# Patient Record
Sex: FEMALE | Race: WHITE | NOT HISPANIC OR LATINO | Employment: FULL TIME | URBAN - METROPOLITAN AREA
[De-identification: names, ages, dates, MRNs, and addresses within clinical notes are randomized per-mention and may not be internally consistent; named-entity substitution may affect disease eponyms.]

---

## 2017-03-02 RX ORDER — LEVOTHYROXINE SODIUM 88 UG/1
TABLET ORAL
Qty: 30 TAB | Refills: 0 | Status: SHIPPED | OUTPATIENT
Start: 2017-03-02 | End: 2017-04-04 | Stop reason: SDUPTHER

## 2017-03-03 DIAGNOSIS — E89.0 HYPOTHYROIDISM, POSTSURGICAL: ICD-10-CM

## 2017-03-21 ENCOUNTER — HOSPITAL ENCOUNTER (OUTPATIENT)
Dept: LAB | Facility: MEDICAL CENTER | Age: 37
End: 2017-03-21
Attending: INTERNAL MEDICINE
Payer: COMMERCIAL

## 2017-03-21 DIAGNOSIS — E89.0 HYPOTHYROIDISM, POSTSURGICAL: ICD-10-CM

## 2017-03-21 LAB
T4 FREE SERPL-MCNC: 0.9 NG/DL (ref 0.53–1.43)
TSH SERPL DL<=0.005 MIU/L-ACNC: 1.84 UIU/ML (ref 0.3–3.7)

## 2017-03-21 PROCEDURE — 84439 ASSAY OF FREE THYROXINE: CPT

## 2017-03-21 PROCEDURE — 84443 ASSAY THYROID STIM HORMONE: CPT

## 2017-03-21 PROCEDURE — 36415 COLL VENOUS BLD VENIPUNCTURE: CPT

## 2017-03-27 ENCOUNTER — TELEPHONE (OUTPATIENT)
Dept: ENDOCRINOLOGY | Facility: MEDICAL CENTER | Age: 37
End: 2017-03-27

## 2017-03-27 NOTE — TELEPHONE ENCOUNTER
Called Pt and notified    The thyroid levels look good. Is there a new PCP who will be managing the thyroid prescription ?    Pt don't have PCP, he still looking for one.    Thank you  Dian

## 2017-04-05 RX ORDER — LEVOTHYROXINE SODIUM 88 UG/1
TABLET ORAL
Qty: 30 TAB | Refills: 0 | Status: SHIPPED | OUTPATIENT
Start: 2017-04-05 | End: 2017-05-08 | Stop reason: SDUPTHER

## 2017-05-08 RX ORDER — LEVOTHYROXINE SODIUM 88 UG/1
88 TABLET ORAL
Qty: 60 TAB | Refills: 0 | Status: SHIPPED | OUTPATIENT
Start: 2017-05-08 | End: 2017-07-10 | Stop reason: SDUPTHER

## 2017-07-10 RX ORDER — LEVOTHYROXINE SODIUM 88 UG/1
88 TABLET ORAL
Qty: 60 TAB | Refills: 0 | Status: SHIPPED | OUTPATIENT
Start: 2017-07-10 | End: 2017-12-01

## 2017-07-10 NOTE — TELEPHONE ENCOUNTER
Pt was told she needed to be seen before getting a refill. She made appt for mid July, can we give her a Rx to hold her over until then? Thank you

## 2017-07-19 ENCOUNTER — HOSPITAL ENCOUNTER (OUTPATIENT)
Dept: LAB | Facility: MEDICAL CENTER | Age: 37
End: 2017-07-19
Attending: OBSTETRICS & GYNECOLOGY
Payer: COMMERCIAL

## 2017-07-19 LAB
DHEA-S SERPL-MCNC: 252.3 UG/DL (ref 60.9–337)
ESTRADIOL SERPL-MCNC: 37 PG/ML
FSH SERPL-ACNC: 9 MIU/ML
LH SERPL-ACNC: 7 IU/L

## 2017-07-19 PROCEDURE — 36415 COLL VENOUS BLD VENIPUNCTURE: CPT

## 2017-07-19 PROCEDURE — 84270 ASSAY OF SEX HORMONE GLOBUL: CPT

## 2017-07-19 PROCEDURE — 82627 DEHYDROEPIANDROSTERONE: CPT

## 2017-07-19 PROCEDURE — 82670 ASSAY OF TOTAL ESTRADIOL: CPT

## 2017-07-19 PROCEDURE — 84403 ASSAY OF TOTAL TESTOSTERONE: CPT

## 2017-07-19 PROCEDURE — 83002 ASSAY OF GONADOTROPIN (LH): CPT

## 2017-07-19 PROCEDURE — 83001 ASSAY OF GONADOTROPIN (FSH): CPT

## 2017-07-22 LAB
SHBG SERPL-SCNC: 55 NMOL/L (ref 30–135)
TESTOST FREE SERPL-MCNC: 1.9 PG/ML (ref 1.3–9.2)
TESTOST SERPL-MCNC: 16 NG/DL (ref 9–55)

## 2017-07-25 ENCOUNTER — OFFICE VISIT (OUTPATIENT)
Dept: MEDICAL GROUP | Facility: MEDICAL CENTER | Age: 37
End: 2017-07-25
Payer: COMMERCIAL

## 2017-07-25 VITALS
SYSTOLIC BLOOD PRESSURE: 104 MMHG | HEIGHT: 68 IN | BODY MASS INDEX: 20.31 KG/M2 | OXYGEN SATURATION: 99 % | WEIGHT: 134 LBS | DIASTOLIC BLOOD PRESSURE: 76 MMHG | TEMPERATURE: 98.6 F | HEART RATE: 70 BPM

## 2017-07-25 DIAGNOSIS — E89.0 HYPOTHYROIDISM, POSTSURGICAL: ICD-10-CM

## 2017-07-25 DIAGNOSIS — M54.41 ACUTE BILATERAL LOW BACK PAIN WITH RIGHT-SIDED SCIATICA: ICD-10-CM

## 2017-07-25 DIAGNOSIS — E53.8 B12 DEFICIENCY: ICD-10-CM

## 2017-07-25 DIAGNOSIS — N30.10 INTERSTITIAL CYSTITIS: ICD-10-CM

## 2017-07-25 DIAGNOSIS — E56.9 VITAMIN DEFICIENCY: ICD-10-CM

## 2017-07-25 DIAGNOSIS — Z31.9 PATIENT DESIRES PREGNANCY: ICD-10-CM

## 2017-07-25 PROBLEM — M54.40 ACUTE BILATERAL LOW BACK PAIN WITH SCIATICA: Status: ACTIVE | Noted: 2017-07-25

## 2017-07-25 PROCEDURE — 99203 OFFICE O/P NEW LOW 30 MIN: CPT | Performed by: NURSE PRACTITIONER

## 2017-07-25 ASSESSMENT — PATIENT HEALTH QUESTIONNAIRE - PHQ9: CLINICAL INTERPRETATION OF PHQ2 SCORE: 0

## 2017-07-25 NOTE — ASSESSMENT & PLAN NOTE
Low back strain during pilates class about 1 month ago  Now with persistent Bilateral low back with radiation down the posterior right leg, worse when sitting for long periods of time. She's had to modify her exercise routine Using advil on occ with some benefit  No lower extremity numbness, tingling, weakness, no saddle anesthesia

## 2017-07-25 NOTE — ASSESSMENT & PLAN NOTE
Working on achieving pregnancy  Having irregular menses last few months  Recent hormones normal  Has had difficulty with prenatal flaring interstitial cystitis, so now on a few separate vitamins including folic acid

## 2017-07-25 NOTE — PROGRESS NOTES
Chief Complaint   Patient presents with   • Establish Care   • Back Pain     x4 wks      Lisa Arevalo is a 36 y.o. female here to establish care, she had previously seen Liliana CANTU. She works for the [place] [employer], is  and lives in [place]. We discussed:  Hypothyroidism, postsurgical  2010  Hyperthyroid  Now managed with levoxyll 88 mcg  Energy and weight stable  Patient desires pregnancy  Working on achieving pregnancy  Having irregular menses last few months  Recent hormones normal  Has had difficulty with prenatal flaring interstitial cystitis, so now on a few separate vitamins including folic acid  Interstitial Cystitis  Previously seen by urology  Uses uribel on occ  B12 deficiency  On supplementation  Vegetarian diet  No numbness, tingling  Acute bilateral low back pain with sciatica  Low back strain during pilates class about 1 month ago  Bilateral low back with radiation down the right leg  Using advil on occ with some benefit  Vitamin d deficiency  On vitamin D supplementation, last value 41    Current medicines (including changes today)  Current Outpatient Prescriptions   Medication Sig Dispense Refill   • Meth-Hyo-M Bl-Na Phos-Ph Sal (URIBEL PO) Take  by mouth.     • LEVOXYL 88 MCG Tab Take 1 Tab by mouth Every morning on an empty stomach. 60 Tab 0   • clindamycin (CLEOCIN) 1 % Solution Apply  to affected area(s) as needed.     • tretinoin (RETIN-A) 0.1 % cream Apply  to affected area(s) every evening.       No current facility-administered medications for this visit.     She  has a past medical history of Migraine; Interstitial cystitis; Hyperthyroidism (2010); Arrhythmia; Heart burn; Cold; Indigestion; Renal disorder; Hypothyroidism, postsurgical (2010); and Fibromyalgia.  She  has past surgical history that includes mastoidectomy; myringotomy; tonsillectomy and adenoidectomy (1985); other orthopedic surgery; other; and thyroidectomy total (9/29/2010).  Social History  "  Substance Use Topics   • Smoking status: Never Smoker    • Smokeless tobacco: Never Used   • Alcohol Use: Yes      Comment: rare     Social History     Social History Narrative     Family History   Problem Relation Age of Onset   • Hypertension Father    • Hypertension Brother    • Alcohol/Drug Brother    • Cancer Maternal Grandmother      breast, colon     Family Status   Relation Status Death Age   • Father Alive    • Brother     • Mother Alive          ROS  Problems listed discussed above, all other systems reviewed and negative     Objective:     Blood pressure 104/76, pulse 70, temperature 37 °C (98.6 °F), height 1.727 m (5' 8\"), weight 60.782 kg (134 lb), SpO2 99 %. Body mass index is 20.38 kg/(m^2).  Physical Exam:  General: Alert, oriented in no acute distress.  Eye contact is good, speech is normal, affect calm  HEENT:  perrl, Oral mucosa pink moist, no lesions. Nares patent. TMs gray with good landmarks bilaterally. No cervical or supraclavicular lymphadenopathy  Lungs: clear to auscultation bilaterally, good aeration, normal effort. No wheeze/ rhonchi/ rales.  CV: regular rate and rhythm, S1, S2. No murmur, no edema. Pedal pulses 2 + bilaterally  Abdomen: soft, nontender, BS x4  Ext: color normal, vascularity normal, temperature normal. No rash or lesions.  MS: no point tenderness over spine, no obvious deformity. Negative straight leg raise bilaterally. Normal gait, normal flexion at the waist, Strength is 5/5 in bilateral LE   Assessment and Plan:   The following treatment plan was discussed   1. Hypothyroidism, postsurgical  Clinically euthyroid, recheck labs  COMP METABOLIC PANEL    TSH    FREE THYROXINE   2. Patient desires pregnancy  Working on conceiving, followed by ob/gyn   3. Interstitial cystitis  occ using uribel with good results   4. B12 deficiency  Has been stable on supplementation  CBC WITH DIFFERENTIAL    VIT B12,  FOLIC ACID   5. Acute bilateral low back pain with right-sided " sciatica  Conservative treatment reviewed, printed exercises given. May continue with advil as needed, ice/ heat application. If not improving in 1-2 weeks we'll plan for referral to physical therapy and possibly imaging, she will let me know    6. Vitamin deficiency   on supplementation  VITAMIN D,25 HYDROXY       Educated in proper administration of medication(s) ordered today including safety, possible SE, risks, benefits, rationale and alternatives to therapy.     Followup: pending labs             Please note that this dictation was created using voice recognition software. I have worked with consultants from the vendor as well as technical experts from [employer] to optimize the interface. I have made every reasonable attempt to correct obvious errors, but I expect that there are errors of grammar and possibly content that I did not discover before finalizing the note.

## 2017-07-25 NOTE — MR AVS SNAPSHOT
"        Lisa Arevalo   2017 2:40 PM   Office Visit   MRN: [id number]    Department:  [facility]   Dept Phone:  [phone]    Description:  Female : 1980   Provider:  MASOOD Moody           Reason for Visit     Establish Care     Back Pain x4 wks       Allergies as of 2017     Allergen Noted Reactions    Macrobid [Nitrofurantoin Monohydrate Macrocrystals] 2009       Chest problems    Minocycline 2009   Hives    Neurontin [Gabapentin] 2009       Slurry speechj    Rocephin [Ceftriaxone Sodium] 2009   Hives    Tapazole [Thiamazole] 2009   Hives    Propylthiouracil 2010         You were diagnosed with     Hypothyroidism, postsurgical   [544110]       Patient desires pregnancy   [832815]       Interstitial cystitis   [839891]       B12 deficiency   [966173]       Acute bilateral low back pain with right-sided sciatica   [0607181]       Vitamin deficiency   [531383]         Vital Signs     Blood Pressure Pulse Temperature Height Weight Body Mass Index    104/76 mmHg 70 37 °C (98.6 °F) 1.727 m (5' 8\") 60.782 kg (134 lb) 20.38 kg/m2    Oxygen Saturation Smoking Status                99% Never Smoker           Basic Information     Date Of Birth Sex Race Ethnicity Preferred Language    1980 Female White Non- English      Problem List              ICD-10-CM Priority Class Noted - Resolved    Migraine    Unknown - Present    Interstitial cystitis N30.10   Unknown - Present    B12 deficiency E53.8   2011 - Present    S/P total thyroidectomy for Graves Disease,  E89.0   2012 - Present    Vitamin d deficiency    2013 - Present    Fibromyalgia M79.7   Unknown - Present    Hypothyroidism, postsurgical E89.0   2016 - Present    Patient desires pregnancy Z31.9   2017 - Present    Acute bilateral low back pain with sciatica M54.40   2017 - Present      Health Maintenance        Date Due Completion Dates   " IMM DTaP/Tdap/Td Vaccine (1 - Tdap) 9/20/2001 ---    PAP SMEAR 9/20/2003 ---    IMM INFLUENZA (1) 9/1/2017 ---            Current Immunizations     HPV Quadrivalent Vaccine (GARDASIL) 1/1/2008, 1/1/2008, 1/1/2008    Hepatitis B Vaccine Non-Recombivax (Ped/Adol) 1/1/1997    Tetanus Vaccine 1/1/2005      Below and/or attached are the medications your provider expects you to take. Review all of your home medications and newly ordered medications with your provider and/or pharmacist. Follow medication instructions as directed by your provider and/or pharmacist. Please keep your medication list with you and share with your provider. Update the information when medications are discontinued, doses are changed, or new medications (including over-the-counter products) are added; and carry medication information at all times in the event of emergency situations     Allergies:  MACROBID - (reactions not documented)     MINOCYCLINE - Hives     NEURONTIN - (reactions not documented)     ROCEPHIN - Hives     TAPAZOLE - Hives     PROPYLTHIOURACIL - (reactions not documented)               Medications  Valid as of: July 25, 2017 -  3:31 PM    Generic Name Brand Name Tablet Size Instructions for use    Clindamycin Phosphate (Solution) CLEOCIN 1 % Apply  to affected area(s) as needed.        Levothyroxine Sodium (Tab) LEVOXYL 88 MCG Take 1 Tab by mouth Every morning on an empty stomach.        Meth-Hyo-M Bl-Na Phos-Ph Sal   Take  by mouth.        Tretinoin (Cream) RETIN-A 0.1 % Apply  to affected area(s) every evening.        .                 Medicines prescribed today were sent to:     [id number] - [place] - [address]    [email or web address] [place] [place] [address]    Phone: [phone] Fax: [phone]    Open 24 Hours?: No      Medication refill instructions:       If your prescription bottle indicates you have medication refills left, it is not necessary to call your provider’s office. Please contact your pharmacy and they  will refill your medication.    If your prescription bottle indicates you do not have any refills left, you may request refills at any time through one of the following ways: The online [employer] system (except Urgent Care), by calling your provider’s office, or by asking your pharmacy to contact your provider’s office with a refill request. Medication refills are processed only during regular business hours and may not be available until the next business day. Your provider may request additional information or to have a follow-up visit with you prior to refilling your medication.   *Please Note: Medication refills are assigned a new Rx number when refilled electronically. Your pharmacy may indicate that no refills were authorized even though a new prescription for the same medication is available at the pharmacy. Please request the medicine by name with the pharmacy before contacting your provider for a refill.        Your To Do List     Future Labs/Procedures Complete By Expires    CBC WITH DIFFERENTIAL  As directed 7/26/2018    COMP METABOLIC PANEL  As directed 7/26/2018    FREE THYROXINE  As directed 7/26/2018    TSH  As directed 7/26/2018    VITAMIN D,25 HYDROXY  As directed 7/26/2018         [employer] Access Code: Activation code not generated  Current [employer] Status: Active

## 2017-08-19 ENCOUNTER — HOSPITAL ENCOUNTER (OUTPATIENT)
Dept: LAB | Facility: MEDICAL CENTER | Age: 37
End: 2017-08-19
Attending: NURSE PRACTITIONER
Payer: COMMERCIAL

## 2017-08-19 DIAGNOSIS — E56.9 VITAMIN DEFICIENCY: ICD-10-CM

## 2017-08-19 DIAGNOSIS — E89.0 HYPOTHYROIDISM, POSTSURGICAL: ICD-10-CM

## 2017-08-19 DIAGNOSIS — E53.8 B12 DEFICIENCY: ICD-10-CM

## 2017-08-19 LAB
25(OH)D3 SERPL-MCNC: 28 NG/ML (ref 30–100)
ALBUMIN SERPL BCP-MCNC: 4.3 G/DL (ref 3.2–4.9)
ALBUMIN/GLOB SERPL: 1.7 G/DL
ALP SERPL-CCNC: 42 U/L (ref 30–99)
ALT SERPL-CCNC: 12 U/L (ref 2–50)
ANION GAP SERPL CALC-SCNC: 4 MMOL/L (ref 0–11.9)
AST SERPL-CCNC: 15 U/L (ref 12–45)
BASOPHILS # BLD AUTO: 1.2 % (ref 0–1.8)
BASOPHILS # BLD: 0.05 K/UL (ref 0–0.12)
BILIRUB SERPL-MCNC: 3 MG/DL (ref 0.1–1.5)
BUN SERPL-MCNC: 11 MG/DL (ref 8–22)
CALCIUM SERPL-MCNC: 9.6 MG/DL (ref 8.5–10.5)
CHLORIDE SERPL-SCNC: 109 MMOL/L (ref 96–112)
CO2 SERPL-SCNC: 26 MMOL/L (ref 20–33)
CREAT SERPL-MCNC: 0.83 MG/DL (ref 0.5–1.4)
EOSINOPHIL # BLD AUTO: 0.13 K/UL (ref 0–0.51)
EOSINOPHIL NFR BLD: 3.1 % (ref 0–6.9)
ERYTHROCYTE [DISTWIDTH] IN BLOOD BY AUTOMATED COUNT: 40.5 FL (ref 35.9–50)
FOLATE SERPL-MCNC: >23.7 NG/ML
GFR SERPL CREATININE-BSD FRML MDRD: >60 ML/MIN/1.73 M 2
GLOBULIN SER CALC-MCNC: 2.5 G/DL (ref 1.9–3.5)
GLUCOSE SERPL-MCNC: 81 MG/DL (ref 65–99)
HCT VFR BLD AUTO: 47.7 % (ref 37–47)
HGB BLD-MCNC: 16.2 G/DL (ref 12–16)
IMM GRANULOCYTES # BLD AUTO: 0.01 K/UL (ref 0–0.11)
IMM GRANULOCYTES NFR BLD AUTO: 0.2 % (ref 0–0.9)
LYMPHOCYTES # BLD AUTO: 1.87 K/UL (ref 1–4.8)
LYMPHOCYTES NFR BLD: 44.7 % (ref 22–41)
MCH RBC QN AUTO: 32.5 PG (ref 27–33)
MCHC RBC AUTO-ENTMCNC: 34 G/DL (ref 33.6–35)
MCV RBC AUTO: 95.8 FL (ref 81.4–97.8)
MONOCYTES # BLD AUTO: 0.26 K/UL (ref 0–0.85)
MONOCYTES NFR BLD AUTO: 6.2 % (ref 0–13.4)
NEUTROPHILS # BLD AUTO: 1.86 K/UL (ref 2–7.15)
NEUTROPHILS NFR BLD: 44.6 % (ref 44–72)
NRBC # BLD AUTO: 0 K/UL
NRBC BLD AUTO-RTO: 0 /100 WBC
PLATELET # BLD AUTO: 187 K/UL (ref 164–446)
PMV BLD AUTO: 11.2 FL (ref 9–12.9)
POTASSIUM SERPL-SCNC: 4.3 MMOL/L (ref 3.6–5.5)
PROT SERPL-MCNC: 6.8 G/DL (ref 6–8.2)
RBC # BLD AUTO: 4.98 M/UL (ref 4.2–5.4)
SODIUM SERPL-SCNC: 139 MMOL/L (ref 135–145)
T4 FREE SERPL-MCNC: 1.05 NG/DL (ref 0.53–1.43)
TSH SERPL DL<=0.005 MIU/L-ACNC: 6.01 UIU/ML (ref 0.3–3.7)
VIT B12 SERPL-MCNC: 631 PG/ML (ref 211–911)
WBC # BLD AUTO: 4.2 K/UL (ref 4.8–10.8)

## 2017-08-19 PROCEDURE — 82607 VITAMIN B-12: CPT

## 2017-08-19 PROCEDURE — 84443 ASSAY THYROID STIM HORMONE: CPT

## 2017-08-19 PROCEDURE — 82306 VITAMIN D 25 HYDROXY: CPT

## 2017-08-19 PROCEDURE — 85025 COMPLETE CBC W/AUTO DIFF WBC: CPT

## 2017-08-19 PROCEDURE — 80053 COMPREHEN METABOLIC PANEL: CPT

## 2017-08-19 PROCEDURE — 36415 COLL VENOUS BLD VENIPUNCTURE: CPT

## 2017-08-19 PROCEDURE — 82746 ASSAY OF FOLIC ACID SERUM: CPT

## 2017-08-19 PROCEDURE — 84439 ASSAY OF FREE THYROXINE: CPT

## 2017-08-21 DIAGNOSIS — E89.0 HYPOTHYROIDISM, POSTSURGICAL: ICD-10-CM

## 2017-08-22 ENCOUNTER — TELEPHONE (OUTPATIENT)
Dept: MEDICAL GROUP | Facility: MEDICAL CENTER | Age: 37
End: 2017-08-22

## 2017-08-22 RX ORDER — LEVOTHYROXINE SODIUM 100 UG/1
100 TABLET ORAL
Qty: 30 TAB | Refills: 3 | Status: SHIPPED | OUTPATIENT
Start: 2017-08-22 | End: 2017-12-26 | Stop reason: SDUPTHER

## 2017-08-22 NOTE — TELEPHONE ENCOUNTER
1. Caller Name: Pt                      Call Back Number: [phone]    2. Message: Pt called stating she is having symptoms of fatigue. She thinks this is due to the thyroid being off and would like to adjust dosage now. Please advise.     3. Patient approves office to leave a detailed voicemail/MyChart message: yes

## 2017-09-25 ENCOUNTER — TELEPHONE (OUTPATIENT)
Dept: MEDICAL GROUP | Facility: MEDICAL CENTER | Age: 37
End: 2017-09-25

## 2017-09-25 DIAGNOSIS — G89.29 CHRONIC LOW BACK PAIN, UNSPECIFIED BACK PAIN LATERALITY, WITH SCIATICA PRESENCE UNSPECIFIED: ICD-10-CM

## 2017-09-25 DIAGNOSIS — M54.5 CHRONIC LOW BACK PAIN, UNSPECIFIED BACK PAIN LATERALITY, WITH SCIATICA PRESENCE UNSPECIFIED: ICD-10-CM

## 2017-09-25 NOTE — TELEPHONE ENCOUNTER
1. Caller Name: Lisa Arevalo                        Call Back Number: [phone] (home)       2. Message: patient called would like to know if a referral to phyisical therapy can be done. She had asked sabrina to place referral however I dont see referral placed for patient.    3. Patient approves office to leave a detailed voicemail/MyChart message: yes

## 2017-10-03 ENCOUNTER — HOSPITAL ENCOUNTER (OUTPATIENT)
Dept: PHYSICAL THERAPY | Facility: REHABILITATION | Age: 37
End: 2017-10-03
Attending: FAMILY MEDICINE
Payer: COMMERCIAL

## 2017-10-03 PROCEDURE — 97012 MECHANICAL TRACTION THERAPY: CPT

## 2017-10-03 PROCEDURE — 97162 PT EVAL MOD COMPLEX 30 MIN: CPT

## 2017-10-05 ENCOUNTER — HOSPITAL ENCOUNTER (OUTPATIENT)
Dept: PHYSICAL THERAPY | Facility: REHABILITATION | Age: 37
End: 2017-10-05
Attending: FAMILY MEDICINE
Payer: COMMERCIAL

## 2017-10-05 PROCEDURE — 97110 THERAPEUTIC EXERCISES: CPT

## 2017-10-05 PROCEDURE — 97012 MECHANICAL TRACTION THERAPY: CPT

## 2017-10-05 PROCEDURE — 97140 MANUAL THERAPY 1/> REGIONS: CPT

## 2017-10-10 ENCOUNTER — APPOINTMENT (OUTPATIENT)
Dept: OTHER | Facility: IMAGING CENTER | Age: 37
End: 2017-10-10

## 2017-10-10 ENCOUNTER — APPOINTMENT (OUTPATIENT)
Dept: PHYSICAL THERAPY | Facility: REHABILITATION | Age: 37
End: 2017-10-10
Attending: FAMILY MEDICINE
Payer: COMMERCIAL

## 2017-10-10 PROCEDURE — 97530 THERAPEUTIC ACTIVITIES: CPT | Performed by: FAMILY MEDICINE

## 2017-10-12 ENCOUNTER — PHYSICAL THERAPY (OUTPATIENT)
Dept: PHYSICAL THERAPY | Facility: REHABILITATION | Age: 37
End: 2017-10-12
Attending: FAMILY MEDICINE
Payer: COMMERCIAL

## 2017-10-12 DIAGNOSIS — M54.50 BILATERAL LOW BACK PAIN WITHOUT SCIATICA, UNSPECIFIED CHRONICITY: ICD-10-CM

## 2017-10-12 PROCEDURE — 97014 ELECTRIC STIMULATION THERAPY: CPT

## 2017-10-12 PROCEDURE — 97140 MANUAL THERAPY 1/> REGIONS: CPT

## 2017-10-12 PROCEDURE — 97110 THERAPEUTIC EXERCISES: CPT

## 2017-10-12 ASSESSMENT — ENCOUNTER SYMPTOMS
EXACERBATED BY: BENDING
EXACERBATED BY: PROLONGED SITTING
EXACERBATED BY: HOUSEWORK
ALLEVIATING FACTORS: WALKING

## 2017-10-12 NOTE — OP THERAPY DAILY TREATMENT
Outpatient Physical Therapy  DAILY TREATMENT     [facility] Outpatient Physical Therapy [place] [address], [address]  [place] [address]    Date: 10/12/2017    Patient: Lisa Arevalo  YOB: 1980  MRN: [id number]     Time Calculation  Start time: 0930  Stop time: 1015 Time Calculation (min): 45 minutes     Chief Complaint: Back Injury and Back Problem    Visit #: 1    Subjective:   History of Present Illness:     Mechanism of injury:  Patient re-injured lumbar spine leaning over to pick grapes in garden this weekend  Pain:     Relieving factors:  Walking    Aggravating factors:  Prolonged sitting, housework and bending      Objective      Therapeutic Exercises:     1. Repeated extension prone over pillow , 4 x 10, painfree rom only    2. Quadraped rocking with neutral spine, 1 x 15    3. Superman on ball , 3 x 1 minute    4. Ball wall squats, 1 x 15, painfree only    Treatments:    1. Manual therapy, 10 min, lumbar CPA L4-S1, overpressure with active repeated extension    2. Electrical stimulation (interferential current), 15 minutes, IFC with MHP Lumbosacral multifidi/PS, prone in extension      Assessment, Response and Plan:   Impairments: difficulty performing job    Assessment details:  Pain with bending, lumbar spine flexion    Plan:   Therapy options:  Physical therapy treatment to continue  Frequency:  1x week  Plan details:  Extension biased Lumbar/abdominal stabilization, repeated extension

## 2017-10-13 ENCOUNTER — TELEPHONE (OUTPATIENT)
Dept: MEDICAL GROUP | Facility: MEDICAL CENTER | Age: 37
End: 2017-10-13

## 2017-10-13 NOTE — TELEPHONE ENCOUNTER
1. Caller Name: Lisa Arevalo                                         Call Back Number: [phone]      Patient approves a detailed voicemail message: yes    Pt called and reports she has been doing PT which was helping. However Pt had an apt yesterday and is now experiencing pins and needles sensations down her back and legs as well as the feeling of having an ice pack on her back without actually having one on her. Pt is very concerned and would like to know if she should have a MRI to check on things or what you suggest her to do. Pt did call and leave a message with PT as well.

## 2017-10-13 NOTE — TELEPHONE ENCOUNTER
Please inform patient:  Red flags that would require ER visit and MRI include weakness in lower extremities, numbness in the genitals, loss of bowel or bladder function  She can take Advil, this may spontaneously resolve. If it becomes persistent we will arrange for outpatient MRI. Urgent care or ER advised for any further concerns

## 2017-10-16 ENCOUNTER — TELEPHONE (OUTPATIENT)
Dept: PHYSICAL THERAPY | Facility: REHABILITATION | Age: 37
End: 2017-10-16

## 2017-10-16 NOTE — TELEPHONE ENCOUNTER
Returned phone call to patient regarding flare up in lumbar symptoms.  Symptoms appear to be peripheralizing down right LE.  Patient instructed to perform extension exercises and use moist heat/ cold pack to decrease symptoms.

## 2017-10-18 ENCOUNTER — TELEPHONE (OUTPATIENT)
Dept: MEDICAL GROUP | Facility: MEDICAL CENTER | Age: 37
End: 2017-10-18

## 2017-10-18 NOTE — TELEPHONE ENCOUNTER
1. Caller Name: Pt                      Call Back Number: [phone]    2. Message: Pt called to report that she is still getting tingles down her back down to her legs. She is starting to get pain on her left lower back and left leg. She also has had the flu since Sunday and a fever. She has a sharp stabbing pain on the left side of her head.     3. Patient approves office to leave a detailed voicemail/MyChart message: yes

## 2017-10-20 ENCOUNTER — PHYSICAL THERAPY (OUTPATIENT)
Dept: PHYSICAL THERAPY | Facility: REHABILITATION | Age: 37
End: 2017-10-20
Attending: FAMILY MEDICINE
Payer: COMMERCIAL

## 2017-10-20 ENCOUNTER — OFFICE VISIT (OUTPATIENT)
Dept: MEDICAL GROUP | Facility: MEDICAL CENTER | Age: 37
End: 2017-10-20
Payer: COMMERCIAL

## 2017-10-20 VITALS
SYSTOLIC BLOOD PRESSURE: 104 MMHG | HEIGHT: 68 IN | TEMPERATURE: 98.9 F | BODY MASS INDEX: 20.16 KG/M2 | OXYGEN SATURATION: 98 % | HEART RATE: 77 BPM | WEIGHT: 133 LBS | DIASTOLIC BLOOD PRESSURE: 60 MMHG

## 2017-10-20 DIAGNOSIS — M54.41 ACUTE BILATERAL LOW BACK PAIN WITH RIGHT-SIDED SCIATICA: ICD-10-CM

## 2017-10-20 DIAGNOSIS — M54.16 LUMBAR BACK PAIN WITH RADICULOPATHY AFFECTING LEFT LOWER EXTREMITY: ICD-10-CM

## 2017-10-20 DIAGNOSIS — M54.42 ACUTE BILATERAL LOW BACK PAIN WITH BILATERAL SCIATICA: ICD-10-CM

## 2017-10-20 DIAGNOSIS — M54.41 ACUTE BILATERAL LOW BACK PAIN WITH BILATERAL SCIATICA: ICD-10-CM

## 2017-10-20 PROCEDURE — 97014 ELECTRIC STIMULATION THERAPY: CPT

## 2017-10-20 PROCEDURE — 97110 THERAPEUTIC EXERCISES: CPT

## 2017-10-20 PROCEDURE — 99214 OFFICE O/P EST MOD 30 MIN: CPT | Performed by: NURSE PRACTITIONER

## 2017-10-20 PROCEDURE — 97140 MANUAL THERAPY 1/> REGIONS: CPT

## 2017-10-20 PROCEDURE — 97530 THERAPEUTIC ACTIVITIES: CPT

## 2017-10-20 RX ORDER — METHYLPREDNISOLONE 4 MG/1
TABLET ORAL
Qty: 21 TAB | Refills: 0 | Status: SHIPPED | OUTPATIENT
Start: 2017-10-20 | End: 2017-12-01

## 2017-10-20 NOTE — PROGRESS NOTES
"Subjective:     Chief Complaint   Patient presents with   • Follow-Up     Back issues and tingling sensation      Lisa Arevalo is a 37 y.o. female here today to follow up on:    Acute bilateral low back pain with sciatica  Ongoing difficulty with low back pain since July of this year, now worsening  She does not recall any particular injury but felt that she had strained initially and Pilates  She recently went to physical therapy and her pain got significantly worse. Now a 6-7 out of 10 with radiation down the left leg. She has persistent numbness and tingling across the low back and down the leg as well. She is having difficulty bending over, the discomfort is waking her up during the night. She's been taking 400 mg of ibuprofen with no improvement  No change in bowel or bladder function, no saddle anesthesia       Current medicines (including changes today)  Current Outpatient Prescriptions   Medication Sig Dispense Refill   • MethylPREDNISolone (MEDROL DOSEPAK) 4 MG Tablet Therapy Pack As directed on the packaging label. 21 Tab 0   • LEVOXYL 100 MCG Tab Take 1 Tab by mouth Every morning on an empty stomach. 30 Tab 3   • Meth-Hyo-M Bl-Na Phos-Ph Sal (URIBEL PO) Take  by mouth.     • LEVOXYL 88 MCG Tab Take 1 Tab by mouth Every morning on an empty stomach. 60 Tab 0   • clindamycin (CLEOCIN) 1 % Solution Apply  to affected area(s) as needed.     • tretinoin (RETIN-A) 0.1 % cream Apply  to affected area(s) every evening.       No current facility-administered medications for this visit.      She  has a past medical history of Arrhythmia; Cold; Fibromyalgia; Heart burn; Hyperthyroidism (2010); Hypothyroidism, postsurgical (2010); Indigestion; Interstitial cystitis; Migraine; and Renal disorder.    ROS included above     Objective:     Blood pressure 104/60, pulse 77, temperature 37.2 °C (98.9 °F), height 1.727 m (5' 8\"), weight 60.3 kg (133 lb), SpO2 98 %. Body mass index is 20.22 kg/m².     Physical " Exam:  General: Alert, oriented in no acute distress.  Eye contact is good, speech is normal, affect calm  Lungs: clear to auscultation bilaterally, normal effort, no wheeze/ rhonchi/ rales.  CV: regular rate and rhythm, S1, S2, no murmur  MS: Mildly tender over the lumbar spine without obvious abnormality, difficulty bending at the waist. Negative straight leg raise bilaterally. DTRs are 2+ in bilateral lower extremities. Antalgic gait  Ext: no edema, color normal, vascularity normal, temperature normal    Assessment and Plan:   The following treatment plan was discussed   1. Lumbar back pain with radiculopathy affecting left lower extremity  Persistent numbness and tingling across the low back and in the left leg. We'll start her on short course of oral corticosteroid, imaging as listed below. Advised to take methylprednisolone with food, do not combine with ibuprofen or other NSAIDs. She's taken this in the past and tolerated it without difficulty. Red flags reviewed including loss of bladder or bowel control, saddle anesthesia, weakness in lower extremities.   MR-LUMBAR SPINE-W/O    MethylPREDNISolone (MEDROL DOSEPAK) 4 MG Tablet Therapy Pack            Followup: One week         Please note that this dictation was created using voice recognition software. I have worked with consultants from the vendor as well as technical experts from [facility] [employer] to optimize the interface. I have made every reasonable attempt to correct obvious errors, but I expect that there are errors of grammar and possibly content that I did not discover before finalizing the note.

## 2017-10-20 NOTE — OP THERAPY DAILY TREATMENT
Outpatient Physical Therapy  DAILY TREATMENT     [facility] Outpatient Physical Therapy [place]  [address], [address]  [place] [address]    Date: 10/20/2017    Patient: Lisa Arevalo  YOB: 1980  MRN: [id number]     Time Calculation  Start time: 0902  Stop time: 0958 Time Calculation (min): 56 minutes     Chief Complaint: Back Pain (with L radicular sxs)    Visit #: 2    Subjective; Patient reports after last treatment increased with cold sensation and L tingling into L leg. Pain 7/10 this am, started feeling better, but shadi driving to appt in the car sxs increased.     Objective      Treatments:    1. Therex, Patient in supine with knee bent and ball on wall, knee curls x 15 and marching x 15 , sxs into L foot with marching , Prone with one pillow improved sxs, decreased tingling     2. Therex, 1/2 foam roller pec stretch and alternating UE 5 minutes,  10 minutes    3. Manual therapy, Thoracic PA grade III/IV tenderness T10-T12 with lateral/rotational component , Muscle energy for ASIS symmetry , 15 minutes    4. Functional training, Discussed standing posture with decreasing bilateral knee hyperextension, squatting in wide stance and office posture set up in chair  , 14 minutes    5. Electrical stimulation (interferential current), Lumbar Spine with moist heat pack in prone 15 minutes      Assessment, Response and Plan:   Assessment details:  Patient tolerated tx well, with sxs decreasing post therapy session. Patient continue to demonstrate poor postural mechanics with decreased trunk stabilization and hypomobile T-spine. Extension exercises were eased up this session to pain free range and see If change with additional symptoms. Patient will continue to benefit from skilled PT to decrease sxs and initiate exercise again.

## 2017-10-20 NOTE — ASSESSMENT & PLAN NOTE
Ongoing difficulty with low back pain since July of this year, now worsening  She does not recall any particular injury but felt that she had strained initially and Pilates  She recently went to physical therapy and her pain got significantly worse. Now a 6-7 out of 10 with radiation down the left leg. She has persistent numbness and tingling across the low back and down the leg as well. She is having difficulty bending over, the discomfort is waking her up during the night. She's been taking 400 mg of ibuprofen with no improvement  No change in bowel or bladder function, no saddle anesthesia

## 2017-10-21 ENCOUNTER — HOSPITAL ENCOUNTER (OUTPATIENT)
Dept: RADIOLOGY | Facility: MEDICAL CENTER | Age: 37
End: 2017-10-21
Attending: NURSE PRACTITIONER
Payer: COMMERCIAL

## 2017-10-21 DIAGNOSIS — M54.16 LUMBAR BACK PAIN WITH RADICULOPATHY AFFECTING LEFT LOWER EXTREMITY: ICD-10-CM

## 2017-10-21 PROCEDURE — 72148 MRI LUMBAR SPINE W/O DYE: CPT

## 2017-10-23 ENCOUNTER — TELEPHONE (OUTPATIENT)
Dept: MEDICAL GROUP | Facility: MEDICAL CENTER | Age: 37
End: 2017-10-23

## 2017-10-23 ENCOUNTER — PHYSICAL THERAPY (OUTPATIENT)
Dept: PHYSICAL THERAPY | Facility: REHABILITATION | Age: 37
End: 2017-10-23
Attending: FAMILY MEDICINE
Payer: COMMERCIAL

## 2017-10-23 DIAGNOSIS — D18.09 HEMANGIOMA OF SPINE: ICD-10-CM

## 2017-10-23 DIAGNOSIS — M54.40 ACUTE BILATERAL LOW BACK PAIN WITH SCIATICA, SCIATICA LATERALITY UNSPECIFIED: ICD-10-CM

## 2017-10-23 PROCEDURE — 97110 THERAPEUTIC EXERCISES: CPT

## 2017-10-23 PROCEDURE — 97140 MANUAL THERAPY 1/> REGIONS: CPT

## 2017-10-23 ASSESSMENT — ENCOUNTER SYMPTOMS
QUALITY: NUMBNESS
PAIN TIMING: INTERMITTENT
PAIN SCALE: 5
ALLEVIATING FACTORS: ACTIVITY MODIFICATION
ALLEVIATING FACTORS: LYING DOWN
PAIN SCALE AT HIGHEST: 8
EXACERBATED BY: KNEELING
EXACERBATED BY: PROLONGED SITTING
QUALITY: NEEDLE-LIKE
PAIN TIMING: EVERY DAY
EXACERBATED BY: BENDING
PAIN SCALE AT LOWEST: 0

## 2017-10-23 NOTE — OP THERAPY DAILY TREATMENT
Outpatient Physical Therapy  DAILY TREATMENT     [facility] Outpatient Physical Therapy [place] [id number], [address]  [place] [address]    Date: 10/23/2017    Patient: Lisa Arevalo  YOB: 1980  MRN: [id number]     Time Calculation  Start time: 1100  Stop time: 1135 Time Calculation (min): 35 minutes     Chief Complaint: Spinal Injury    Visit #: Visit count could not be calculated. Make sure you are using a visit which is associated with an episode.    Subjective:   History of Present Illness:     Mechanism of injury:  Increased bilateral LE paraesthesia including intermittent saddle anesthesia and paresthesia since last visit.  MRI shows left sacral hemangioma and minimal disc bulge L4/5.     Pain:     Current pain ratin    At best pain ratin    At worst pain ratin    Location:  Bilateral N/T lumbar to lateral hip down to bottoms of feet, sacrl    Quality:  Numbness and needle-like    Pain timing:  Every day and intermittent    Relieving factors:  Lying down and activity modification    Aggravating factors:  Bending, kneeling and prolonged sitting    Pain Comments::  Symptoms increase with hot/cold over sacrum which causes numbness from sacrum to BLE and plantar surface of feet.  Increased symptoms  pressure with palpation over sacral base .        Objective      Therapeutic Exercises:     1. Superman on ball , 3 x 30 sec    2. Ta stabilization with cuff biofeedback  , BKFO, ball curls, marching 1 x 30 each    3. Wall ball squats, 1 x 30    4. Bird dog , 1 x 15 each direction    5. Ther ex : 20 min    Treatments:    1. Manual therapy, 10 min, gentle GR 2-3 PA lumbar , sacral rocking and sacral float, flexion rotation mob  left      Assessment, Response and Plan:   Impairments: activity intolerance and pain with function    Assessment details:  Increased symptom irritability and new symptoms of intermittent saddle anesthesia are concerning.  Symptoms appear to be non mechanical and  possibly from recently dx sacral hemangioma  Recommend referral to spine specialist.    Plan:   Therapy options:  Physical therapy treatment to continue  Plan details:  Next follow-up in 2 weeks.  Re assess need to continue PT after patient sees spine specialist.

## 2017-10-23 NOTE — TELEPHONE ENCOUNTER
1. Caller Name: Pt                      Call Back Number: [phone] (home)       2. Message: Pt called requesting results of MRI.     3. Patient approves office to leave a detailed voicemail/MyChart message: N\A

## 2017-11-09 ENCOUNTER — APPOINTMENT (OUTPATIENT)
Dept: PHYSICAL THERAPY | Facility: REHABILITATION | Age: 37
End: 2017-11-09
Attending: FAMILY MEDICINE
Payer: COMMERCIAL

## 2017-11-13 ENCOUNTER — HOSPITAL ENCOUNTER (OUTPATIENT)
Dept: RADIOLOGY | Facility: MEDICAL CENTER | Age: 37
End: 2017-11-13
Attending: NURSE PRACTITIONER
Payer: COMMERCIAL

## 2017-11-13 DIAGNOSIS — M54.16 LUMBAR RADICULOPATHY: ICD-10-CM

## 2017-11-13 PROCEDURE — 72197 MRI PELVIS W/O & W/DYE: CPT

## 2017-11-13 PROCEDURE — 700117 HCHG RX CONTRAST REV CODE 255: Performed by: NURSE PRACTITIONER

## 2017-11-13 PROCEDURE — A9579 GAD-BASE MR CONTRAST NOS,1ML: HCPCS | Performed by: NURSE PRACTITIONER

## 2017-11-13 PROCEDURE — 72158 MRI LUMBAR SPINE W/O & W/DYE: CPT

## 2017-11-13 RX ADMIN — GADODIAMIDE 13 ML: 287 INJECTION INTRAVENOUS at 09:21

## 2017-11-21 ENCOUNTER — HOSPITAL ENCOUNTER (OUTPATIENT)
Dept: RADIOLOGY | Facility: MEDICAL CENTER | Age: 37
End: 2017-11-21
Attending: NURSE PRACTITIONER
Payer: COMMERCIAL

## 2017-11-21 DIAGNOSIS — R29.2 ABNORMAL REFLEX: ICD-10-CM

## 2017-11-21 PROCEDURE — 72146 MRI CHEST SPINE W/O DYE: CPT

## 2017-11-21 PROCEDURE — 72141 MRI NECK SPINE W/O DYE: CPT

## 2017-12-01 ENCOUNTER — OFFICE VISIT (OUTPATIENT)
Dept: NEUROLOGY | Facility: MEDICAL CENTER | Age: 37
End: 2017-12-01
Payer: COMMERCIAL

## 2017-12-01 VITALS
TEMPERATURE: 98.1 F | WEIGHT: 133.5 LBS | OXYGEN SATURATION: 99 % | RESPIRATION RATE: 16 BRPM | DIASTOLIC BLOOD PRESSURE: 60 MMHG | HEART RATE: 67 BPM | HEIGHT: 68 IN | SYSTOLIC BLOOD PRESSURE: 102 MMHG | BODY MASS INDEX: 20.23 KG/M2

## 2017-12-01 DIAGNOSIS — R20.2 PARESTHESIAS: Primary | ICD-10-CM

## 2017-12-01 PROCEDURE — 99244 OFF/OP CNSLTJ NEW/EST MOD 40: CPT | Performed by: PSYCHIATRY & NEUROLOGY

## 2017-12-01 ASSESSMENT — ENCOUNTER SYMPTOMS
MEMORY LOSS: 0
TINGLING: 1
DEPRESSION: 0
MYALGIAS: 1
SENSORY CHANGE: 1
NECK PAIN: 0
TREMORS: 0
HEADACHES: 0
BACK PAIN: 1
CONSTIPATION: 0
FOCAL WEAKNESS: 1
FALLS: 0

## 2017-12-01 NOTE — PROGRESS NOTES
Subjective:      Lisa Arevalo is a 37 y.o. female who presents for consultation from the office of Dr. Conklin, with a history of persistent and progressive bilateral lower extremity painful dysesthesias and paresthesias, more recently now involving the upper extremities.     HPI    Ms. Arevalo is a pleasant 37-year-old right-handed  female whose symptoms started in July of this year after working out intensely. She developed low back pain across the lumbar region which would radiate into the buttocks only. Activity seemed to make things worse, there was no weakness or distal radiation of symptoms. She took some ibuprofen which did seem to help the symptoms, things did not really seem to progress far.    As part of her treatment, she was undergoing physical therapy and in mid October with an aggressive session, there was sudden nerve pain that again began in the lumbar spine which then radiated further into the buttock and posteriorly down the back of both legs. Over time distal radiation spread even into the feet. The symptoms became constant, exacerbated with movements, at other times randomly. She now finds that sitting for long periods, even bending over can elicit these symptoms. There is no change with Valsalva, bowel and bladder functions have been stable. She describes burning dysesthesias and paresthesias when the symptoms are present, more recently she has begun to notice the symptoms spreading both perianal and kayleigh-genitally. She denies squeezing sensations around the lower abdominal cavity. As the day wears on all of this continues to get worse consistently. Her quality of life and activity levels have needless to say gone through the floor.    Over the last couple of weeks she is also begun to notice similar symptoms into both upper extremities starting in the neck. Again there is this hypersensitivity and burning sensations in a very nonspecific pattern bilaterally. She is also begun to  notice persistent skin discomfort even to nonpainful stimulus throughout.    She initially saw Dr. Conklin when the symptoms had been lumbar only, imaging of the lumbar spine was unremarkable except for minimal degenerative changes, and thus she was imaged from the neck down, again everything unremarkable. She was given a Medrol Dosepak which provided only limited benefit. Ibuprofen again has been inconsistent with benefit. She has not been on any other treatments. She has stopped her physical therapy for concerns that he will only activate the nerve pains that she is having. She now presents.    Her medical history is remarkable for migraine as a teenager, fibromyalgia also was diagnosed in the past though the symptoms resolved, hyperthyroidism as a child, status post thyroidectomy in 2010. She is also being evaluated for possible celiac disease. There is no diagnosis established for seizures, CVA, MS, other autoimmune disease, diabetes, hypertension, CAD, liver or kidney disease, blood dyscrasia, psychiatric disease, asthma, IBD, neurodegenerative disease or PVD. There is no surgical history of note other than her thyroidectomy. No one in the family that she is aware of has ever suffer from migraine or similar symptoms, her brother passed away years ago. Her father evidently has a pituitary adenoma. She has no children. 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 She is on Levoxyl.    Review of Systems   Constitutional: Positive for malaise/fatigue.   Gastrointestinal: Negative for constipation.   Genitourinary: Negative for frequency.   Musculoskeletal: Positive for back pain and myalgias. Negative for falls, joint pain and neck pain.   Neurological: Positive for tingling, sensory change and focal weakness. Negative for tremors and headaches.   Psychiatric/Behavioral: Negative for depression and memory loss.        Objective:     /60   Pulse 67   Temp  "36.7 °C (98.1 °F)   Resp 16   Ht 1.727 m (5' 8\")   Wt 60.6 kg (133 lb 8 oz)   SpO2 99%   BMI 20.30 kg/m²      Physical Exam    She appears in no acute distress. Her vital signs are stable. There is no malar rash, jaw or temporal tenderness, jaw claudication, or allodynia. The neck is supple, there is no spasm, spinous processes are nontender, range of motion is full. Carotid pulses are present without asymmetry. Cardiac evaluation is unremarkable. Trigger points are not identified throughout. There is no significant muscle tenderness to palpation, there is no evidence of diffuse joint swelling or tenderness. There is no rash.    Cognition is intact, there is no focal cognitive or language deficit. PERRLA/EOMI, visual fields are full, funduscopic exam is benign, facial movements remain symmetric, there is no sensory loss across the midline to temperature, light touch or pinprick, the tongue and uvular midline, jaw jerk is absent, shoulder shrug and head rotation are intact. Tone is normal throughout, there is no tremor, asterixis, or drift. Strength is intact with good effort bilaterally, reflexes are present at all points without asymmetries, none dropped, both toes are downgoing. She walks with normal station, heel, toe, and tandem walking are intact. Repetitive movements with the hands, fingers and feet are also intact and symmetric. There is no appendicular dystaxia. Sensory exam is intact to vibration, temperature, pinprick and JPS. Romberg is absent.     Assessment/Plan:     1. Paresthesias  Most structural pathologies have been ruled out, but she does suffer from a history of thyroiditis, I wonder if in fact we are dealing with an autoimmune Hashimoto's disease. She is also being worked up for celiac disease, and thus we might be dealing with a more diffuse autoimmune process. She has a history of migraine headache, review of records indicates she has a long list of sensitivity to medications, so I wonder " if in fact we are dealing also with a primary pain syndrome such as fibromyalgia. Obviously the latter diagnosis can be made only if everything else has been ruled out.    We do know is that this is not MS, any type of acute or chronic demyelinating polyneuropathy, but a primary axonal sensory neuropathy still needs to be checked. Autoimmune diseases are known to cause this type of peripheral process. Neurophysiologic studies are indicated to help classify the process if it can be detected, and antibody titers to rule out autoimmune thyroid disease. Depending on these results, CSF studies might also be necessary.    The nature of all of the above was reviewed in full. We also talked about fibromyalgia, and even as a primary pain condition, it can explain all of the symptoms that she is now dealing with. We talked about symptomatic relief, but for now she would like to hold until a clearer picture as to cause is found. I will follow-up with her after EMG/NCV studies are done.    - ANTITHYROGLOBULIN AB; Future  - THYROID PEROXIDASE  (TPO) AB; Future  - THYROID ANTIBODIES  - REFERRAL TO NEURODIAGNOSTICS (EEG,EP,EMG/NCS/DBS) Modality Requested: EMG/NCS-Comment Extremities  - TSH+FREE T4    Time: Evaluation of 60 minutes for exam, review, discussion, and education  Discussion: As mentioned in the assessment, over 50% of the time spent face-to-face counseling and correlating care. She cannot really communicate

## 2017-12-05 ENCOUNTER — HOSPITAL ENCOUNTER (OUTPATIENT)
Dept: LAB | Facility: MEDICAL CENTER | Age: 37
End: 2017-12-05
Attending: PSYCHIATRY & NEUROLOGY
Payer: COMMERCIAL

## 2017-12-05 DIAGNOSIS — R20.2 PARESTHESIAS: ICD-10-CM

## 2017-12-05 LAB
CRP SERPL HS-MCNC: <0.2 MG/L (ref 0–7.5)
ERYTHROCYTE [SEDIMENTATION RATE] IN BLOOD BY WESTERGREN METHOD: 3 MM/HOUR (ref 0–20)
T4 FREE SERPL-MCNC: 1.09 NG/DL (ref 0.53–1.43)
THYROPEROXIDASE AB SERPL-ACNC: 0.8 IU/ML (ref 0–9)
TSH SERPL DL<=0.005 MIU/L-ACNC: 1.25 UIU/ML (ref 0.3–3.7)

## 2017-12-05 PROCEDURE — 86376 MICROSOMAL ANTIBODY EACH: CPT

## 2017-12-05 PROCEDURE — 86800 THYROGLOBULIN ANTIBODY: CPT

## 2017-12-05 PROCEDURE — 84443 ASSAY THYROID STIM HORMONE: CPT

## 2017-12-05 PROCEDURE — 86038 ANTINUCLEAR ANTIBODIES: CPT

## 2017-12-05 PROCEDURE — 85652 RBC SED RATE AUTOMATED: CPT

## 2017-12-05 PROCEDURE — 36415 COLL VENOUS BLD VENIPUNCTURE: CPT

## 2017-12-05 PROCEDURE — 84439 ASSAY OF FREE THYROXINE: CPT

## 2017-12-05 PROCEDURE — 86141 C-REACTIVE PROTEIN HS: CPT

## 2017-12-07 LAB
NUCLEAR IGG SER QL IA: NORMAL
THYROGLOB AB SERPL-ACNC: <0.9 IU/ML (ref 0–4)

## 2017-12-11 ENCOUNTER — TELEPHONE (OUTPATIENT)
Dept: NEUROLOGY | Facility: MEDICAL CENTER | Age: 37
End: 2017-12-11

## 2017-12-11 NOTE — TELEPHONE ENCOUNTER
----- Message from Anuja Knight, Med Ass't sent at 12/8/2017  3:24 PM PST -----  Regarding: results  Patient would like the results of her lab tests.    Thank you,  Anuja

## 2017-12-12 NOTE — TELEPHONE ENCOUNTER
Called and advised the patient that Dr. Howe was happy with all the results of her blood work everything is within normal limits

## 2017-12-13 ENCOUNTER — TELEPHONE (OUTPATIENT)
Dept: NEUROLOGY | Facility: MEDICAL CENTER | Age: 37
End: 2017-12-13

## 2017-12-13 NOTE — TELEPHONE ENCOUNTER
Patient called for Dr. Howe and is wondering if he can order blood work pertaining to Celiac disease? She states she has not had gluten in her diet for the past 5 weeks and says she has heard it can cause neuropathy which is one of the reasons she sees Dr. Howe. Please advise.

## 2017-12-14 ENCOUNTER — TELEPHONE (OUTPATIENT)
Dept: PHYSICAL THERAPY | Facility: REHABILITATION | Age: 37
End: 2017-12-14

## 2017-12-14 NOTE — OP THERAPY DISCHARGE SUMMARY
Outpatient Physical Therapy  DISCHARGE SUMMARY NOTE      [facility] [address]  [address]  [place] [address]  Phone:  [phone]  Fax:  [phone]    Date of Visit: 12/14/2017    Patient: Lisa Arevalo  YOB: 1980  MRN: [id number]     Referring Provider: Dylon Stevenson MD   Referring Diagnosis Lumbar radiculopathy     Physical Therapy Occurrence Codes    Date of onset of impairment:  7/3/17   Date physical therapy care plan established or reviewed:  10/3/17   Date physical therapy treatment started:  10/3/17          Functional Limitation G-Codes and Severity Modifiers     Goal:     Discharge:         Your patient is being discharged from Physical Therapy with the following comments:   · Patient symptoms progressing and worse with bilateral LE paresthesia and dysesthesia  · Physical therapy and activity increases symptoms    Comments:      Limitations Remaining:Please see progress notes      Recommendations:D/C   Refer back to MD for additional evaluation/assessment/treatment    Jo Ann Braxton PT, MSPT    Date: 12/14/2017

## 2017-12-14 NOTE — TELEPHONE ENCOUNTER
Tell the patient I don't treat celiac disease, this is something she needs to speak about with her gastroenterologist or her primary care physician.   done

## 2017-12-15 ENCOUNTER — OFFICE VISIT (OUTPATIENT)
Dept: MEDICAL GROUP | Facility: MEDICAL CENTER | Age: 37
End: 2017-12-15
Payer: COMMERCIAL

## 2017-12-15 ENCOUNTER — HOSPITAL ENCOUNTER (OUTPATIENT)
Dept: LAB | Facility: MEDICAL CENTER | Age: 37
End: 2017-12-15
Attending: NURSE PRACTITIONER
Payer: COMMERCIAL

## 2017-12-15 VITALS
HEART RATE: 68 BPM | HEIGHT: 68 IN | SYSTOLIC BLOOD PRESSURE: 108 MMHG | WEIGHT: 132.4 LBS | DIASTOLIC BLOOD PRESSURE: 60 MMHG | OXYGEN SATURATION: 99 % | BODY MASS INDEX: 20.07 KG/M2 | TEMPERATURE: 98.4 F

## 2017-12-15 DIAGNOSIS — R52 PAIN OF MULTIPLE SITES: ICD-10-CM

## 2017-12-15 DIAGNOSIS — E89.0 HYPOTHYROIDISM, POSTSURGICAL: ICD-10-CM

## 2017-12-15 DIAGNOSIS — K90.41 GLUTEN INTOLERANCE: ICD-10-CM

## 2017-12-15 DIAGNOSIS — E53.8 B12 DEFICIENCY: ICD-10-CM

## 2017-12-15 DIAGNOSIS — F41.9 ANXIETY: ICD-10-CM

## 2017-12-15 PROBLEM — M25.50 MULTIPLE JOINT PAIN: Status: ACTIVE | Noted: 2017-12-15

## 2017-12-15 PROCEDURE — 83516 IMMUNOASSAY NONANTIBODY: CPT

## 2017-12-15 PROCEDURE — 99214 OFFICE O/P EST MOD 30 MIN: CPT | Performed by: NURSE PRACTITIONER

## 2017-12-15 PROCEDURE — 82784 ASSAY IGA/IGD/IGG/IGM EACH: CPT

## 2017-12-15 PROCEDURE — 36415 COLL VENOUS BLD VENIPUNCTURE: CPT

## 2017-12-16 PROBLEM — R52 PAIN OF MULTIPLE SITES: Status: ACTIVE | Noted: 2017-12-16

## 2017-12-16 PROBLEM — K90.41 GLUTEN INTOLERANCE: Status: ACTIVE | Noted: 2017-12-16

## 2017-12-16 PROBLEM — F41.9 ANXIETY: Status: ACTIVE | Noted: 2017-12-16

## 2017-12-16 NOTE — ASSESSMENT & PLAN NOTE
Difficulty with upset stomach, diarrhea last several years when eating gluten  Was evaluated at one point by GI, had colonoscopy which was normal but she had been off gluten for quite a while prior  A few weeks ago started incorporating gluten back into her diet and again noticed difficulty with bloating, abd discomfort and diarrhea  Has another appointment with GI scheduled  Now back on gluten free diet  No blood or mucus in stool, nausea, vomiting

## 2017-12-16 NOTE — ASSESSMENT & PLAN NOTE
Long standing problem, worse with increase in pain the last few weeks. Waking at night with panic attacks at times  Exercising and meditation helps  Has gone to counseling  Strongly prefers to avoid medication  Denies depression, si/hi, appetite changes

## 2017-12-16 NOTE — PROGRESS NOTES
"Subjective:     Chief Complaint   Patient presents with   • Referral Needed     rheumatology   • Celiac Disease     Blood Test    • Thyroid Problem     levoxil to synthroid change     Lisa Arevalo is a 37 y.o. female here today to follow up on:  Hypothyroidism, postsurgical  Recent labs reviewed, tsh and t4 in good range with levoxyl  B12 deficiency  On supplement  Vegetarian diet  Recent lab in normal range  Pain of multiple sites  Diagnosed at one point with fibromyalgia  Recently having increased and different pain than usual for her  Seen in Oct with acute back pain, was seen by Rubin and had MRIs L4-L5 disc bulg  Was having sensation change in back, genitals, pain radiating down legs, tingling/ restless sensation in arms and legs at times  Acute flare resolved but continues having multiple joint pain and \"nerve pain everywhere\", achy muscles, painful joints in hands  Has noticed eating gluten worsens symptoms  Symptoms contribute to anxiety  Was given trial of gabapentin with SE- weakness  Recent MRIs reviewed with no acute concerns. MRI of the brain in 2013 normal  Anxiety  Long standing problem, worse with increase in pain the last few weeks. Waking at night with panic attacks at times  Exercising and meditation helps  Has gone to counseling  Strongly prefers to avoid medication  Denies depression, si/hi, appetite changes  Gluten intolerance  Difficulty with upset stomach, diarrhea last several years when eating gluten  Was evaluated at one point by GI, had colonoscopy which was normal but she had been off gluten for quite a while prior  A few weeks ago started incorporating gluten back into her diet and again noticed difficulty with bloating, abd discomfort and diarrhea  Has another appointment with GI scheduled  Now back on gluten free diet  No blood or mucus in stool, nausea, vomiting     Current medicines (including changes today)  Current Outpatient Prescriptions   Medication Sig Dispense Refill " "  • LEVOXYL 100 MCG Tab Take 1 Tab by mouth Every morning on an empty stomach. 30 Tab 3     No current facility-administered medications for this visit.      She  has a past medical history of Arrhythmia; Cold; Fibromyalgia; Heart burn; Hyperthyroidism (2010); Hypothyroidism, postsurgical (2010); Indigestion; Interstitial cystitis; Migraine; and Renal disorder.    ROS included above     Objective:     Blood pressure 108/60, pulse 68, temperature 36.9 °C (98.4 °F), height 1.727 m (5' 8\"), weight 60.1 kg (132 lb 6.4 oz), last menstrual period 11/27/2017, SpO2 99 %, not currently breastfeeding. Body mass index is 20.13 kg/m².     Physical Exam:  General: Alert, oriented in no acute distress.  Eye contact is good, speech is normal, affect calm  Lungs: clear to auscultation bilaterally, normal effort, no wheeze/ rhonchi/ rales.  CV: regular rate and rhythm, S1, S2, no murmur  Abdomen: soft, nontender, nondistended, no hepatosplenomegaly  MS: no joint swelling or redness, strength is 5/5 throughout, no point tenderness over the spine, shoulder joints, hips, knees  Ext: no edema, color normal, vascularity normal, temperature normal    Assessment and Plan:   The following treatment plan was discussed   1. Pain of multiple sites  Wide spread pain, restless and tingling sensation in arms and legs worse in the last few weeks. Autoimmune work up negative, multiple MRIs without significant abnormality. Discussed prior diagnosis of fibromyalgia, anxiety as contributing factor.   2. Hypothyroidism, postsurgical  Stable, continue levoxyl   3. Gluten intolerance  Bloating, abd discomfort and diarrhea with gluten. Has upcoming appt with GI  CELIAC DISEASE AB PANEL   4. B12 deficiency  Stable, continue supplement   5. Anxiety  Long standing issue, likely contributing to pain complaints. Benefits of SSRIs discussed. Pt prefers to avoid medication at this time and will notify me if she is interested. Enc to continue with regular " exercise, meditation, counseling.       Followup: pending labs         Please note that this dictation was created using voice recognition software. I have worked with consultants from the vendor as well as technical experts from [facility] to optimize the interface. I have made every reasonable attempt to correct obvious errors, but I expect that there are errors of grammar and possibly content that I did not discover before finalizing the note.

## 2017-12-17 LAB
IGA SERPL-MCNC: 201 MG/DL (ref 68–408)
TTG IGA SER IA-ACNC: 0 U/ML (ref 0–3)

## 2017-12-19 ENCOUNTER — TELEPHONE (OUTPATIENT)
Dept: NEUROLOGY | Facility: MEDICAL CENTER | Age: 37
End: 2017-12-19

## 2017-12-19 ENCOUNTER — APPOINTMENT (OUTPATIENT)
Dept: OTHER | Facility: IMAGING CENTER | Age: 37
End: 2017-12-19

## 2017-12-19 PROCEDURE — 97530 THERAPEUTIC ACTIVITIES: CPT | Performed by: FAMILY MEDICINE

## 2017-12-19 NOTE — TELEPHONE ENCOUNTER
"Patient called for Dr. Howe and states she is having additional symptoms since her last appt. She describes them as \"spasms that go throughout the body, restless right arm and feet. Worse at night, throbbing sensation. Hands, arms (mostly the right), feet and legs all feel restless.\" Does she need to come in for another appt to be evaluated or is there something she can do? She also said she was concerned about MS. Please advise.   "

## 2017-12-20 NOTE — TELEPHONE ENCOUNTER
When I had seen her, I informed she and her  that she does not have MS, and I still believe this to be the case. Her blood work revealed no evidence of autoimmune disease related to her Hashimoto's disease. We are still waiting for her neurophysiologic studies to be done, but until then, I very limited as to what I can do from a treatment standpoint. We are left with symptomatic relief such as with gabapentin.

## 2017-12-27 RX ORDER — LEVOTHYROXINE SODIUM 100 UG/1
100 TABLET ORAL
Qty: 30 TAB | Refills: 5 | Status: ON HOLD | OUTPATIENT
Start: 2017-12-27 | End: 2018-07-13

## 2018-01-02 ENCOUNTER — TELEPHONE (OUTPATIENT)
Dept: OBGYN | Facility: MEDICAL CENTER | Age: 38
End: 2018-01-02

## 2018-01-03 NOTE — TELEPHONE ENCOUNTER
Phone call from pt to discuss early pregnancy and screening options.Pt is scheduled for her DUB appt 2/14 and wants to make sure she does not need to be seen sooner. Pt advised we will discuss with  in am and return call with her reccomendations.maribel

## 2018-01-04 ENCOUNTER — TELEPHONE (OUTPATIENT)
Dept: OBGYN | Facility: MEDICAL CENTER | Age: 38
End: 2018-01-04

## 2018-01-04 NOTE — TELEPHONE ENCOUNTER
Phone call to pt to discuss scheduling an earlier DUB appt. Appt is scheduled on 1/17 @ 0830. Pt has expressed desire to have 1st trimester screening done, and thus earlier appt given to allow for time to get in with Perinatologists. Pregnancy questions also answered today.maribel

## 2018-01-17 ENCOUNTER — GYNECOLOGY VISIT (OUTPATIENT)
Dept: OBGYN | Facility: MEDICAL CENTER | Age: 38
End: 2018-01-17
Payer: COMMERCIAL

## 2018-01-17 VITALS
HEIGHT: 68 IN | DIASTOLIC BLOOD PRESSURE: 70 MMHG | BODY MASS INDEX: 19.85 KG/M2 | SYSTOLIC BLOOD PRESSURE: 105 MMHG | WEIGHT: 131 LBS

## 2018-01-17 DIAGNOSIS — N93.8 DUB (DYSFUNCTIONAL UTERINE BLEEDING): ICD-10-CM

## 2018-01-17 DIAGNOSIS — O09.511 ELDERLY PRIMIGRAVIDA IN FIRST TRIMESTER: ICD-10-CM

## 2018-01-17 DIAGNOSIS — E03.9 HYPOTHYROIDISM, UNSPECIFIED TYPE: ICD-10-CM

## 2018-01-17 PROCEDURE — 99213 OFFICE O/P EST LOW 20 MIN: CPT | Mod: 25 | Performed by: OBSTETRICS & GYNECOLOGY

## 2018-01-17 PROCEDURE — 76830 TRANSVAGINAL US NON-OB: CPT | Performed by: OBSTETRICS & GYNECOLOGY

## 2018-01-18 LAB — IN CLINIC OB SCAN: NORMAL

## 2018-01-19 NOTE — PROGRESS NOTES
"CC: Confirmation of Pregnancy    HPI: Pt is a 36 yo   lmp 17 who presents for evaluation of amenorrhea.  She has had no bleeding since her last menses.  A urine pregnancy test was positive.  She denies vaginal spotting or pain.  She notes nausea and vomiting.  C/O intermittent palpitations.    ROS: negative for dizziness, SOB, chest pain, palpitations, dysuria, vaginal discharge.    Blood pressure 105/70, height 1.727 m (5' 8\"), weight 59.4 kg (131 lb), last menstrual period 2017, not currently breastfeeding.    GENERAL: Alert, in no apparent distress  PSYCHIATRIC: Appropriate affect, intact insight and judgement.  ABDOMEN: Soft, nontender, nondistended.  No palpable masses. No hepatosplenomegaly.   No rebound or guarding.  No inguinal lymphadenopathy.  BACK: No CVA tenderness  EXTREMITIES: No edema, no calf tenderness.    GENITOURINARY:  Normal external genitalia, no lesions.  Normal urethral meatus, no masses or tenderness.  Normal bladder without fullness or masses.  Vagina well estrogenized, no vaginal discharge or lesions.  Cervix normal length, nontender.  Uterus normal size, shape, and contour, nontender.  Adnexa nontender, no masses.  Normal anus and perineum.      TRANSVAGINAL ULTRASOUND - performed and interpreted by me    Single gestational sac present.  Yolk sac visualized.    Johnson intrauterine pregnancy with CRL measuring 0.84 cm, c/w 6+5/7 weeks EGA, positive fetal cardiac activity noted. EDC 18.     No fluid in cul de sac.    Ovaries and cervix appear grossly normal. Cervical length = 3.82 cm.      ASSESSMENT/PLAN:  1. DUB visit - Johnson IUP at 6+5/7 wks.  Prenatal Vitamins.  F/U for NOB appointment 4 wks.  2. AMA - desires first trimester screening - referral sent  3. Hypothyroidism, palpitations - will check TSH.  If normal, will place cardiology referral.   "

## 2018-01-20 ENCOUNTER — HOSPITAL ENCOUNTER (OUTPATIENT)
Dept: LAB | Facility: MEDICAL CENTER | Age: 38
End: 2018-01-20
Attending: OBSTETRICS & GYNECOLOGY
Payer: COMMERCIAL

## 2018-01-20 DIAGNOSIS — E03.9 HYPOTHYROIDISM, UNSPECIFIED TYPE: ICD-10-CM

## 2018-01-20 LAB — TSH SERPL DL<=0.005 MIU/L-ACNC: 3.97 UIU/ML (ref 0.38–5.33)

## 2018-01-20 PROCEDURE — 84443 ASSAY THYROID STIM HORMONE: CPT

## 2018-01-20 PROCEDURE — 36415 COLL VENOUS BLD VENIPUNCTURE: CPT

## 2018-01-22 RX ORDER — LEVOTHYROXINE SODIUM 0.12 MG/1
125 TABLET ORAL
Qty: 30 TAB | Refills: 1 | Status: SHIPPED | OUTPATIENT
Start: 2018-01-22 | End: 2019-02-28

## 2018-01-25 ENCOUNTER — TELEPHONE (OUTPATIENT)
Dept: OBGYN | Facility: CLINIC | Age: 38
End: 2018-01-25

## 2018-01-26 ENCOUNTER — GYNECOLOGY VISIT (OUTPATIENT)
Dept: OBGYN | Facility: MEDICAL CENTER | Age: 38
End: 2018-01-26
Payer: COMMERCIAL

## 2018-01-26 ENCOUNTER — TELEPHONE (OUTPATIENT)
Dept: OBGYN | Facility: MEDICAL CENTER | Age: 38
End: 2018-01-26

## 2018-01-26 ENCOUNTER — HOSPITAL ENCOUNTER (OUTPATIENT)
Facility: MEDICAL CENTER | Age: 38
End: 2018-01-26
Attending: OBSTETRICS & GYNECOLOGY
Payer: COMMERCIAL

## 2018-01-26 DIAGNOSIS — O26.891 DYSURIA IN PREGNANCY IN FIRST TRIMESTER: ICD-10-CM

## 2018-01-26 DIAGNOSIS — R30.0 DYSURIA IN PREGNANCY IN FIRST TRIMESTER: ICD-10-CM

## 2018-01-26 PROCEDURE — 99213 OFFICE O/P EST LOW 20 MIN: CPT | Performed by: OBSTETRICS & GYNECOLOGY

## 2018-01-26 PROCEDURE — 87086 URINE CULTURE/COLONY COUNT: CPT

## 2018-01-26 NOTE — TELEPHONE ENCOUNTER
Dr Tolbert's office called about a referral that was sent in for the patient. Dr Tolbert would like patient to see Dr Quinones and they need the referral on [facility] updates for Dr Quinones

## 2018-01-26 NOTE — PROGRESS NOTES
"S: Lisa presents with complaint of \"possible urinary tract infection\" .  She is approximately 8 weeks pregnant. She felt it was \"hard to urinate\" a few days ago, then had a \"tickling\" sensation, and now is hurting after she urinates.  No fevers, chillls, abdominal pain, or flank pain.  No vaginal bleeding.     O:There were no vitals taken for this visit.    UA - unremarkable, except trace LE    A/P: Symptoms most likely due to her interstitial cystitis.  Urine culture sent to r/o infection. Will call if evidence of infection.    F/U for NOB as scheduled.   "

## 2018-01-29 LAB
BACTERIA UR CULT: NORMAL
SIGNIFICANT IND 70042: NORMAL
SITE SITE: NORMAL
SOURCE SOURCE: NORMAL

## 2018-01-31 ENCOUNTER — TELEPHONE (OUTPATIENT)
Dept: OBGYN | Facility: MEDICAL CENTER | Age: 38
End: 2018-01-31

## 2018-02-01 ENCOUNTER — TELEPHONE (OUTPATIENT)
Dept: OBGYN | Facility: MEDICAL CENTER | Age: 38
End: 2018-02-01

## 2018-02-01 NOTE — TELEPHONE ENCOUNTER
----- Message from Chantale Johnson M.D. sent at 1/31/2018 12:05 PM PST -----  Please call patient and inform her there is no evidence of urinary tract infection.

## 2018-02-01 NOTE — TELEPHONE ENCOUNTER
----- Message from Kira Tillman sent at 1/30/2018 10:41 AM PST -----  Regarding: questions  Contact: [phone]  Carol from  office called and states due to patients insurance patient is scheduled with  and not . She would like the referral to reflect this change thank you.

## 2018-02-01 NOTE — TELEPHONE ENCOUNTER
----- Message from Kira Tillman sent at 1/30/2018 10:41 AM PST -----  Regarding: questions  Contact: [phone]  Carol from  office called and states due to patients insurance patient is scheduled with  and not . She would like the referral to reflect this change thank you.

## 2018-02-01 NOTE — TELEPHONE ENCOUNTER
IN BASKET MESSAGE SENT TO [facility] TO CHANGE NAMES ON REFERRAL TO Juan Quinones at  [facility].

## 2018-02-15 ENCOUNTER — HOSPITAL ENCOUNTER (OUTPATIENT)
Dept: LAB | Facility: MEDICAL CENTER | Age: 38
End: 2018-02-15
Attending: OBSTETRICS & GYNECOLOGY
Payer: COMMERCIAL

## 2018-02-15 LAB
25(OH)D3 SERPL-MCNC: 28 NG/ML (ref 30–100)
ABO GROUP BLD: NORMAL
APPEARANCE UR: CLEAR
BACTERIA #/AREA URNS HPF: NEGATIVE /HPF
BASOPHILS # BLD AUTO: 0.7 % (ref 0–1.8)
BASOPHILS # BLD: 0.05 K/UL (ref 0–0.12)
BILIRUB UR QL STRIP.AUTO: NEGATIVE
BLD GP AB SCN SERPL QL: NORMAL
COLOR UR: YELLOW
CULTURE IF INDICATED INDCX: YES UA CULTURE
EOSINOPHIL # BLD AUTO: 0.1 K/UL (ref 0–0.51)
EOSINOPHIL NFR BLD: 1.3 % (ref 0–6.9)
EPI CELLS #/AREA URNS HPF: NEGATIVE /HPF
ERYTHROCYTE [DISTWIDTH] IN BLOOD BY AUTOMATED COUNT: 41.8 FL (ref 35.9–50)
GLUCOSE UR STRIP.AUTO-MCNC: NEGATIVE MG/DL
HBV SURFACE AG SER QL: NEGATIVE
HCT VFR BLD AUTO: 43 % (ref 37–47)
HGB BLD-MCNC: 14.7 G/DL (ref 12–16)
HIV 1+2 AB+HIV1 P24 AG SERPL QL IA: NON REACTIVE
HYALINE CASTS #/AREA URNS LPF: NORMAL /LPF
IMM GRANULOCYTES # BLD AUTO: 0.02 K/UL (ref 0–0.11)
IMM GRANULOCYTES NFR BLD AUTO: 0.3 % (ref 0–0.9)
KETONES UR STRIP.AUTO-MCNC: NEGATIVE MG/DL
LEUKOCYTE ESTERASE UR QL STRIP.AUTO: ABNORMAL
LYMPHOCYTES # BLD AUTO: 2.22 K/UL (ref 1–4.8)
LYMPHOCYTES NFR BLD: 29.9 % (ref 22–41)
MCH RBC QN AUTO: 32.6 PG (ref 27–33)
MCHC RBC AUTO-ENTMCNC: 34.2 G/DL (ref 33.6–35)
MCV RBC AUTO: 95.3 FL (ref 81.4–97.8)
MICRO URNS: ABNORMAL
MONOCYTES # BLD AUTO: 0.39 K/UL (ref 0–0.85)
MONOCYTES NFR BLD AUTO: 5.2 % (ref 0–13.4)
NEUTROPHILS # BLD AUTO: 4.65 K/UL (ref 2–7.15)
NEUTROPHILS NFR BLD: 62.6 % (ref 44–72)
NITRITE UR QL STRIP.AUTO: NEGATIVE
NRBC # BLD AUTO: 0 K/UL
NRBC BLD-RTO: 0 /100 WBC
PH UR STRIP.AUTO: 6 [PH]
PLATELET # BLD AUTO: 200 K/UL (ref 164–446)
PMV BLD AUTO: 10.5 FL (ref 9–12.9)
PROT UR QL STRIP: NEGATIVE MG/DL
RBC # BLD AUTO: 4.51 M/UL (ref 4.2–5.4)
RBC # URNS HPF: NORMAL /HPF
RBC UR QL AUTO: NEGATIVE
RH BLD: NORMAL
RUBV AB SER QL: >500 IU/ML
SP GR UR STRIP.AUTO: 1
TREPONEMA PALLIDUM IGG+IGM AB [PRESENCE] IN SERUM OR PLASMA BY IMMUNOASSAY: NON REACTIVE
UROBILINOGEN UR STRIP.AUTO-MCNC: 0.2 MG/DL
VIT B12 SERPL-MCNC: 544 PG/ML (ref 211–911)
WBC # BLD AUTO: 7.4 K/UL (ref 4.8–10.8)
WBC #/AREA URNS HPF: NORMAL /HPF

## 2018-02-15 PROCEDURE — 86850 RBC ANTIBODY SCREEN: CPT

## 2018-02-15 PROCEDURE — 86900 BLOOD TYPING SEROLOGIC ABO: CPT

## 2018-02-15 PROCEDURE — 36415 COLL VENOUS BLD VENIPUNCTURE: CPT

## 2018-02-15 PROCEDURE — 86780 TREPONEMA PALLIDUM: CPT

## 2018-02-15 PROCEDURE — 82607 VITAMIN B-12: CPT

## 2018-02-15 PROCEDURE — 85025 COMPLETE CBC W/AUTO DIFF WBC: CPT

## 2018-02-15 PROCEDURE — 82306 VITAMIN D 25 HYDROXY: CPT

## 2018-02-15 PROCEDURE — 87389 HIV-1 AG W/HIV-1&-2 AB AG IA: CPT

## 2018-02-15 PROCEDURE — 86762 RUBELLA ANTIBODY: CPT

## 2018-02-15 PROCEDURE — 87086 URINE CULTURE/COLONY COUNT: CPT

## 2018-02-15 PROCEDURE — 86901 BLOOD TYPING SEROLOGIC RH(D): CPT

## 2018-02-15 PROCEDURE — 81001 URINALYSIS AUTO W/SCOPE: CPT

## 2018-02-15 PROCEDURE — 87340 HEPATITIS B SURFACE AG IA: CPT

## 2018-02-17 LAB
BACTERIA UR CULT: ABNORMAL
BACTERIA UR CULT: ABNORMAL
SIGNIFICANT IND 70042: ABNORMAL
SITE SITE: ABNORMAL
SOURCE SOURCE: ABNORMAL

## 2018-02-21 ENCOUNTER — HOSPITAL ENCOUNTER (OUTPATIENT)
Facility: MEDICAL CENTER | Age: 38
End: 2018-02-21
Attending: OBSTETRICS & GYNECOLOGY
Payer: COMMERCIAL

## 2018-02-21 PROCEDURE — 87086 URINE CULTURE/COLONY COUNT: CPT

## 2018-02-24 LAB
BACTERIA UR CULT: NORMAL
SIGNIFICANT IND 70042: NORMAL
SITE SITE: NORMAL
SOURCE SOURCE: NORMAL

## 2018-03-05 ENCOUNTER — TELEPHONE (OUTPATIENT)
Dept: MEDICAL GROUP | Facility: MEDICAL CENTER | Age: 38
End: 2018-03-05

## 2018-03-05 DIAGNOSIS — G89.29 CHRONIC LOW BACK PAIN, UNSPECIFIED BACK PAIN LATERALITY, WITH SCIATICA PRESENCE UNSPECIFIED: ICD-10-CM

## 2018-03-05 DIAGNOSIS — M54.5 CHRONIC LOW BACK PAIN, UNSPECIFIED BACK PAIN LATERALITY, WITH SCIATICA PRESENCE UNSPECIFIED: ICD-10-CM

## 2018-03-05 NOTE — TELEPHONE ENCOUNTER
1. Caller Name: Lisa Arevalo                                         Call Back Number: [phone] (home)       Patient approves a detailed voicemail message: SHERYL    Pt called requesting a new referral for physical therapy for lower back pain.

## 2018-03-06 ENCOUNTER — PHYSICAL THERAPY (OUTPATIENT)
Dept: PHYSICAL THERAPY | Facility: REHABILITATION | Age: 38
End: 2018-03-06
Attending: NURSE PRACTITIONER
Payer: COMMERCIAL

## 2018-03-06 DIAGNOSIS — M54.40 ACUTE BILATERAL LOW BACK PAIN WITH SCIATICA, SCIATICA LATERALITY UNSPECIFIED: ICD-10-CM

## 2018-03-06 PROCEDURE — 97140 MANUAL THERAPY 1/> REGIONS: CPT

## 2018-03-06 PROCEDURE — 97163 PT EVAL HIGH COMPLEX 45 MIN: CPT

## 2018-03-06 ASSESSMENT — ENCOUNTER SYMPTOMS
EXACERBATED BY: SITTING
ALLEVIATING FACTORS: RELAXATION
EXACERBATED BY: BENDING
EXACERBATED BY: WALKING
PAIN TIMING: EVERY MORNING
PAIN SCALE AT HIGHEST: 7
PAIN SCALE: 0
PAIN TIMING: IN THE EVENING
PAIN SCALE AT LOWEST: 0
ALLEVIATING FACTORS: POSITION CHANGE
QUALITY: SHARP
ALLEVIATING FACTORS: POSITIONING
PAIN TIMING: INTERMITTENT

## 2018-03-07 ENCOUNTER — OFFICE VISIT (OUTPATIENT)
Dept: NEUROLOGY | Facility: MEDICAL CENTER | Age: 38
End: 2018-03-07
Payer: COMMERCIAL

## 2018-03-07 VITALS
WEIGHT: 132.61 LBS | DIASTOLIC BLOOD PRESSURE: 62 MMHG | SYSTOLIC BLOOD PRESSURE: 100 MMHG | BODY MASS INDEX: 20.1 KG/M2 | HEIGHT: 68 IN | RESPIRATION RATE: 15 BRPM | HEART RATE: 85 BPM | TEMPERATURE: 98.4 F | OXYGEN SATURATION: 99 %

## 2018-03-07 DIAGNOSIS — R20.2 PARESTHESIAS: Primary | ICD-10-CM

## 2018-03-07 PROCEDURE — 99213 OFFICE O/P EST LOW 20 MIN: CPT | Performed by: PSYCHIATRY & NEUROLOGY

## 2018-03-07 ASSESSMENT — ENCOUNTER SYMPTOMS
TINGLING: 1
MEMORY LOSS: 0
SENSORY CHANGE: 0
FOCAL WEAKNESS: 0

## 2018-03-07 ASSESSMENT — PAIN SCALES - GENERAL: PAINLEVEL: 5=MODERATE PAIN

## 2018-03-07 NOTE — PROGRESS NOTES
"Subjective:      Lisa Arevalo is a 37 y.o. female who presents for follow-up with a history of diffuse paresthesias.    HPI    Since last seen, she states that the paresthesias have continued in a fairly similar pattern. There are episodic, involving the back and distal lower extremities, less often but still consistently with the arms and hands. She can be symptom free for longer intervals, but they invariably rear their ugly heads. They are still random, there is no weakness, there is no actual sensory loss. She now has some occasional paresthesias over the vertex. Cognition has been intact. She denies any visual loss or bulbar symptoms.    She is now about 14 weeks pregnant, so she held on her EMG/NCV studies being done. Thyroid function, thyroid globulin antibody and thyroid peroxidase antibody titers were negative. She is not on any symptomatic relief at this time.    Medical, surgical and family histories are reviewed, there are no new drug allergies. Her workup for celiac disease so far has proven unremarkable vis-à-vis the negative blood work, but she is pending a formal GI evaluation. She is on Synthroid and a prenatal vitamin.    Review of Systems   Constitutional: Negative for malaise/fatigue.   Skin: Negative for itching and rash.   Neurological: Positive for tingling. Negative for sensory change and focal weakness.   Psychiatric/Behavioral: Negative for memory loss.   All other systems reviewed and are negative.       Objective:     /62   Pulse 85   Temp 36.9 °C (98.4 °F)   Resp 15   Ht 1.727 m (5' 8\")   Wt 60.2 kg (132 lb 9.7 oz)   SpO2 99%   BMI 20.16 kg/m²      Physical Exam    She appears in no acute distress. Her vital signs are stable. There was no malar rash or temporal tenderness. The neck is supple, range of motion is full. Cardiac evaluation is unremarkable. Including mental status, cranial nerve, motor, reflex, coordination, and sensory evaluations, her examination remains " fully intact.     Assessment/Plan:     1. Paresthesias  Still of unclear etiology, if there is any peripheral process, it is most likely a small fiber sensory disorder. NCV studies can be done safely even in pregnancy, I confirmed this with both our neurophysiologists, her study will be rescheduled. I will follow up with her afterwards. Depending on these results, subsequent blood work can be ordered. Obviously, and especially now with pregnancy, we will hold on medication for symptomatic relief. I will follow-up with her after diagnostics are completed, we will call her in the interim if they are abnormal and indicative of a process that needs to be more quickly evaluated, especially if it could affect the outcome of her pregnancy, though I doubt this would be the case. Face-to-face time was spent talking about all of this, she was reassured that the pregnancy itself will in all likelihood progress without complication from a neurologic standpoint.    Time: Evaluation 20 minutes for exam, review, discussion, and education  Discussion: As mentioned in the assessment, over 70% of the time spent face-to-face counseling and coordinating care

## 2018-03-08 ENCOUNTER — PHYSICAL THERAPY (OUTPATIENT)
Dept: PHYSICAL THERAPY | Facility: REHABILITATION | Age: 38
End: 2018-03-08
Attending: NURSE PRACTITIONER
Payer: COMMERCIAL

## 2018-03-08 DIAGNOSIS — M54.40 ACUTE BILATERAL LOW BACK PAIN WITH SCIATICA, SCIATICA LATERALITY UNSPECIFIED: ICD-10-CM

## 2018-03-08 PROCEDURE — 97140 MANUAL THERAPY 1/> REGIONS: CPT

## 2018-03-08 PROCEDURE — 97110 THERAPEUTIC EXERCISES: CPT

## 2018-03-08 NOTE — OP THERAPY DAILY TREATMENT
Outpatient Physical Therapy  DAILY TREATMENT     [facility] Physical Therapy [address].  [address]  [place] [address]  Phone:  [phone]  Fax:  [phone]    Date: 03/08/2018    Patient: Lisa Arevalo  YOB: 1980  MRN: [id number]     Time Calculation  Start time: 1000  Stop time: 1040 Time Calculation (min): 40 minutes     Chief Complaint: No chief complaint on file.    Visit #: 2    SUBJECTIVE:Better, decreased lumbar/thoracic tightness      OBJECTIVE:  Current objective measures:           Therapeutic Exercises (CPT 84126):     1. Plank front, 2 x 30 sec    2. Side plank, 2 x 30 sec    3. Bridge on ball, 10 x 30 sec    Therapeutic Treatments and Modalities:     1. Manual Therapy (CPT 57918), IASTM lumbar paraspinals, multifidi, QL MFR, sacral mob to correct forward sacral torsion,     Time-based treatments/modalities:  Manual therapy minutes (CPT 50506): 25 minutes  Therapeutic exercise minutes (CPT 61327): 12 minutes       Pain rating before treatment: 1  Pain rating after treatment: 0    ASSESSMENT:   Response to treatment: feeling better, no sacral or coccyx pain post mobilization/ther ex, poor stability hips and TA    PLAN/RECOMMENDATIONS:   Plan for treatment: therapy treatment to continue next visit.  Planned interventions for next visit: E-stim unattended (CPT 69719), manual therapy (CPT 73801), neuromuscular re-education (CPT 61849) and therapeutic exercise (CPT 58745). Progress core stabiliztion, hip stabilization there ex. IASTM right lumbar paraspinals

## 2018-03-13 ENCOUNTER — PHYSICAL THERAPY (OUTPATIENT)
Dept: PHYSICAL THERAPY | Facility: REHABILITATION | Age: 38
End: 2018-03-13
Attending: NURSE PRACTITIONER
Payer: COMMERCIAL

## 2018-03-13 DIAGNOSIS — M54.40 ACUTE BILATERAL LOW BACK PAIN WITH SCIATICA, SCIATICA LATERALITY UNSPECIFIED: ICD-10-CM

## 2018-03-13 PROCEDURE — 97112 NEUROMUSCULAR REEDUCATION: CPT

## 2018-03-13 PROCEDURE — 97110 THERAPEUTIC EXERCISES: CPT

## 2018-03-13 NOTE — OP THERAPY DAILY TREATMENT
Outpatient Physical Therapy  DAILY TREATMENT     [facility] Physical [address].  [address]  [place] [address]  Phone:  [phone]  Fax:  [phone]    Date: 03/13/2018    Patient: Lisa Arevalo  YOB: 1980  MRN: [id number]     Time Calculation  Start time: 1030  Stop time: 1105 Time Calculation (min): 35 minutes     Chief Complaint: No chief complaint on file.    Visit #: 3    SUBJECTIVE:  Increased r>left central lumbar pain when bending over this past weekend difficulty walking       OBJECTIVE:  Current objective measures:          Therapeutic Exercises (CPT 59466):     2. Body mechanics supine to sit    3. Bridge on ball, 10 x 30 sec    4. TA Stabilization ball roll, march BKFO, 1 x 30 each    5. Quadraped rocking with neutral spine, 1 x 30    6. Wall ball  squat with isometric hip abduction, 1 x 20    Therapeutic Treatments and Modalities:     1. Manual Therapy (CPT 05722), IASTM lumbar paraspinals, multifidi, QL MFR, sacral mob to correct forward sacral torsion,     Time-based treatments/modalities:  Therapeutic exercise minutes (CPT 46796): 21 minutes  Neuromusc re-ed, balance, coor, post minutes (CPT 35207): 10 minutes       Pain rating before treatment: 2  Pain rating after treatment: 1    ASSESSMENT:   Response to treatment: left lumbar/thoracic paraspinal hypertonic, right unilateral L% PA tender and hypersensitive.  Right trendelenberg    PLAN/RECOMMENDATIONS:   Plan for treatment: therapy treatment to continue next visit.  Planned interventions for next visit: continue with current treatment. TA stab and hip strength/stability

## 2018-03-15 ENCOUNTER — APPOINTMENT (OUTPATIENT)
Dept: PHYSICAL THERAPY | Facility: REHABILITATION | Age: 38
End: 2018-03-15
Attending: NURSE PRACTITIONER
Payer: COMMERCIAL

## 2018-03-19 ENCOUNTER — PHYSICAL THERAPY (OUTPATIENT)
Dept: PHYSICAL THERAPY | Facility: REHABILITATION | Age: 38
End: 2018-03-19
Attending: NURSE PRACTITIONER
Payer: COMMERCIAL

## 2018-03-19 DIAGNOSIS — M54.40 ACUTE BILATERAL LOW BACK PAIN WITH SCIATICA, SCIATICA LATERALITY UNSPECIFIED: ICD-10-CM

## 2018-03-19 PROCEDURE — 97140 MANUAL THERAPY 1/> REGIONS: CPT

## 2018-03-19 PROCEDURE — 97110 THERAPEUTIC EXERCISES: CPT

## 2018-03-19 NOTE — OP THERAPY DAILY TREATMENT
Outpatient Physical Therapy  DAILY TREATMENT     [facility] Physical [address].  [address]  [place] [address]  Phone:  [phone]  Fax:  [phone]    Date: 03/19/2018    Patient: Lisa Arevalo  YOB: 1980  MRN: [id number]     Time Calculation  Start time: 1030  Stop time: 1125 Time Calculation (min): 55 minutes     Chief Complaint: No chief complaint on file.    Visit #: 4    SUBJECTIVE:  Hurting pretty bad lumbar/sacral region, continued mid thoracic stiffness/pain.  Patient ordered SI/pregnancy belt       OBJECTIVE:  Current objective measures:           Therapeutic Exercises (CPT 34805):     2. Body mechanics supine to sit    3. Bridge without ball with iso hip abduction. pink tband, 10 x 30 sec    4. TA Stabilization ball roll, march BKFO with iso hip abduction pink tband, 1 x 30 each    5. Quadraped rocking with neutral spine, 1 x 30    6. Wall ball  squat with isometric hip abduction, 1 x 20    7. Elliptical, 15 min L3    Therapeutic Treatments and Modalities:     1. Manual Therapy (CPT 62811), IASTM lumbar paraspinals, multifidi, QL MFR, sacral mob to correct forward sacral torsion,     Time-based treatments/modalities:  Manual therapy minutes (CPT 73079): 12 minutes  Therapeutic exercise minutes (CPT 99019): 35 minutes       Pain rating before treatment: 6  Pain rating after treatment: 2    ASSESSMENT:   Response to treatment: decreased pain with TA stabilization exercise, core stability, patient able to complete 15 min on elliptical and was symptom free post there ex. Patient was very pleased with outcomes today.    PLAN/RECOMMENDATIONS:   Plan for treatment: therapy treatment to continue next visit.  Planned interventions for next visit: continue with current treatment. Progress core strength/hip strength next visit.

## 2018-03-20 ENCOUNTER — APPOINTMENT (OUTPATIENT)
Dept: PHYSICAL THERAPY | Facility: REHABILITATION | Age: 38
End: 2018-03-20
Attending: NURSE PRACTITIONER
Payer: COMMERCIAL

## 2018-03-22 ENCOUNTER — APPOINTMENT (OUTPATIENT)
Dept: PHYSICAL THERAPY | Facility: REHABILITATION | Age: 38
End: 2018-03-22
Attending: NURSE PRACTITIONER
Payer: COMMERCIAL

## 2018-03-22 ENCOUNTER — APPOINTMENT (OUTPATIENT)
Dept: NEUROLOGY | Facility: MEDICAL CENTER | Age: 38
End: 2018-03-22
Payer: COMMERCIAL

## 2018-03-23 ENCOUNTER — HOSPITAL ENCOUNTER (OUTPATIENT)
Dept: LAB | Facility: MEDICAL CENTER | Age: 38
End: 2018-03-23
Attending: NURSE PRACTITIONER
Payer: COMMERCIAL

## 2018-03-23 ENCOUNTER — TELEPHONE (OUTPATIENT)
Dept: MEDICAL GROUP | Facility: MEDICAL CENTER | Age: 38
End: 2018-03-23

## 2018-03-23 DIAGNOSIS — E89.0 HYPOTHYROIDISM, POSTSURGICAL: ICD-10-CM

## 2018-03-23 LAB
T4 FREE SERPL-MCNC: 1.02 NG/DL (ref 0.53–1.43)
TSH SERPL DL<=0.005 MIU/L-ACNC: 1.86 UIU/ML (ref 0.38–5.33)

## 2018-03-23 PROCEDURE — 84443 ASSAY THYROID STIM HORMONE: CPT

## 2018-03-23 PROCEDURE — 36415 COLL VENOUS BLD VENIPUNCTURE: CPT

## 2018-03-23 PROCEDURE — 84439 ASSAY OF FREE THYROXINE: CPT

## 2018-03-23 NOTE — TELEPHONE ENCOUNTER
1. Caller Name: Lisa Arevalo                                         Call Back Number: [phone]      Patient approves a detailed voicemail message: yes    Pt called and is requesting to change her Thyroid medication to the Synthroid 50 mg as this specific mg does not have the dyes or fillers in it and Pt is currently Pregnant. Pt is no longer able to fill through OBGYN and is asking for you to fill her Thyroid medication with the change taking place. Pt states she would then have to take two and a half pills to reach her current dose but is willing to do this to avoid the dye.

## 2018-03-23 NOTE — TELEPHONE ENCOUNTER
Spoke with patient by phone to get more clarification. Her dose was changed in January and she's not had labs to recheck. Will first have her obtain thyroid labs, we can forward this result to Dr. Pinto

## 2018-03-26 ENCOUNTER — PHYSICAL THERAPY (OUTPATIENT)
Dept: PHYSICAL THERAPY | Facility: REHABILITATION | Age: 38
End: 2018-03-26
Attending: NURSE PRACTITIONER
Payer: COMMERCIAL

## 2018-03-26 DIAGNOSIS — M54.40 ACUTE BILATERAL LOW BACK PAIN WITH SCIATICA, SCIATICA LATERALITY UNSPECIFIED: ICD-10-CM

## 2018-03-26 PROCEDURE — 97110 THERAPEUTIC EXERCISES: CPT

## 2018-03-26 PROCEDURE — 97140 MANUAL THERAPY 1/> REGIONS: CPT

## 2018-03-26 NOTE — OP THERAPY DAILY TREATMENT
Outpatient Physical Therapy  DAILY TREATMENT     [facility] [address].  [address]  [place] [address]  Phone:  [phone]  Fax:  [phone]    Date: 03/26/2018    Patient: Lisa Arevalo  YOB: 1980  MRN: [id number]     Time Calculation             Chief Complaint: No chief complaint on file.    Visit #: 5    SUBJECTIVE: increased symptoms right glute and down leg since last visit.  Did well after last visit until she climbed stairs at work.  Trying to wear pregnancy SI belt when she can, has been unable to do home program secondary to pain.    OBJECTIVE:  Current objective measures:           Therapeutic Exercises (CPT 57318):     1. 90/90 positioning psoas release, 5 min     2. Single knee to chest, 1 x 15 each LE    3. Ball rolls with ta stab, 1 x 30    4. Quadraped rocking with scap retraction, 1 x 30    Therapeutic Treatments and Modalities:     1. Manual Therapy (CPT 79159), IASTM bilateral thoracolumbar fascia and lumbar paraspinals, gentle sacral float supine    Time-based treatments/modalities:          Pain rating before treatment: 4  Pain rating after treatment: 2    ASSESSMENT:   Response to treatment: Patient co increased pregnancy related symptoms lower abdominal region and presented with hypertonic right psoas -contributing to symptoms.  Instructed patient to wear SI belt lower on pelvis and only if it is helping with her symptoms.  Patient continues to demonstrate fear avoidance and high anxiety.    PLAN/RECOMMENDATIONS:   Plan for treatment: therapy treatment to continue next visit.  Planned interventions for next visit: continue with current treatment. Progress hip stability and gentle lumbar stability there ex as tolerated, review home exercises

## 2018-03-29 NOTE — TELEPHONE ENCOUNTER
Pt called back stated her OB wants you to monitor the thyroid. synthroid 50mcg tab(without artificial color) #75 please  Please call pt when compete

## 2018-03-30 RX ORDER — LEVOTHYROXINE SODIUM 50 MCG
125 TABLET ORAL
Qty: 75 TAB | Refills: 4 | Status: ON HOLD | OUTPATIENT
Start: 2018-03-30 | End: 2018-07-13

## 2018-04-02 ENCOUNTER — PHYSICAL THERAPY (OUTPATIENT)
Dept: PHYSICAL THERAPY | Facility: REHABILITATION | Age: 38
End: 2018-04-02
Attending: NURSE PRACTITIONER
Payer: COMMERCIAL

## 2018-04-02 DIAGNOSIS — M54.50 BILATERAL LOW BACK PAIN WITHOUT SCIATICA, UNSPECIFIED CHRONICITY: ICD-10-CM

## 2018-04-02 DIAGNOSIS — M54.40 ACUTE BILATERAL LOW BACK PAIN WITH SCIATICA, SCIATICA LATERALITY UNSPECIFIED: ICD-10-CM

## 2018-04-02 PROCEDURE — 97140 MANUAL THERAPY 1/> REGIONS: CPT

## 2018-04-02 PROCEDURE — 97110 THERAPEUTIC EXERCISES: CPT

## 2018-04-02 NOTE — OP THERAPY DAILY TREATMENT
Outpatient Physical Therapy  DAILY TREATMENT     [facility] Physical [address] [address]  [place] [address]  Phone:  [phone]  Fax:  [phone]    Date: 04/02/2018    Patient: Lisa Arevalo  YOB: 1980  MRN: [id number]     Time Calculation  Start time: 1300  Stop time: 1330 Time Calculation (min): 30 minutes     Chief Complaint: Low back pain   Visit #: 6    SUBJECTIVE:  Patient reports that she felt ok after last session, but continuing to have low back pain extending into R hip     OBJECTIVE:  Current objective measures:   TTP: R QL           Therapeutic Exercises (CPT 84622):     1. Stability ball , sitting anterior/pelvic tilt x 10     2. Stability ball , clockwise/counter clockwise circles on ball x 10     3. Stability ball , marching x 10 each LE     4. Modified quadruped stretch with stability ball laterally , x 6 with ten second hold each direction    5. Thoracic Rotation on stability ball, x 10 each UE       Therapeutic Exercise Summary: Discussed resources in the community for exercise classes. ([facility])     Therapeutic Treatments and Modalities:     1. Manual Therapy (CPT 54838), IASTM thoracic/lumbar spine in massage chair for comfort     2. Neuromuscular Re-education (CPT 88418), Discussed KT tape benefits and lumbar paraspinals taped to decrease sxs     Time-based treatments/modalities:  Manual therapy minutes (CPT 49097): 10 minutes  Therapeutic exercise minutes (CPT 67591): 15 minutes  Neuromusc re-ed, balance, coor, post minutes (CPT 28294): 5 minutes         ASSESSMENT:   Response to treatment: Patient tolerated treatment, and no increased sxs during session. Patient demonstrates difficulty with maintaining lumbar stabilization with bias towards anterior pelvic tilt. Patient does demonstrate fear of movement secondary to previous pain experiences.     PLAN/RECOMMENDATIONS:   Plan for treatment: therapy  treatment to continue next visit. Re-assess tape and previous treatment   Planned interventions for next visit: continue with current treatment.

## 2018-04-09 ENCOUNTER — PHYSICAL THERAPY (OUTPATIENT)
Dept: PHYSICAL THERAPY | Facility: REHABILITATION | Age: 38
End: 2018-04-09
Attending: NURSE PRACTITIONER
Payer: COMMERCIAL

## 2018-04-09 DIAGNOSIS — M54.40 ACUTE BILATERAL LOW BACK PAIN WITH SCIATICA, SCIATICA LATERALITY UNSPECIFIED: ICD-10-CM

## 2018-04-09 PROCEDURE — 97110 THERAPEUTIC EXERCISES: CPT

## 2018-04-09 PROCEDURE — 97140 MANUAL THERAPY 1/> REGIONS: CPT

## 2018-04-09 NOTE — OP THERAPY DAILY TREATMENT
Outpatient Physical Therapy  DAILY TREATMENT     [facility] Physical Therapy [address]  [address]  [place] [address]  Phone:  [phone]  Fax:  [phone]    Date: 04/09/2018    Patient: Lisa Arevalo  YOB: 1980  MRN: [id number]     Time Calculation  Start time: 0800  Stop time: 0835 Time Calculation (min): 35 minutes     Chief Complaint: No chief complaint on file.    Visit #: 7    SUBJECTIVE: walked 40 minutes pain has moved to left side; pain is variable; lots of popping up and down spine, 18 weeks into pregnancy      OBJECTIVE:  Current objective measures:           Therapeutic Exercises (CPT 61070):     1. Single knee to chest    2. Double knee to chest    3. Piriformis stretch    4. Prayer stretch with rotation    5. Stability ball march seated    Therapeutic Treatments and Modalities:     1. Manual Therapy (CPT 55739), iastm thoracic/lumbar paraspinal, right QL    Time-based treatments/modalities:  Manual therapy minutes (CPT 64451): 20 minutes  Therapeutic exercise minutes (CPT 85088): 15 minutes       Pain rating before treatment: 3  Pain rating after treatment: 0    ASSESSMENT:   Response to treatment: pain is variable and moves around thoracic spine but consistent at right L5S1 with tenderness. Bilateral trendelenberg indicating decreased pelvic stability.  Patient has high pain behavior; discussed the benefit of continuing walking and there ex despite symptoms.    PLAN/RECOMMENDATIONS:   Plan for treatment: therapy treatment to continue next visit.  Planned interventions for next visit: hip/pelvic stability thoracic mobility

## 2018-04-16 ENCOUNTER — APPOINTMENT (OUTPATIENT)
Dept: PHYSICAL THERAPY | Facility: REHABILITATION | Age: 38
End: 2018-04-16
Attending: NURSE PRACTITIONER
Payer: COMMERCIAL

## 2018-04-16 ENCOUNTER — HOSPITAL ENCOUNTER (OUTPATIENT)
Dept: LAB | Facility: MEDICAL CENTER | Age: 38
End: 2018-04-16
Attending: OBSTETRICS & GYNECOLOGY
Payer: COMMERCIAL

## 2018-04-16 PROCEDURE — 82105 ALPHA-FETOPROTEIN SERUM: CPT

## 2018-04-16 PROCEDURE — 36415 COLL VENOUS BLD VENIPUNCTURE: CPT

## 2018-04-18 LAB
# FETUSES US: NORMAL
AFP MOM SERPL: 1.9
AFP SERPL-MCNC: 116 NG/ML
AGE - REPORTED: 36 YR
CURRENT SMOKER: NO
FAMILY MEMBER DISEASES HX: NO
GA METHOD: NORMAL
GA: NORMAL WK
IDDM PATIENT QL: NO
INTEGRATED SCN PATIENT-IMP: NORMAL
SPECIMEN DRAWN SERPL: NORMAL

## 2018-04-23 ENCOUNTER — PHYSICAL THERAPY (OUTPATIENT)
Dept: PHYSICAL THERAPY | Facility: REHABILITATION | Age: 38
End: 2018-04-23
Attending: NURSE PRACTITIONER
Payer: COMMERCIAL

## 2018-04-23 DIAGNOSIS — M54.40 ACUTE BILATERAL LOW BACK PAIN WITH SCIATICA, SCIATICA LATERALITY UNSPECIFIED: ICD-10-CM

## 2018-04-23 PROCEDURE — 97110 THERAPEUTIC EXERCISES: CPT

## 2018-04-23 NOTE — OP THERAPY DAILY TREATMENT
Outpatient Physical Therapy  DAILY TREATMENT     [facility] Physical [address].  [address]  [place] [address]  Phone:  [phone]  Fax:  [phone]    Date: 04/23/2018    Patient: Lisa Arevalo  YOB: 1980  MRN: [id number]     Time Calculation  Start time: 0830  Stop time: 0905 Time Calculation (min): 35 minutes     Chief Complaint: No chief complaint on file.    Visit #: 8    SUBJECTIVE: walked 50 minutes , back pain mainly with sitting at work      OBJECTIVE:  Current objective measures:         Aquatic Exercises (CPT 41527):     1. Bridge with isometric hip external rotation, 1 x 15 15 sec    2. Quadraped alt arm leg, 1 x 10    3. Ball roll out/prayer stretch, 1x 10    4. Lateral walk with orange tband, 1 x 20    5. BRIDGET against ball on wall    6. Posture re ed lumbar support in office chair  Therapeutic Exercises (CPT 27153):     1. Single knee to chest    2. Double knee to chest    3. Piriformis stretch    4. Prayer stretch with rotation    5. Stability ball march seated    Therapeutic Treatments and Modalities:     1. Manual Therapy (CPT 49720), sacral float mob supine, right hip distraction     Time-based treatments/modalities:  Therapeutic exercise minutes (CPT 78242): 30 minutes       Pain rating before treatment: 4  Pain rating after treatment: 4    ASSESSMENT:   Response to treatment: decreased anxiety observed today, decreased pain sensitivity throughout there ex. Increased coccyx/sacral pain with hip/lumbar extension but did not limit participation.     PLAN/RECOMMENDATIONS:   Plan for treatment: therapy treatment to continue next visit.  Planned interventions for next visit: continue with current treatment.

## 2018-04-30 ENCOUNTER — TELEPHONE (OUTPATIENT)
Dept: MEDICAL GROUP | Facility: MEDICAL CENTER | Age: 38
End: 2018-04-30

## 2018-04-30 ENCOUNTER — PHYSICAL THERAPY (OUTPATIENT)
Dept: PHYSICAL THERAPY | Facility: REHABILITATION | Age: 38
End: 2018-04-30
Attending: NURSE PRACTITIONER
Payer: COMMERCIAL

## 2018-04-30 DIAGNOSIS — M54.40 ACUTE BILATERAL LOW BACK PAIN WITH SCIATICA, SCIATICA LATERALITY UNSPECIFIED: ICD-10-CM

## 2018-04-30 DIAGNOSIS — R35.0 URINARY FREQUENCY: ICD-10-CM

## 2018-04-30 DIAGNOSIS — R10.2 PELVIC PAIN: ICD-10-CM

## 2018-04-30 DIAGNOSIS — M54.50 BILATERAL LOW BACK PAIN WITHOUT SCIATICA, UNSPECIFIED CHRONICITY: ICD-10-CM

## 2018-04-30 PROCEDURE — 97110 THERAPEUTIC EXERCISES: CPT

## 2018-04-30 PROCEDURE — 97140 MANUAL THERAPY 1/> REGIONS: CPT

## 2018-04-30 NOTE — TELEPHONE ENCOUNTER
Pt states she needs a referral for pelvic floor physical therapist - for Kallie Rome ([place]) - through [facility].

## 2018-04-30 NOTE — OP THERAPY DAILY TREATMENT
Outpatient Physical Therapy  DAILY TREATMENT     [facility] [address].  [address]  [place] [address]  Phone:  [phone]  Fax:  [phone]    Date: 04/30/2018    Patient: Lisa Arevalo  YOB: 1980  MRN: [id number]     Time Calculation  Start time: 0830  Stop time: 0915 Time Calculation (min): 45 minutes     Chief Complaint: No chief complaint on file.    Visit #: 9    SUBJECTIVE: increased pelvic pain/pressure into inner thigh/genital region       OBJECTIVE:  Current objective measures:           Therapeutic Exercises (CPT 75269):     5. Stability ball march seated, 1 x 209    6. Bridge with adductor squeeze, 1 x 20 10 sec hold    7. Ball curl with adductor squeeze and ta stability, 1 x 20    8. Pelvic clocks, 1 x 10    9. BRIDGET over ball against wall, 1 x 20    Therapeutic Treatments and Modalities:     1. Manual Therapy (CPT 00953), sacral MET to correct forward sacral torsion both directions,     Time-based treatments/modalities:  Manual therapy minutes (CPT 51282): 15 minutes  Therapeutic exercise minutes (CPT 14335): 30 minutes       Pain rating before treatment: 2  Pain rating after treatment: 0    ASSESSMENT:   Response to treatment: decreased sacral pain post mobilization, decreased fear avoidance except that patient is anxious about possibly joining a prenatal yoga class secondary to back pain.  Discussed with patient the potential benefits of a community based support/exercise group.    PLAN/RECOMMENDATIONS:   Plan for treatment: therapy treatment to continue next visit.  Planned interventions for next visit: continue with current treatment.

## 2018-04-30 NOTE — TELEPHONE ENCOUNTER
Phone Number Called: [phone] (home)     Message: Pt reported increased urinary frequency/ quite a bit of pain/pressure in pelvic area / right hip pain and inner thighs  - as pt is pregnant her physical therapist suggested seeing pelvic floor pt for these symptoms.     Left Message for patient to call back: no

## 2018-05-17 ENCOUNTER — HOSPITAL ENCOUNTER (OUTPATIENT)
Dept: LAB | Facility: MEDICAL CENTER | Age: 38
End: 2018-05-17
Attending: OBSTETRICS & GYNECOLOGY
Payer: COMMERCIAL

## 2018-05-17 PROCEDURE — 82731 ASSAY OF FETAL FIBRONECTIN: CPT

## 2018-05-17 PROCEDURE — 87086 URINE CULTURE/COLONY COUNT: CPT

## 2018-05-18 LAB
AMBIGUOUS DTTM AMBI4: NORMAL
FIBRONECTIN FETAL SPEC QL: NEGATIVE

## 2018-05-20 LAB
BACTERIA UR CULT: NORMAL
SIGNIFICANT IND 70042: NORMAL
SITE SITE: NORMAL
SOURCE SOURCE: NORMAL

## 2018-05-30 ENCOUNTER — PHYSICAL THERAPY (OUTPATIENT)
Dept: PHYSICAL THERAPY | Facility: MEDICAL CENTER | Age: 38
End: 2018-05-30
Attending: NURSE PRACTITIONER
Payer: COMMERCIAL

## 2018-05-30 DIAGNOSIS — R35.0 URINARY FREQUENCY: ICD-10-CM

## 2018-05-30 DIAGNOSIS — R10.2 PELVIC PAIN: ICD-10-CM

## 2018-05-30 PROCEDURE — 97110 THERAPEUTIC EXERCISES: CPT

## 2018-05-30 PROCEDURE — 97163 PT EVAL HIGH COMPLEX 45 MIN: CPT

## 2018-05-31 NOTE — OP THERAPY EVALUATION
Outpatient Physical Therapy  PELVIC FLOOR INITIAL EVALUATION    [facility] Outpatient Physical Therapy  [address]  [place] [address]  Phone:  [phone]  Fax:  [phone]    Date of Evaluation: 05/30/2018    Patient: Lisa Arevalo  YOB: 1980  MRN: [id number]     Referring Provider: MASOOD Moody  [address]  [address]  [place] [address]   Referring Diagnosis Urinary frequency [R35.0];Pelvic pain [R10.2]     Time Calculation  Start time: 1140  Stop time: 1240 Time Calculation (min): 60 minutes     Physical Therapy Occurrence Codes    Date of onset of impairment:  4/30/18   Date physical therapy care plan established or reviewed:  5/30/18   Date physical therapy treatment started:  5/30/18          Chief Complaint: Back Problem and Pelvic Pain    Visit Diagnoses     ICD-10-CM   1. Pelvic pain R10.2   2. Urinary frequency R35.0       Subjective:   History of Present Illness/Problem:     Other problems:  Low back pain, leg pain, rectal pain, vaginal pain, hip pain    Description of problem:  Lisa is 25 weeks pregnant with a due date of Sept 7th. Lisa reports a low back injury while working out in July 2017 and then an exacerbation of her injury in October 2017. A few days later she felt numbness down her legs and burning in the bottom of both of her feet. She avoids bending over. She had imaging of her spine which she states was unremarkable but she has continued to have these symptoms. She became pregnant in November and she has not been able to have nerve studies done as planned. Since her pregnancy her symptoms have become worse. She also reports L leg and L big toe weakness. She states she also has interstitial cystitis.     Problem progression:  Getting worse    Aggravating factors:  Vigorous activity/exercise, sitting, standing, walking, lifting or bending and changing position    Alleviating factors:  Rest    Other previous  treatment(s) tried:  Physical therapy, chiropractor  General Health:     General health:  Good    Occupation:  Sitting at a desk    Additional general health details:  Per chart review pt has history of Graves Disease and Fibromyalgia  Patient Goals:     Patient goals for treatment:  Pain relief and strengthening      Past Medical History:   Diagnosis Date   • Arrhythmia     palpitations   • Cold    • Fibromyalgia    • Heart burn    • Hyperthyroidism 2010    status post thyroidectomy   • Hypothyroidism, postsurgical 2010   • Indigestion    • Interstitial cystitis    • Migraine    • Renal disorder     intertstital cystitis     Past Surgical History:   Procedure Laterality Date   • THYROIDECTOMY TOTAL  2010    Performed by JUSTIN CLAYTON at SURGERY SAME DAY [facility] ORS   • TONSILLECTOMY AND ADENOIDECTOMY     • MASTOIDECTOMY     • MYRINGOTOMY     • OTHER      placement of Anne Cath times 2   • OTHER ORTHOPEDIC SURGERY      broken left arm     OB History    Para Term  AB Living   2             SAB TAB Ectopic Molar Multiple Live Births                    # Outcome Date GA Lbr Mikal/2nd Weight Sex Delivery Anes PTL Lv   2 Current            1                  Social History   Substance Use Topics   • Smoking status: Never Smoker   • Smokeless tobacco: Never Used   • Alcohol use 0.0 oz/week      Comment: rare     Family and Occupational History     Social History   • Marital status:      Spouse name: N/A   • Number of children: N/A   • Years of education: N/A         Objective     Pelvic floor exam   Pelvic floor exam additional details: Pelvic floor muscle exam deferred until PT obtains clearance from Lisa's OB     Lumbar spine assessment completed this session:  Increased lumbar lordosis in standing  Increased lumbar lordosis with overcorrection in sitting   Slouch position increased pt symptoms  Sitting with lumbar support decreased symptoms    Lumbar AROM   Flexion 75% loss  due to pregnancy and pt hesitant to move into flexion due fear of pain  Flexion 75% loss  R side glide 25% loss    Neuro exam  L hip flexion 3/5  L ankle DF 3+/5  L great toe 3+/5    L lumbar lateral shift    R SGIS x 10 decrease/better          Pelvic Floor Distress Inventory (PFDI-20)    Pelvic Organ Prolapse Distress Inventory 6 (POPDI-6)    Scale of bother:       Symptoms not present = 0       Bothered: not at all = 1; somewhat = 2; moderately = 3; quite a bit = 4    Does patient...  Usually experience pressure in the lower abdomen?         Score = 4  Usually experience heaviness or dullness in the pelvic area?         Score = 4  Usually have a bulge or something falling out that you can see or feel in the vaginal area?        Score = 0  Ever have to push on the vagina or around  the rectum to have or complete a bowel movement.         Score = 0  Usually experience a feeling of incomplete bladder emptying?       Score = 3  Ever have to push up on a bulge in the vaginal area with your fingers to start or complete urination?       Score = 0  POPDI-6 Scale Score = 45.83    Colorectal-Anal Distress Inventory 8 (CRAD-8)    Scale of bother:       Symptoms not present = 0       Bothered: not at all = 1; somewhat = 2; moderately = 3; quite a bit = 4    Does patient...  Feel a need to strain too hard to have a bowel movement?       Score = 2  Feel she has not completely emptied her bowels at the end of a bowel movement?       Score = 2  Usually lose stool beyond her control if her stool is well formed?       Score = 0  Usually lose stool beyond her control if her stool is loose?       Score = 0  Usually lose gas from the rectum beyond her control?       Score = 0  Usually has pain when she pass her stool?       Score = 0  Experience a strong sense of urgency and have to rush to the bathroom to have a bowel movement?       Score = 0  Does part of her bowel ever pass through the rectum and bulge outside during or after a  bowel movement?       Score = 0  CRAD-8 Score = 12.5    Urinary Distress Inventory 6 (JUANI-6)    Scale of bother:       Symptoms not present = 0       Bothered: not at all = 1; somewhat = 2; moderately = 3; quite a bit = 4    Does patient...  Usually experience frequent urination?       Score = 3    Usually experience urine leakage associated with a feeling of urgency, that is, a strong sensation of needing to go to the bathroom?       Score = 0  Usually experience urine leakage related to coughing, sneezing, or laughing?       Score = 2  Usually experience small amounts of urine leakage (that is, drops)?       Score = 0  Usually experience difficulty emptying your bladder?       Score = 2  Usually experience pain or discomfort in the lower abdomen or genital region?       Score = 4  JUANI-6 Score = 45.83    PFDI-20 Summary Score = 104.17    Exercises and Treatments   Therapeutic exercises     sitting with lumbar support    body positioning with pregnancy pillow    R SGIS x 10    Time-based treatments/modalities:  Therapeutic exercise minutes (CPT 45781): 15 minutes       Assessment, Goals and Plan   Assessment:     Assessment details:  Lisa is a 38 yo female with c/o low back pain, pelvic pain, hip pain, B leg and foot numbness. She is currently 25 weeks pregnant. She demonstrates lumbar spine derangement with L lateral shift contributing to her symptoms. She demonstrated directional preference of R SGIS. Pelvic floor muscle exam is deferred until PT receives clearance from Dr. Pinto her OB/GYN.    Goals:   Functional Goals     Problem: decreased ability for advancing ADL's due to leakage/pain    Problem: limited social activities due to urinary incontinence or pain    Problem: decreased walking distance    Problem: decreased sitting ability    Problem: decreased standing ability    Functional Goals Comments:  Long term goals x 12 weeks   1. Pt is able to walk for > 30 minutes without increased  symptoms  2. Pt  is able to sit for > I hour for work without increased  symptoms  3. Pt is able to perform advanced ADLs without increased symptoms    Short term goals x 4 weeks  1. Pt is to be independent with HEP for symptom management  2. Pt is to report 50% decrease in overall pain level  3. Perform pelvic floor muscle exam as needed    Treatment Plan:     Planned therapy interventions:  Manual therapy (CPT 72519), neuromuscular re-education (CPT 20546) and therapeutic exercise (CPT 10738)    Frequency of visits:  2x week    Duration in weeks:  12 weeks    Discussed with:  Patient      Functional Limitation G-Codes and Severity Modifiers      Current:     Goal:         Referring provider co-signature:  I have reviewed this plan of care and my co-signature certifies the need for services.  Certification Dates:   From 5/30/18     To 8/22/18    Physician Signature: ________________________________ Date: ______________

## 2018-06-04 ENCOUNTER — TELEPHONE (OUTPATIENT)
Dept: MEDICAL GROUP | Facility: MEDICAL CENTER | Age: 38
End: 2018-06-04

## 2018-06-04 ENCOUNTER — PHYSICAL THERAPY (OUTPATIENT)
Dept: PHYSICAL THERAPY | Facility: MEDICAL CENTER | Age: 38
End: 2018-06-04
Attending: NURSE PRACTITIONER
Payer: COMMERCIAL

## 2018-06-04 DIAGNOSIS — E55.9 VITAMIN D DEFICIENCY: ICD-10-CM

## 2018-06-04 DIAGNOSIS — R35.0 URINARY FREQUENCY: ICD-10-CM

## 2018-06-04 DIAGNOSIS — R10.2 PELVIC PAIN: ICD-10-CM

## 2018-06-04 DIAGNOSIS — M54.50 BILATERAL LOW BACK PAIN WITHOUT SCIATICA, UNSPECIFIED CHRONICITY: ICD-10-CM

## 2018-06-04 DIAGNOSIS — E03.9 ACQUIRED HYPOTHYROIDISM: ICD-10-CM

## 2018-06-04 PROCEDURE — 97110 THERAPEUTIC EXERCISES: CPT

## 2018-06-04 NOTE — OP THERAPY DAILY TREATMENT
Outpatient Physical Therapy  PELVIC FLOOR DAILY TREATMENT     [facility] Outpatient Physical Therapy  [address]  [place] [address]  Phone:  [phone]  Fax:  [phone]    Date: 06/04/2018    Patient: Lisa Arevalo  YOB: 1980  MRN: [id number]     Time Calculation  Start time: 1100  Stop time: 1145 Time Calculation (min): 45 minutes     Chief Complaint: Back Problem    Visit #: 10    Subjective She felt 75% better on Saturday and she has been consistent with her side glides. She felt sore on Sunday because she probably did too much on Saturday because she felt so good. She no longer has the rectal or labia pain and her pain is mostly in the center of her low back.       Objective     Exercises and Treatments   Therapeutic exercises     L SGIS x 10    BRIDGET against table x 10    gait in clinic    BRIDGET without table x 10    gait in clinic  Therapeutic exercises summary: Explained MDT theory, centralization, pt ed on lumbar and sacral nerve referral patterns    Time-based treatments/modalities:  Therapeutic exercise minutes (CPT 33678): 45 minutes             Assessment, Goals and Plan   Assessment:     Assessment details:  Lisa is responding well and her R lateral shift has been corrected and she did well with lumbar extension and this was added to her HEP.    Treatment Plan:     Planned therapy interventions:  Manual therapy (CPT 04387), neuromuscular re-education (CPT 52647) and therapeutic exercise (CPT 23725)    Plan details:  Continue PT

## 2018-06-06 ENCOUNTER — PHYSICAL THERAPY (OUTPATIENT)
Dept: PHYSICAL THERAPY | Facility: MEDICAL CENTER | Age: 38
End: 2018-06-06
Attending: NURSE PRACTITIONER
Payer: COMMERCIAL

## 2018-06-06 DIAGNOSIS — M54.40 ACUTE BILATERAL LOW BACK PAIN WITH SCIATICA, SCIATICA LATERALITY UNSPECIFIED: ICD-10-CM

## 2018-06-06 DIAGNOSIS — R35.0 URINARY FREQUENCY: ICD-10-CM

## 2018-06-06 DIAGNOSIS — M54.41 ACUTE BILATERAL LOW BACK PAIN WITH BILATERAL SCIATICA: ICD-10-CM

## 2018-06-06 DIAGNOSIS — M54.42 ACUTE BILATERAL LOW BACK PAIN WITH BILATERAL SCIATICA: ICD-10-CM

## 2018-06-06 DIAGNOSIS — R10.2 PELVIC PAIN: ICD-10-CM

## 2018-06-06 PROCEDURE — 97140 MANUAL THERAPY 1/> REGIONS: CPT

## 2018-06-06 NOTE — OP THERAPY DAILY TREATMENT
Outpatient Physical Therapy  PELVIC FLOOR DAILY TREATMENT     [facility] Outpatient Physical Therapy  [address]  [place] [address]  Phone:  [phone]  Fax:  [phone]    Date: 06/06/2018    Patient: Lisa Arevalo  YOB: 1980  MRN: [id number]     Time Calculation  Start time: 1045  Stop time: 1115 Time Calculation (min): 30 minutes     Chief Complaint: Back Problem    Visit #: 11    Subjective her low back has been more sore, she felt more relief the past weekend with the side glides and she feels crooked today.      Objective     Exercises and Treatments   Therapeutic exercises   Therapeutic exercises summary: L lateral shift  R SGIS x 10 reps x 3 sets   Gait in clinic between sets     Time-based treatments/modalities:  Manual therapy minutes (CPT 67082): 30 minutes             Assessment, Goals and Plan   Assessment:     Assessment details:  Lisa demonstrated a L lateral shift this session which was 90% corrected with R SGIS and she reported decreased pain after Rx.     Treatment Plan:     Planned therapy interventions:  Manual therapy (CPT 15542) and neuromuscular re-education (CPT 04475)    Plan details:  Continue PT

## 2018-06-08 ENCOUNTER — HOSPITAL ENCOUNTER (OUTPATIENT)
Dept: LAB | Facility: MEDICAL CENTER | Age: 38
End: 2018-06-08
Attending: OBSTETRICS & GYNECOLOGY
Payer: COMMERCIAL

## 2018-06-08 ENCOUNTER — HOSPITAL ENCOUNTER (OUTPATIENT)
Dept: LAB | Facility: MEDICAL CENTER | Age: 38
End: 2018-06-08
Attending: NURSE PRACTITIONER
Payer: COMMERCIAL

## 2018-06-08 DIAGNOSIS — E55.9 VITAMIN D DEFICIENCY: ICD-10-CM

## 2018-06-08 LAB
25(OH)D3 SERPL-MCNC: 34 NG/ML (ref 30–100)
APPEARANCE UR: CLEAR
BACTERIA #/AREA URNS HPF: NEGATIVE /HPF
BASOPHILS # BLD AUTO: 0.6 % (ref 0–1.8)
BASOPHILS # BLD: 0.04 K/UL (ref 0–0.12)
BILIRUB UR QL STRIP.AUTO: NEGATIVE
BLD GP AB SCN SERPL QL: NORMAL
COLOR UR: YELLOW
EOSINOPHIL # BLD AUTO: 0.05 K/UL (ref 0–0.51)
EOSINOPHIL NFR BLD: 0.7 % (ref 0–6.9)
EPI CELLS #/AREA URNS HPF: ABNORMAL /HPF
ERYTHROCYTE [DISTWIDTH] IN BLOOD BY AUTOMATED COUNT: 46.3 FL (ref 35.9–50)
GLUCOSE 1H P 50 G GLC PO SERPL-MCNC: 119 MG/DL (ref 70–139)
GLUCOSE UR STRIP.AUTO-MCNC: 100 MG/DL
HCT VFR BLD AUTO: 39.3 % (ref 37–47)
HGB BLD-MCNC: 13.4 G/DL (ref 12–16)
HYALINE CASTS #/AREA URNS LPF: ABNORMAL /LPF
IMM GRANULOCYTES # BLD AUTO: 0.04 K/UL (ref 0–0.11)
IMM GRANULOCYTES NFR BLD AUTO: 0.6 % (ref 0–0.9)
KETONES UR STRIP.AUTO-MCNC: NEGATIVE MG/DL
LEUKOCYTE ESTERASE UR QL STRIP.AUTO: ABNORMAL
LYMPHOCYTES # BLD AUTO: 1.27 K/UL (ref 1–4.8)
LYMPHOCYTES NFR BLD: 18.7 % (ref 22–41)
MCH RBC QN AUTO: 34.5 PG (ref 27–33)
MCHC RBC AUTO-ENTMCNC: 34.1 G/DL (ref 33.6–35)
MCV RBC AUTO: 101.3 FL (ref 81.4–97.8)
MICRO URNS: ABNORMAL
MONOCYTES # BLD AUTO: 0.35 K/UL (ref 0–0.85)
MONOCYTES NFR BLD AUTO: 5.2 % (ref 0–13.4)
NEUTROPHILS # BLD AUTO: 5.04 K/UL (ref 2–7.15)
NEUTROPHILS NFR BLD: 74.2 % (ref 44–72)
NITRITE UR QL STRIP.AUTO: NEGATIVE
NRBC # BLD AUTO: 0 K/UL
NRBC BLD-RTO: 0 /100 WBC
PH UR STRIP.AUTO: 7 [PH]
PLATELET # BLD AUTO: 162 K/UL (ref 164–446)
PMV BLD AUTO: 11 FL (ref 9–12.9)
PROT UR QL STRIP: NEGATIVE MG/DL
RBC # BLD AUTO: 3.88 M/UL (ref 4.2–5.4)
RBC # URNS HPF: ABNORMAL /HPF
RBC UR QL AUTO: NEGATIVE
SP GR UR STRIP.AUTO: 1.01
T4 FREE SERPL-MCNC: 0.91 NG/DL (ref 0.53–1.43)
TSH SERPL DL<=0.005 MIU/L-ACNC: 0.62 UIU/ML (ref 0.38–5.33)
UROBILINOGEN UR STRIP.AUTO-MCNC: 0.2 MG/DL
WBC # BLD AUTO: 6.8 K/UL (ref 4.8–10.8)
WBC #/AREA URNS HPF: ABNORMAL /HPF

## 2018-06-08 PROCEDURE — 82306 VITAMIN D 25 HYDROXY: CPT

## 2018-06-08 PROCEDURE — 82950 GLUCOSE TEST: CPT

## 2018-06-08 PROCEDURE — 84443 ASSAY THYROID STIM HORMONE: CPT

## 2018-06-08 PROCEDURE — 36415 COLL VENOUS BLD VENIPUNCTURE: CPT

## 2018-06-08 PROCEDURE — 84439 ASSAY OF FREE THYROXINE: CPT

## 2018-06-08 PROCEDURE — 86850 RBC ANTIBODY SCREEN: CPT

## 2018-06-08 PROCEDURE — 85025 COMPLETE CBC W/AUTO DIFF WBC: CPT

## 2018-06-08 PROCEDURE — 81001 URINALYSIS AUTO W/SCOPE: CPT

## 2018-06-11 ENCOUNTER — APPOINTMENT (OUTPATIENT)
Dept: PHYSICAL THERAPY | Facility: MEDICAL CENTER | Age: 38
End: 2018-06-11
Attending: NURSE PRACTITIONER
Payer: COMMERCIAL

## 2018-06-12 ENCOUNTER — PHYSICAL THERAPY (OUTPATIENT)
Dept: PHYSICAL THERAPY | Facility: MEDICAL CENTER | Age: 38
End: 2018-06-12
Attending: NURSE PRACTITIONER
Payer: COMMERCIAL

## 2018-06-12 DIAGNOSIS — M54.41 ACUTE BILATERAL LOW BACK PAIN WITH BILATERAL SCIATICA: ICD-10-CM

## 2018-06-12 DIAGNOSIS — R35.0 URINARY FREQUENCY: ICD-10-CM

## 2018-06-12 DIAGNOSIS — M54.42 ACUTE BILATERAL LOW BACK PAIN WITH BILATERAL SCIATICA: ICD-10-CM

## 2018-06-12 DIAGNOSIS — M54.40 ACUTE BILATERAL LOW BACK PAIN WITH SCIATICA, SCIATICA LATERALITY UNSPECIFIED: ICD-10-CM

## 2018-06-12 DIAGNOSIS — R10.2 PELVIC PAIN: ICD-10-CM

## 2018-06-12 DIAGNOSIS — M54.50 BILATERAL LOW BACK PAIN WITHOUT SCIATICA, UNSPECIFIED CHRONICITY: ICD-10-CM

## 2018-06-12 PROCEDURE — 97140 MANUAL THERAPY 1/> REGIONS: CPT

## 2018-06-12 PROCEDURE — 97110 THERAPEUTIC EXERCISES: CPT

## 2018-06-13 ENCOUNTER — APPOINTMENT (OUTPATIENT)
Dept: PHYSICAL THERAPY | Facility: MEDICAL CENTER | Age: 38
End: 2018-06-13
Attending: NURSE PRACTITIONER
Payer: COMMERCIAL

## 2018-06-13 NOTE — OP THERAPY DAILY TREATMENT
Outpatient Physical Therapy  PELVIC FLOOR DAILY TREATMENT     [facility] Outpatient Physical Therapy  [address]  [place] [address]  Phone:  [phone]  Fax:  [phone]    Date: 06/12/2018    Patient: Lisa Arevalo  YOB: 1980  MRN: [id number]     Time Calculation  Start time: 0320  Stop time: 0405 Time Calculation (min): 45 minutes     Chief Complaint: Back Problem and Pelvic Pain    Visit #: 12    Subjective the side glides helped for a few days and then they began to irritate the tailbone. She did not do them over the weekend but did them every 2 hours yesterday and her tailbone and L labia were sore.       Objective     Exercises and Treatments   Therapeutic exercises     BRIDGET x 5 x 2 sets, NE    gait in clinic    RFIS x 5 reps , NE    BRIDGET x 5 reps, decreased/better  Therapeutic treatments and modalities     Manual therapy external, CTM sacrum and lumbar  Therapeutic treatment and modalities summary: Pt ed on self tissue mobilization with ball on a wall.     Time-based treatments/modalities:  Manual therapy minutes (CPT 19782): 20 minutes  Therapeutic exercise minutes (CPT 50873): 25 minutes             Assessment, Goals and Plan   Assessment:     Assessment details:  Lisa responded well to lumbar extension and lateral shift is corrected. She reported bladder symptoms with ball on the wall to lumbar paraspinals.    Treatment Plan:     Planned therapy interventions:  Manual therapy (CPT 53056), neuromuscular re-education (CPT 31508) and therapeutic exercise (CPT 75401)    Plan details:  Continue PT

## 2018-06-14 ENCOUNTER — PHYSICAL THERAPY (OUTPATIENT)
Dept: PHYSICAL THERAPY | Facility: MEDICAL CENTER | Age: 38
End: 2018-06-14
Attending: NURSE PRACTITIONER
Payer: COMMERCIAL

## 2018-06-14 DIAGNOSIS — R35.0 URINARY FREQUENCY: ICD-10-CM

## 2018-06-14 DIAGNOSIS — M54.50 BILATERAL LOW BACK PAIN WITHOUT SCIATICA, UNSPECIFIED CHRONICITY: ICD-10-CM

## 2018-06-14 DIAGNOSIS — M54.40 ACUTE BILATERAL LOW BACK PAIN WITH SCIATICA, SCIATICA LATERALITY UNSPECIFIED: ICD-10-CM

## 2018-06-14 DIAGNOSIS — M54.42 ACUTE BILATERAL LOW BACK PAIN WITH BILATERAL SCIATICA: ICD-10-CM

## 2018-06-14 DIAGNOSIS — R10.2 PELVIC PAIN: ICD-10-CM

## 2018-06-14 DIAGNOSIS — M54.41 ACUTE BILATERAL LOW BACK PAIN WITH BILATERAL SCIATICA: ICD-10-CM

## 2018-06-14 PROCEDURE — 97110 THERAPEUTIC EXERCISES: CPT

## 2018-06-15 NOTE — OP THERAPY DAILY TREATMENT
Outpatient Physical Therapy  PELVIC FLOOR DAILY TREATMENT     [facility] Outpatient Physical Therapy  [address]  [place] [address]  Phone:  [phone]  Fax:  [phone]    Date: 2018    Patient: Lisa Arevalo  YOB: 1980  MRN: [id number]     Time Calculation  Start time: 330  Stop time: 415 Time Calculation (min): 45 minutes     Chief Complaint: Back Problem and Pelvic Pain    Visit #: 13    Subjective she is spreading out the back bends and she is still more sore in the central low back and is  having pain referral to the lower abdomen and labia. She is worried about having a vaginal delivery with all of her nerve symptoms. She states she does feel like she wants to walk more and work out and it has not flared up her symptoms.       Objective     Exercises and Treatments   Therapeutic exercises     BRIDGET against table x 5 reps    deep squat for PFM relaxation with support on table     supine with legs elevated and resting on a chair only if tolerable   Therapeutic exercises summary: Discussed at length with Lisa current research regarding pelvic pain syndromes and vaginal delivery vs. , perineal massage     Also discussed managing activities and symptoms on a daily basis depending on her body's threshold    Time-based treatments/modalities:  Therapeutic exercise minutes (CPT 32279): 45 minutes             Assessment, Goals and Plan   Assessment:     Assessment details:  Lisa reported relief with the pelvic drop positions and she continues to respond well to lumbar extension. I discussed with Lisa at length today her history of IC and chronic pelvic pain and how it may relate to her current symptoms during pregnancy.     Treatment Plan:     Planned therapy interventions:  Neuromuscular re-education (CPT 32426) and therapeutic exercise (CPT 02962)    Frequency of visits:  1x week    Plan details:  Continue PT

## 2018-06-20 ENCOUNTER — PHYSICAL THERAPY (OUTPATIENT)
Dept: PHYSICAL THERAPY | Facility: MEDICAL CENTER | Age: 38
End: 2018-06-20
Attending: NURSE PRACTITIONER
Payer: COMMERCIAL

## 2018-06-20 DIAGNOSIS — R35.0 URINARY FREQUENCY: ICD-10-CM

## 2018-06-20 DIAGNOSIS — R10.2 PELVIC PAIN: ICD-10-CM

## 2018-06-20 DIAGNOSIS — M54.50 BILATERAL LOW BACK PAIN WITHOUT SCIATICA, UNSPECIFIED CHRONICITY: ICD-10-CM

## 2018-06-20 DIAGNOSIS — M54.41 ACUTE BILATERAL LOW BACK PAIN WITH BILATERAL SCIATICA: ICD-10-CM

## 2018-06-20 DIAGNOSIS — M54.42 ACUTE BILATERAL LOW BACK PAIN WITH BILATERAL SCIATICA: ICD-10-CM

## 2018-06-20 DIAGNOSIS — M54.40 ACUTE BILATERAL LOW BACK PAIN WITH SCIATICA, SCIATICA LATERALITY UNSPECIFIED: ICD-10-CM

## 2018-06-20 PROCEDURE — 97110 THERAPEUTIC EXERCISES: CPT

## 2018-06-20 NOTE — OP THERAPY DAILY TREATMENT
Outpatient Physical Therapy  PELVIC FLOOR DAILY TREATMENT     [facility] Outpatient Physical Therapy  [address]  [place] [address]  Phone:  [phone]  Fax:  [phone]    Date: 06/20/2018    Patient: Lisa Arevalo  YOB: 1980  MRN: [id number]     Time Calculation  Start time: 1045  Stop time: 1125 Time Calculation (min): 40 minutes     Chief Complaint: Pelvic Pain and Back Problem    Visit #: 14    Subjective Her symptoms have been stable since the last session. She is doing the back bends every 4 hours, 5 reps and the deep squats several times a day as needed.       Objective     Exercises and Treatments   Therapeutic exercises     seated PFM contraction x 5 sec hold/10 sec rest x 5 reps    same as above with ball squeeze x 5 reps, added to HEP followed by deep squat    same as above and added scapular retraction x 5 reps    deep squat for PFM relaxation     Time-based treatments/modalities:  Therapeutic exercise minutes (CPT 56709): 40 minutes             Assessment, Goals and Plan   Assessment:     Assessment details:  Lisa responded well to progression of program and reported decreased low back pain and pelvic pain/pressure after Rx.     Treatment Plan:     Planned therapy interventions:  Manual therapy (CPT 90914), neuromuscular re-education (CPT 34732) and therapeutic exercise (CPT 21190)    Plan details:  Continue PT

## 2018-06-25 ENCOUNTER — PHYSICAL THERAPY (OUTPATIENT)
Dept: PHYSICAL THERAPY | Facility: MEDICAL CENTER | Age: 38
End: 2018-06-25
Attending: NURSE PRACTITIONER
Payer: COMMERCIAL

## 2018-06-25 DIAGNOSIS — M54.42 ACUTE BILATERAL LOW BACK PAIN WITH BILATERAL SCIATICA: ICD-10-CM

## 2018-06-25 DIAGNOSIS — M54.50 BILATERAL LOW BACK PAIN WITHOUT SCIATICA, UNSPECIFIED CHRONICITY: ICD-10-CM

## 2018-06-25 DIAGNOSIS — M54.40 ACUTE BILATERAL LOW BACK PAIN WITH SCIATICA, SCIATICA LATERALITY UNSPECIFIED: ICD-10-CM

## 2018-06-25 DIAGNOSIS — R35.0 URINARY FREQUENCY: ICD-10-CM

## 2018-06-25 DIAGNOSIS — R10.2 PELVIC PAIN: ICD-10-CM

## 2018-06-25 DIAGNOSIS — M54.41 ACUTE BILATERAL LOW BACK PAIN WITH BILATERAL SCIATICA: ICD-10-CM

## 2018-06-25 PROCEDURE — 97140 MANUAL THERAPY 1/> REGIONS: CPT

## 2018-06-25 PROCEDURE — 97110 THERAPEUTIC EXERCISES: CPT

## 2018-06-25 NOTE — OP THERAPY DAILY TREATMENT
Outpatient Physical Therapy  PELVIC FLOOR DAILY TREATMENT     [facility] Outpatient Physical Therapy  [address]  [place] [address]  Phone:  [phone]  Fax:  [phone]    Date: 06/25/2018    Patient: Lisa Arevalo  YOB: 1980  MRN: [id number]     Time Calculation  Start time: 1015  Stop time: 1100 Time Calculation (min): 45 minutes     Chief Complaint: Back Problem and Pelvic Pain    Visit #: 15    Subjective her low back pain and pelvic pain were flared up by the end of the day after the last session and then she had 5 days of increased pain. The iced the low back a lot and kept up with the back bends.       Objective   Informed consent for internal examination given      Exercises and Treatments   Therapeutic treatments and modalities     Manual therapy internal, TRP levator ani, introitus  Therapeutic treatment and modalities summary: Pt ed on use of vaginal dilators and provided pt with purchase information.   Pt ed on self tissue mobilization at the perineum and transverse perineal muscle    Time-based treatments/modalities:  Manual therapy minutes (CPT 12344): 25 minutes  Therapeutic exercise minutes (CPT 27360): 20 minutes             Assessment, Goals and Plan   Assessment:     Assessment details:  Suspect addition of PFM contractions contributed to pt flare up of her pelvic pain related to IC. She tolerated Rx this session and she demonstrates moderate overactivity of the levator ani muscles and superficial PFM contributing to her pelvic pain. Pt would most likely benefit from self tissue mobilization and use of dilators to help reduce pain and prepare her for labor and delivery.     Treatment Plan:     Planned therapy interventions:  Manual therapy (CPT 26280), neuromuscular re-education (CPT 95900) and therapeutic exercise (CPT 52787)    Plan details:  Continue PT

## 2018-06-27 ENCOUNTER — PHYSICAL THERAPY (OUTPATIENT)
Dept: PHYSICAL THERAPY | Facility: MEDICAL CENTER | Age: 38
End: 2018-06-27
Attending: NURSE PRACTITIONER
Payer: COMMERCIAL

## 2018-06-27 DIAGNOSIS — M54.41 ACUTE BILATERAL LOW BACK PAIN WITH BILATERAL SCIATICA: ICD-10-CM

## 2018-06-27 DIAGNOSIS — M54.42 ACUTE BILATERAL LOW BACK PAIN WITH BILATERAL SCIATICA: ICD-10-CM

## 2018-06-27 DIAGNOSIS — M54.40 ACUTE BILATERAL LOW BACK PAIN WITH SCIATICA, SCIATICA LATERALITY UNSPECIFIED: ICD-10-CM

## 2018-06-27 DIAGNOSIS — M54.50 BILATERAL LOW BACK PAIN WITHOUT SCIATICA, UNSPECIFIED CHRONICITY: ICD-10-CM

## 2018-06-27 DIAGNOSIS — R35.0 URINARY FREQUENCY: ICD-10-CM

## 2018-06-27 DIAGNOSIS — R10.2 PELVIC PAIN: ICD-10-CM

## 2018-06-27 PROCEDURE — 97140 MANUAL THERAPY 1/> REGIONS: CPT

## 2018-06-27 NOTE — OP THERAPY DAILY TREATMENT
Outpatient Physical Therapy  PELVIC FLOOR DAILY TREATMENT     [facility] Outpatient Physical Therapy  [address]  [place] [address]  Phone:  [phone]  Fax:  [phone]    Date: 06/27/2018    Patient: Lisa Arevalo  YOB: 1980  MRN: [id number]     Time Calculation  Start time: 0745  Stop time: 0825 Time Calculation (min): 40 minutes     Chief Complaint: Pelvic Pain and Back Problem    Visit #: 16    Subjective she is feeling much better today and the back bends are going well. She did some tissue work vaginally and did about 20 sweeps along the entrance and felt a little irritation. She did order the dilators.       Objective   Informed consent for internal examination given      Exercises and Treatments   Therapeutic treatments and modalities     Manual therapy internal, TrP levator ani, introitus    Time-based treatments/modalities:  Manual therapy minutes (CPT 77838): 40 minutes             Assessment, Goals and Plan   Assessment:     Assessment details:  Good improvement in tissue quality and mobility of the PFM. She is to bring in the dilators next session for instruction.     Treatment Plan:     Planned therapy interventions:  Manual therapy (CPT 39172) and neuromuscular re-education (CPT 03193)    Plan details:  Continue PT

## 2018-07-02 ENCOUNTER — APPOINTMENT (OUTPATIENT)
Dept: PHYSICAL THERAPY | Facility: REHABILITATION | Age: 38
End: 2018-07-02
Attending: NURSE PRACTITIONER
Payer: COMMERCIAL

## 2018-07-05 ENCOUNTER — APPOINTMENT (OUTPATIENT)
Dept: PHYSICAL THERAPY | Facility: REHABILITATION | Age: 38
End: 2018-07-05
Attending: NURSE PRACTITIONER
Payer: COMMERCIAL

## 2018-07-09 ENCOUNTER — APPOINTMENT (OUTPATIENT)
Dept: PHYSICAL THERAPY | Facility: REHABILITATION | Age: 38
End: 2018-07-09
Attending: NURSE PRACTITIONER
Payer: COMMERCIAL

## 2018-07-10 ENCOUNTER — HOSPITAL ENCOUNTER (INPATIENT)
Facility: MEDICAL CENTER | Age: 38
LOS: 3 days | DRG: 778 | End: 2018-07-13
Attending: OBSTETRICS & GYNECOLOGY | Admitting: OBSTETRICS & GYNECOLOGY
Payer: COMMERCIAL

## 2018-07-10 LAB
APPEARANCE UR: CLEAR
BACTERIA #/AREA URNS HPF: ABNORMAL /HPF
BASOPHILS # BLD AUTO: 0.4 % (ref 0–1.8)
BASOPHILS # BLD: 0.04 K/UL (ref 0–0.12)
BILIRUB UR QL STRIP.AUTO: NEGATIVE
CAOX CRY #/AREA URNS HPF: ABNORMAL /HPF
COLOR UR: YELLOW
EOSINOPHIL # BLD AUTO: 0.02 K/UL (ref 0–0.51)
EOSINOPHIL NFR BLD: 0.2 % (ref 0–6.9)
EPI CELLS #/AREA URNS HPF: ABNORMAL /HPF
ERYTHROCYTE [DISTWIDTH] IN BLOOD BY AUTOMATED COUNT: 42.3 FL (ref 35.9–50)
ERYTHROCYTE [DISTWIDTH] IN BLOOD BY AUTOMATED COUNT: 43.5 FL (ref 35.9–50)
FIBRONECTIN FETAL SPEC QL: NEGATIVE
GLUCOSE UR STRIP.AUTO-MCNC: NEGATIVE MG/DL
HCT VFR BLD AUTO: 40.2 % (ref 37–47)
HCT VFR BLD AUTO: 40.4 % (ref 37–47)
HGB BLD-MCNC: 13.7 G/DL (ref 12–16)
HGB BLD-MCNC: 13.9 G/DL (ref 12–16)
HOLDING TUBE BB 8507: NORMAL
HYALINE CASTS #/AREA URNS LPF: ABNORMAL /LPF
IMM GRANULOCYTES # BLD AUTO: 0.09 K/UL (ref 0–0.11)
IMM GRANULOCYTES NFR BLD AUTO: 0.9 % (ref 0–0.9)
KETONES UR STRIP.AUTO-MCNC: 80 MG/DL
LEUKOCYTE ESTERASE UR QL STRIP.AUTO: ABNORMAL
LYMPHOCYTES # BLD AUTO: 1.33 K/UL (ref 1–4.8)
LYMPHOCYTES NFR BLD: 13.7 % (ref 22–41)
MAGNESIUM SERPL-MCNC: 7 MG/DL (ref 1.5–2.5)
MCH RBC QN AUTO: 33.7 PG (ref 27–33)
MCH RBC QN AUTO: 33.8 PG (ref 27–33)
MCHC RBC AUTO-ENTMCNC: 34.1 G/DL (ref 33.6–35)
MCHC RBC AUTO-ENTMCNC: 34.4 G/DL (ref 33.6–35)
MCV RBC AUTO: 98.3 FL (ref 81.4–97.8)
MCV RBC AUTO: 99 FL (ref 81.4–97.8)
MICRO URNS: ABNORMAL
MONOCYTES # BLD AUTO: 0.47 K/UL (ref 0–0.85)
MONOCYTES NFR BLD AUTO: 4.8 % (ref 0–13.4)
NEUTROPHILS # BLD AUTO: 7.76 K/UL (ref 2–7.15)
NEUTROPHILS NFR BLD: 80 % (ref 44–72)
NITRITE UR QL STRIP.AUTO: NEGATIVE
NRBC # BLD AUTO: 0 K/UL
NRBC BLD-RTO: 0 /100 WBC
PH UR STRIP.AUTO: 6.5 [PH]
PLATELET # BLD AUTO: 137 K/UL (ref 164–446)
PLATELET # BLD AUTO: 149 K/UL (ref 164–446)
PMV BLD AUTO: 10.9 FL (ref 9–12.9)
PMV BLD AUTO: 10.9 FL (ref 9–12.9)
PROT UR QL STRIP: NEGATIVE MG/DL
RBC # BLD AUTO: 4.06 M/UL (ref 4.2–5.4)
RBC # BLD AUTO: 4.11 M/UL (ref 4.2–5.4)
RBC # URNS HPF: ABNORMAL /HPF
RBC UR QL AUTO: NEGATIVE
SP GR UR STRIP.AUTO: 1.02
TSH SERPL DL<=0.005 MIU/L-ACNC: 0.91 UIU/ML (ref 0.38–5.33)
UROBILINOGEN UR STRIP.AUTO-MCNC: 0.2 MG/DL
WBC # BLD AUTO: 11.3 K/UL (ref 4.8–10.8)
WBC # BLD AUTO: 9.7 K/UL (ref 4.8–10.8)
WBC #/AREA URNS HPF: ABNORMAL /HPF

## 2018-07-10 PROCEDURE — 700105 HCHG RX REV CODE 258

## 2018-07-10 PROCEDURE — 36415 COLL VENOUS BLD VENIPUNCTURE: CPT

## 2018-07-10 PROCEDURE — 85027 COMPLETE CBC AUTOMATED: CPT

## 2018-07-10 PROCEDURE — 700111 HCHG RX REV CODE 636 W/ 250 OVERRIDE (IP): Performed by: OBSTETRICS & GYNECOLOGY

## 2018-07-10 PROCEDURE — 302790 HCHG STAT ANTEPARTUM CARE, DAILY

## 2018-07-10 PROCEDURE — 87653 STREP B DNA AMP PROBE: CPT

## 2018-07-10 PROCEDURE — 87086 URINE CULTURE/COLONY COUNT: CPT

## 2018-07-10 PROCEDURE — 700112 HCHG RX REV CODE 229: Performed by: OBSTETRICS & GYNECOLOGY

## 2018-07-10 PROCEDURE — 81001 URINALYSIS AUTO W/SCOPE: CPT

## 2018-07-10 PROCEDURE — A9270 NON-COVERED ITEM OR SERVICE: HCPCS | Performed by: OBSTETRICS & GYNECOLOGY

## 2018-07-10 PROCEDURE — 85025 COMPLETE CBC W/AUTO DIFF WBC: CPT

## 2018-07-10 PROCEDURE — 700105 HCHG RX REV CODE 258: Performed by: OBSTETRICS & GYNECOLOGY

## 2018-07-10 PROCEDURE — 770002 HCHG ROOM/CARE - OB PRIVATE (112)

## 2018-07-10 PROCEDURE — 700102 HCHG RX REV CODE 250 W/ 637 OVERRIDE(OP): Performed by: OBSTETRICS & GYNECOLOGY

## 2018-07-10 PROCEDURE — 83735 ASSAY OF MAGNESIUM: CPT

## 2018-07-10 PROCEDURE — 82731 ASSAY OF FETAL FIBRONECTIN: CPT

## 2018-07-10 PROCEDURE — 84443 ASSAY THYROID STIM HORMONE: CPT

## 2018-07-10 RX ORDER — MAGNESIUM SULFATE HEPTAHYDRATE 40 MG/ML
4 INJECTION, SOLUTION INTRAVENOUS ONCE
Status: COMPLETED | OUTPATIENT
Start: 2018-07-10 | End: 2018-07-10

## 2018-07-10 RX ORDER — ALUMINA, MAGNESIA, AND SIMETHICONE 2400; 2400; 240 MG/30ML; MG/30ML; MG/30ML
10 SUSPENSION ORAL 4 TIMES DAILY PRN
Status: DISCONTINUED | OUTPATIENT
Start: 2018-07-10 | End: 2018-07-10

## 2018-07-10 RX ORDER — MAGNESIUM SULFATE HEPTAHYDRATE 40 MG/ML
1 INJECTION, SOLUTION INTRAVENOUS CONTINUOUS
Status: DISCONTINUED | OUTPATIENT
Start: 2018-07-10 | End: 2018-07-11

## 2018-07-10 RX ORDER — ACETAMINOPHEN 325 MG/1
650 TABLET ORAL EVERY 4 HOURS PRN
Status: DISCONTINUED | OUTPATIENT
Start: 2018-07-10 | End: 2018-07-13 | Stop reason: HOSPADM

## 2018-07-10 RX ORDER — CALCIUM GLUCONATE 94 MG/ML
1 INJECTION, SOLUTION INTRAVENOUS
Status: DISCONTINUED | OUTPATIENT
Start: 2018-07-10 | End: 2018-07-13 | Stop reason: HOSPADM

## 2018-07-10 RX ORDER — MAGNESIUM SULFATE HEPTAHYDRATE 40 MG/ML
2 INJECTION, SOLUTION INTRAVENOUS ONCE
Status: COMPLETED | OUTPATIENT
Start: 2018-07-10 | End: 2018-07-10

## 2018-07-10 RX ORDER — LEVOTHYROXINE SODIUM 0.12 MG/1
125 TABLET ORAL
Status: DISCONTINUED | OUTPATIENT
Start: 2018-07-11 | End: 2018-07-13 | Stop reason: HOSPADM

## 2018-07-10 RX ORDER — VITAMIN A ACETATE, BETA CAROTENE, ASCORBIC ACID, CHOLECALCIFEROL, .ALPHA.-TOCOPHEROL ACETATE, DL-, THIAMINE MONONITRATE, RIBOFLAVIN, NIACINAMIDE, PYRIDOXINE HYDROCHLORIDE, FOLIC ACID, CYANOCOBALAMIN, CALCIUM CARBONATE, FERROUS FUMARATE, ZINC OXIDE, CUPRIC OXIDE 3080; 12; 120; 400; 1; 1.84; 3; 20; 22; 920; 25; 200; 27; 10; 2 [IU]/1; UG/1; MG/1; [IU]/1; MG/1; MG/1; MG/1; MG/1; MG/1; [IU]/1; MG/1; MG/1; MG/1; MG/1; MG/1
1 TABLET, FILM COATED ORAL DAILY
Status: DISCONTINUED | OUTPATIENT
Start: 2018-07-11 | End: 2018-07-13 | Stop reason: HOSPADM

## 2018-07-10 RX ORDER — ALUMINA, MAGNESIA, AND SIMETHICONE 2400; 2400; 240 MG/30ML; MG/30ML; MG/30ML
30 SUSPENSION ORAL ONCE
Status: COMPLETED | OUTPATIENT
Start: 2018-07-10 | End: 2018-07-10

## 2018-07-10 RX ORDER — SODIUM CHLORIDE, SODIUM LACTATE, POTASSIUM CHLORIDE, CALCIUM CHLORIDE 600; 310; 30; 20 MG/100ML; MG/100ML; MG/100ML; MG/100ML
INJECTION, SOLUTION INTRAVENOUS CONTINUOUS
Status: DISCONTINUED | OUTPATIENT
Start: 2018-07-10 | End: 2018-07-12

## 2018-07-10 RX ORDER — BETAMETHASONE SODIUM PHOSPHATE AND BETAMETHASONE ACETATE 3; 3 MG/ML; MG/ML
12 INJECTION, SUSPENSION INTRA-ARTICULAR; INTRALESIONAL; INTRAMUSCULAR; SOFT TISSUE EVERY 24 HOURS
Status: COMPLETED | OUTPATIENT
Start: 2018-07-10 | End: 2018-07-11

## 2018-07-10 RX ORDER — DOCUSATE SODIUM 100 MG/1
100 CAPSULE, LIQUID FILLED ORAL 2 TIMES DAILY
Status: DISCONTINUED | OUTPATIENT
Start: 2018-07-10 | End: 2018-07-13 | Stop reason: HOSPADM

## 2018-07-10 RX ORDER — SODIUM CHLORIDE, SODIUM LACTATE, POTASSIUM CHLORIDE, CALCIUM CHLORIDE 600; 310; 30; 20 MG/100ML; MG/100ML; MG/100ML; MG/100ML
INJECTION, SOLUTION INTRAVENOUS
Status: COMPLETED
Start: 2018-07-10 | End: 2018-07-10

## 2018-07-10 RX ORDER — ONDANSETRON 2 MG/ML
4 INJECTION INTRAMUSCULAR; INTRAVENOUS EVERY 6 HOURS PRN
Status: DISCONTINUED | OUTPATIENT
Start: 2018-07-10 | End: 2018-07-13 | Stop reason: HOSPADM

## 2018-07-10 RX ORDER — VITAMIN A ACETATE, BETA CAROTENE, ASCORBIC ACID, CHOLECALCIFEROL, .ALPHA.-TOCOPHEROL ACETATE, DL-, THIAMINE MONONITRATE, RIBOFLAVIN, NIACINAMIDE, PYRIDOXINE HYDROCHLORIDE, FOLIC ACID, CYANOCOBALAMIN, CALCIUM CARBONATE, FERROUS FUMARATE, ZINC OXIDE, CUPRIC OXIDE 3080; 12; 120; 400; 1; 1.84; 3; 20; 22; 920; 25; 200; 27; 10; 2 [IU]/1; UG/1; MG/1; [IU]/1; MG/1; MG/1; MG/1; MG/1; MG/1; [IU]/1; MG/1; MG/1; MG/1; MG/1; MG/1
1 TABLET, FILM COATED ORAL DAILY
Status: DISCONTINUED | OUTPATIENT
Start: 2018-07-10 | End: 2018-07-10

## 2018-07-10 RX ORDER — DOCUSATE SODIUM 100 MG/1
100 CAPSULE, LIQUID FILLED ORAL 2 TIMES DAILY
Status: DISCONTINUED | OUTPATIENT
Start: 2018-07-10 | End: 2018-07-10

## 2018-07-10 RX ADMIN — SODIUM CHLORIDE, POTASSIUM CHLORIDE, SODIUM LACTATE AND CALCIUM CHLORIDE 125 ML: 600; 310; 30; 20 INJECTION, SOLUTION INTRAVENOUS at 14:20

## 2018-07-10 RX ADMIN — FAMOTIDINE 20 MG: 10 INJECTION INTRAVENOUS at 22:40

## 2018-07-10 RX ADMIN — MAGNESIUM SULFATE IN WATER 3 G/HR: 40 INJECTION, SOLUTION INTRAVENOUS at 16:27

## 2018-07-10 RX ADMIN — MAGNESIUM SULFATE HEPTAHYDRATE 2 G: 40 INJECTION, SOLUTION INTRAVENOUS at 16:13

## 2018-07-10 RX ADMIN — DOCUSATE SODIUM 100 MG: 100 CAPSULE ORAL at 20:22

## 2018-07-10 RX ADMIN — SODIUM CHLORIDE 2.5 MILLION UNITS: 9 INJECTION, SOLUTION INTRAVENOUS at 20:24

## 2018-07-10 RX ADMIN — BETAMETHASONE SODIUM PHOSPHATE AND BETAMETHASONE ACETATE 12 MG: 3; 3 INJECTION, SUSPENSION INTRA-ARTICULAR; INTRALESIONAL; INTRAMUSCULAR at 14:43

## 2018-07-10 RX ADMIN — SODIUM CHLORIDE 5 MILLION UNITS: 900 INJECTION INTRAVENOUS at 16:13

## 2018-07-10 RX ADMIN — MAGNESIUM SULFATE IN WATER 4 G: 40 INJECTION, SOLUTION INTRAVENOUS at 15:52

## 2018-07-10 RX ADMIN — MAGNESIUM SULFATE IN WATER 2 G/HR: 40 INJECTION, SOLUTION INTRAVENOUS at 23:55

## 2018-07-10 RX ADMIN — ALUMINUM HYDROXIDE, MAGNESIUM HYDROXIDE,SIMETHICONE 30 ML: 400; 400; 40 LIQUID ORAL at 21:41

## 2018-07-10 ASSESSMENT — PAIN SCALES - GENERAL
PAINLEVEL_OUTOF10: 4
PAINLEVEL_OUTOF10: 3
PAINLEVEL_OUTOF10: 3
PAINLEVEL_OUTOF10: 4

## 2018-07-10 ASSESSMENT — PATIENT HEALTH QUESTIONNAIRE - PHQ9
1. LITTLE INTEREST OR PLEASURE IN DOING THINGS: NOT AT ALL
SUM OF ALL RESPONSES TO PHQ9 QUESTIONS 1 AND 2: 0
2. FEELING DOWN, DEPRESSED, IRRITABLE, OR HOPELESS: NOT AT ALL

## 2018-07-10 ASSESSMENT — LIFESTYLE VARIABLES: ALCOHOL_USE: NO

## 2018-07-10 ASSESSMENT — COPD QUESTIONNAIRES
IN THE PAST 12 MONTHS DO YOU DO LESS THAN YOU USED TO BECAUSE OF YOUR BREATHING PROBLEMS: DISAGREE/UNSURE
HAVE YOU SMOKED AT LEAST 100 CIGARETTES IN YOUR ENTIRE LIFE: NO/DON'T KNOW
DURING THE PAST 4 WEEKS HOW MUCH DID YOU FEEL SHORT OF BREATH: NONE/LITTLE OF THE TIME
COPD SCREENING SCORE: 0
DO YOU EVER COUGH UP ANY MUCUS OR PHLEGM?: NO/ONLY WITH OCCASIONAL COLDS OR INFECTIONS

## 2018-07-10 NOTE — PROGRESS NOTES
38yo  edc 9/, 31.4 presents from Dr. Pinto' office with admission orders for PTL. Pt states she has had UCs on an off for her entire pregnancy. She had gone into the office today with c/o SOB, abdominal tightening and back pain. She was found to be 2/75/0. FFN was collected by Dr. Pinto and was sent here.   1545 Dr. Pinto updated that Pt is jessica q5min. Magnesium Sulfate ordered.   1552 magnesium sulfate started via pump.   1600 Veras placed. Pt tolerated well.   1900 report to IBAN Yung.

## 2018-07-11 ENCOUNTER — APPOINTMENT (OUTPATIENT)
Dept: PHYSICAL THERAPY | Facility: REHABILITATION | Age: 38
End: 2018-07-11
Attending: NURSE PRACTITIONER
Payer: COMMERCIAL

## 2018-07-11 LAB
GP B STREP DNA SPEC QL NAA+PROBE: NEGATIVE
MAGNESIUM SERPL-MCNC: 6 MG/DL (ref 1.5–2.5)
MAGNESIUM SERPL-MCNC: 6.9 MG/DL (ref 1.5–2.5)
MAGNESIUM SERPL-MCNC: 7 MG/DL (ref 1.5–2.5)

## 2018-07-11 PROCEDURE — 36415 COLL VENOUS BLD VENIPUNCTURE: CPT

## 2018-07-11 PROCEDURE — 700111 HCHG RX REV CODE 636 W/ 250 OVERRIDE (IP): Performed by: OBSTETRICS & GYNECOLOGY

## 2018-07-11 PROCEDURE — 83735 ASSAY OF MAGNESIUM: CPT

## 2018-07-11 PROCEDURE — A9270 NON-COVERED ITEM OR SERVICE: HCPCS | Performed by: OBSTETRICS & GYNECOLOGY

## 2018-07-11 PROCEDURE — 700105 HCHG RX REV CODE 258: Performed by: OBSTETRICS & GYNECOLOGY

## 2018-07-11 PROCEDURE — 770002 HCHG ROOM/CARE - OB PRIVATE (112)

## 2018-07-11 PROCEDURE — 302790 HCHG STAT ANTEPARTUM CARE, DAILY

## 2018-07-11 PROCEDURE — 700102 HCHG RX REV CODE 250 W/ 637 OVERRIDE(OP): Performed by: OBSTETRICS & GYNECOLOGY

## 2018-07-11 PROCEDURE — 700112 HCHG RX REV CODE 229: Performed by: OBSTETRICS & GYNECOLOGY

## 2018-07-11 RX ORDER — NIFEDIPINE 10 MG/1
10 CAPSULE ORAL EVERY 6 HOURS
Status: DISCONTINUED | OUTPATIENT
Start: 2018-07-11 | End: 2018-07-12

## 2018-07-11 RX ORDER — MAGNESIUM SULFATE HEPTAHYDRATE 40 MG/ML
0.5 INJECTION, SOLUTION INTRAVENOUS CONTINUOUS
Status: DISCONTINUED | OUTPATIENT
Start: 2018-07-11 | End: 2018-07-11

## 2018-07-11 RX ADMIN — SODIUM CHLORIDE 2.5 MILLION UNITS: 9 INJECTION, SOLUTION INTRAVENOUS at 16:51

## 2018-07-11 RX ADMIN — SODIUM CHLORIDE 2.5 MILLION UNITS: 9 INJECTION, SOLUTION INTRAVENOUS at 00:20

## 2018-07-11 RX ADMIN — SODIUM CHLORIDE 2.5 MILLION UNITS: 9 INJECTION, SOLUTION INTRAVENOUS at 04:20

## 2018-07-11 RX ADMIN — SODIUM CHLORIDE, POTASSIUM CHLORIDE, SODIUM LACTATE AND CALCIUM CHLORIDE: 600; 310; 30; 20 INJECTION, SOLUTION INTRAVENOUS at 02:39

## 2018-07-11 RX ADMIN — MAGNESIUM SULFATE IN WATER 1 G/HR: 40 INJECTION, SOLUTION INTRAVENOUS at 10:35

## 2018-07-11 RX ADMIN — SODIUM CHLORIDE 2.5 MILLION UNITS: 9 INJECTION, SOLUTION INTRAVENOUS at 08:30

## 2018-07-11 RX ADMIN — LEVOTHYROXINE SODIUM 125 MCG: 125 TABLET ORAL at 06:44

## 2018-07-11 RX ADMIN — DOCUSATE SODIUM 100 MG: 100 CAPSULE ORAL at 18:28

## 2018-07-11 RX ADMIN — SODIUM CHLORIDE 2.5 MILLION UNITS: 9 INJECTION, SOLUTION INTRAVENOUS at 20:19

## 2018-07-11 RX ADMIN — SODIUM CHLORIDE, POTASSIUM CHLORIDE, SODIUM LACTATE AND CALCIUM CHLORIDE: 600; 310; 30; 20 INJECTION, SOLUTION INTRAVENOUS at 11:38

## 2018-07-11 RX ADMIN — BETAMETHASONE SODIUM PHOSPHATE AND BETAMETHASONE ACETATE 12 MG: 3; 3 INJECTION, SUSPENSION INTRA-ARTICULAR; INTRALESIONAL; INTRAMUSCULAR at 14:35

## 2018-07-11 RX ADMIN — SODIUM CHLORIDE, POTASSIUM CHLORIDE, SODIUM LACTATE AND CALCIUM CHLORIDE: 600; 310; 30; 20 INJECTION, SOLUTION INTRAVENOUS at 21:04

## 2018-07-11 RX ADMIN — NIFEDIPINE 10 MG: 10 CAPSULE, LIQUID FILLED ORAL at 18:28

## 2018-07-11 RX ADMIN — SODIUM CHLORIDE 2.5 MILLION UNITS: 9 INJECTION, SOLUTION INTRAVENOUS at 11:55

## 2018-07-11 ASSESSMENT — PATIENT HEALTH QUESTIONNAIRE - PHQ9
2. FEELING DOWN, DEPRESSED, IRRITABLE, OR HOPELESS: NOT AT ALL
1. LITTLE INTEREST OR PLEASURE IN DOING THINGS: NOT AT ALL
SUM OF ALL RESPONSES TO PHQ9 QUESTIONS 1 AND 2: 0

## 2018-07-11 NOTE — CARE PLAN
Problem: Risk for Deep Vein Thrombosis/Venous Thromboembolism  Goal: DVT/VTE Prevention Measures in Place    Intervention: Intermittent sequential compression device in place per MD order  Pt has SCD's in place to for DVT prevention      Problem: Risk for injury  Goal: Patient and fetus will be free of preventable injury/complications    Intervention: Monitor Fetal well being  FHT's monitored continuously throughout stay while ordered

## 2018-07-11 NOTE — PROGRESS NOTES
EDC - 18 EGA - 31.5    0700 - Report received from Aga FERRERA. POC discussed, care assumed.   0735 - Dr. Pinto at bedside. POC discussed. Orders received to decrease magnesium to 1g/hr. May remove woodruff catheter when patient more able to tolerate sitting up without getting dizzy/lightheaded and nauseated.   0740 -  Full Assessment complete. VSS. No complaints of regular contractions, ROM, or vaginal bleeding at this time. Pt reports good FM. Pt still very lethargic and nauseated off and on. Declines Zofran at this time POC discussed with pt and family members, all questions answered.   0930 - attempted to sit up, pt became very nauseated and dizzy. Laid back down at this time.  1050 - pt sleeping. Audible breathing noted. Did not disturb  1240 - Jimy from Lab called with critical result of Magnesium at 6.0. Critical lab result read back to Jimy.   This critical lab result is within parameters established by  for this patient  1245 - Dr. Tolbert updated.  1330 - Dr. Tolbert at bedside. Orders received.  1435 - 2nd dose betamethasone given. See MAR  1600 - pt resting in bed. Discussed evening POC. Questions answered at length.  1750 - Dr. Pinto updated. No new orders at this time.  1850 - Magnesium stopped  1900 - Report given to Aga FERRERA. POC discussed.

## 2018-07-11 NOTE — CARE PLAN
Problem: Infection  Goal: Will remain free from infection    Intervention: Assess signs and symptoms of infection  Pt has remained free of s/s of infection.       Problem: Venous Thromboembolism (VTW)/Deep Vein Thrombosis (DVT) Prevention:  Goal: Patient will participate in Venous Thrombosis (VTE)/Deep Vein Thrombosis (DVT)Prevention Measures    Intervention: Ensure patient wears graduated elastic stockings (HOLA hose) and/or SCDs, if ordered, when in bed or chair (Remove at least once per shift for skin check)  SCD's on throughout night.

## 2018-07-11 NOTE — CARE PLAN
Problem: Knowledge Deficit  Goal: Patient/Support Person demonstrates understanding regarding condition, prognosis and treatment needs during the pregnancy  Outcome: PROGRESSING AS EXPECTED  Continual assessment of understanding regarding POC, use of medications and Lab results. Education on these topics and more with encouragement for Pts involvement in POC.     Problem: Risk for Infection, Impaired Wound Healing  Goal: Remain free from signs and symptoms of infection  Outcome: PROGRESSING AS EXPECTED  Pt instructed on hand washing and hand  stations at the door. Pt will be monitored for s/s of infection during her stay.

## 2018-07-11 NOTE — PROGRESS NOTES
2130 Pt c/o heartburn. Dr. rOta notified. Orders received  2141 Maalox given  0150 Pt c/o heartburn and nausea. Offered medication for nausea, pt declines.  0152 Pt vomiting, still has reflux, c/o blurred vision again, and urethra burning.  2210 Dr. Orta notified of pt complants. New orders received.  2215 Magnesium Sulfate decreased to 2 grams/hr. Ice pack to perineum. Offered Zofran again, pt declines.  2240 Pepcid given SIVP. Pt reports nausea and reflux feel better now. Ice pack is helping so cath is not so irritating.   2333 Pt resting in bed with eyes closed.  0100 Pt resting in bed with eyes closed. No s/s of distress noted  0230 Pt resting in bed. Pt reports she still has nausea but denies wanting medication for nausea. Ice pack on perineum replaced.

## 2018-07-11 NOTE — PROGRESS NOTES
0517 At pt bedside, pt reports feeling nauseous, pressure in chest and blurry vision. Stat pt up at side of bed. Lungs clear, DTR's 2+, no clonus, O2 sat 96% Resp 18. Pt sat at side of bed for about 2 minutes then reported she felt worse and reclined in bed. Stat mag level ordered. Dr. Orta notified of pt c/o and VS. Orders to decrease Magnesium to 1.5grams/hr.   0553 Pt vomiting. Refuses medication for nausea  0615 Dr. Orta at bedside  0620 Pt reports she is feeling better at this time.   0700 Report to NORMA Fajardo RN

## 2018-07-11 NOTE — PROGRESS NOTES
HD #1  IUP at 32 1/7 weeks, PTL    S:  Pt has been having some nausea, feeling short of breath and having  blurry vision from the Mag Sulfate overnight. Declined Zofran for nausea. Was having issues with GERD and took Maalox and Pepcid. She has had two episodes of vomiting, but feels better now.  No VB or LOF.   Feels baby moving.  Reports contractions have decreased.    O:  P 77  O2Sat 97% on RA  /64  Lungs: CTA bilaterally  ABD:  Gravid, NT  EXT: No cords or Homans  U/O ave 75 mL /hour  FHTs 115 Cat 1  [place]: occ  Cx: deferred - was 2/75/0 VTX on admission  STAT Mag level pending    A/P:  IUP at 32 1/7 weeks, PTL - fair  1.  PTL:  Currently on Mag at 1.5 gm/hour.  Follow up on mag level and continue to adjust as needed.  Pt on Abx for unknown GBS status at this time.  Second dose of B-meth due at 1445 hours.  2. NICU consult placed  3.  Dr. Tolbert to see pt today

## 2018-07-11 NOTE — PROGRESS NOTES
Angela from Lab called with critical result of Magnesium at 7.0. Critical lab result read back to Angela.   Dr. Orta  notified of critical lab result at 0128.  Critical lab result read back by Dr. Orta.

## 2018-07-11 NOTE — H&P
CHIEF COMPLAINT:  Low back pain and pelvic pressure.    HISTORY OF PRESENT ILLNESS:  This patient is a 37-year-old  1, para 0   at 32 weeks pregnant with a due date of 2018 by last menstrual period of   2017, and ultrasound at 2018.  Patient is advanced maternal age   and has been referred to Dr. Jackie Tolbert, had a first and second trimester   ultrasound on 2018 and on 2018.  Most recently, the patient had   another ultrasound last week.  At that time, the fetal measurements were hard   to obtain secondary to the head being low in the pelvis.  The patient called   today asking if she can use vaginal dilator as suggested by her physical   therapist.  I instructed the patient to come in.  When she came in, she stated   that she has been seeing a physical therapist and the physical therapist has   been doing vaginal manipulation to help with low back pain.  She has been   complaining of low back and pressure.  At this time, I examined the patient, I   did a fetal fibronectin and a pelvic exam.  Her pelvic shows that she is 2 cm   dilated, 75% effaced, and 0 station.  I discussed with the patient that she   was in  labor and therefore send her to the hospital for admission.  I   also discussed with Dr. Tolbert and Dr. Tolbert will be in to see the   patient this afternoon.    PAST MEDICAL HISTORY:  1.  Interstitial cystitis.  2.  Hypothyroid.  3.  Irritable bowel syndrome.  4.  Rh negative.    PAST SURGICAL HISTORY:  1.  Thyroidectomy in .  2.  Previous mastoidectomy of the left mastoid.  3.  Tonsillectomy.  4.  Left arm surgery for a broken left arm.    MEDICATIONS:  She is currently on levothyroxine 125 mcg 1 p.o. daily and   prenatal vitamins.    ALLERGIES:  ROCEPHIN CAUSES HIVES, MINOCYCLINE CAUSES HIVES, NEURONTIN,   MACROBID AND TAPAZOLE.    OBSTETRICAL HISTORY:  She is a  1, para 0.    GYNECOLOGIC HISTORY:  Patient started menstruating at age 13, has  menstrual   cycles every 28 days, last about 5 days.  No history of STDs.  No history of   HSV.    SOCIAL HISTORY:  Patient is .  Denies tobacco, alcohol or drug use.    PHYSICAL EXAMINATION:  VITAL SIGNS:  Stable.  She is afebrile.  Her total weight gain this pregnancy   has been 20 pounds.  GENERAL:  Pleasant female in no acute distress.  LUNGS:  Clear to auscultation bilaterally.  CARDIOVASCULAR:  Regular rate and rhythm.  No murmur.  ABDOMEN:  Soft, gravid.  Fundal height is 33.  EXTREMITIES:  No calf tenderness.  GENITOURINARY:  Sterile vaginal exam, fetal fibronectin collected.  Sterile   vaginal exam, she is 2 cm dilated, 75% effaced, 0 station, vertex   presentation.    LABORATORY DATA:  Patient's prenatal labs are H and H at 28 weeks 13.4 and   39.3, platelets were 162.  Her 1-hour glucose was 119.  Antibody screen was   negative and urinalysis at that time  was negative.  Patient's MSAFP   negative, cell free fetal DNA negative for trisomy 18, 13, and 21.  Patient is   O negative, rubella immune, hepatitis B surface antigen negative, HIV   nonreactive, RPR nonreactive.  Patient had ultrasound with Dr. Tolbert last   week and we are awaiting the report.  The previous ultrasound on 2018,   the fetus was measuring 20 weeks and 6 days, size equal to date, anterior   placenta without any abnormality seen.  In labor and delivery, fetal heart   tones are in the 130s, category 1.  [place] jessica every 5 minutes, she was   jessica every 1-3 minutes when she arrived and of course the sterile   vaginal exam in my office was 2 cm dilated, 75% effaced, and 0 station.    ASSESSMENT AND PLAN:  A 37-year-old  1, para 0 at 32 weeks by dates and   ultrasound.  1.   labor/advanced cervical dilatation.  At this time, the patient has   been admitted to labor and delivery.  Group B strep culture has been   obtained, the urine with culture and sensitivity has been done.  The patient   has  been admitted.  She has been started on magnesium sulfate secondary to   contractions and  labor.  She also had one dose of betamethasone 12 mg   and will get the second dose in 24 hours.  She also has been started on   penicillin for group B streptococcus prophylaxis.  The plan is for Dr. Tolbert to see her this evening and patient is aware that she will be   admitted until further notice.  2.  Advanced maternal age, had normal cell free fetal DNA, negative MSAFP and   normal ultrasound with maternal fetal medicine physician.  3.  Postoperative hypothyroidism.  Patient is euthyroid on her levothyroxine   125 mcg 1 p.o. daily.  4.  Interstitial cystitis.  5.  Irritable bowel syndrome.  6.  Rh negative status post RhoGAM at 28 weeks.  7.  Fetal status reassuring.       ____________________________________     TEODORO DASH MD    [facility]    DD:  07/10/2018 17:42:29  DT:  07/10/2018 18:34:11    D#:  [id number]  Job#:  [id number]    cc: NATHANIEL TOLBERT MD

## 2018-07-11 NOTE — PROGRESS NOTES
DATE OF SERVICE:  2018    SUBJECTIVE:  This is a 37-year-old  1, para 0, at 32 weeks and 1 day,   who was admitted yesterday with  labor at 2 cm dilated.  Patient is on   magnesium sulfate.  She is having a hard time opening her eyes.  The mag level   was elevated yesterday, it dropped from 3 g an hour to 1.5 g an hour.  At   this time, the patient is flushed.  Denies any shortness of breath.  She is   feeling maybe one contraction an hour, no leaking of fluid, no vaginal   bleeding, and she is having good fetal movement.    OBJECTIVE:  VITAL SIGNS:  Stable.  She is afebrile.  GENERAL:  Pleasant female, no acute distress, appears flushed.  ABDOMEN:  Soft, gravid, nontender, nondistended.  EXTREMITIES:  No calf tenderness.  PELVIC:  Fetal heart tones 115, category 1.  Decreased long-term variability,   likely secondary to mag sulfate.  [place]:  None.  Sterile vaginal exam deferred.    LABORATORY DATA:  Fetal fibronectin from yesterday was negative.  Her mag   level this morning at 5:33 was 6.9.  Urine was negative except for ketones.    Her white count 11.3, H and H 13.9 and 40.4, and platelets were 149.    ASSESSMENT AND PLAN:  A 37-year-old  1, para 0, at 32 weeks and 1 day   by dates and ultrasound.  1.   labor.  Patient at this time is quiescent on magnesium sulfate.    She is at 1.5 g an hour, but we are decreasing it now to 1 g an hour.    Patient's Veras catheter has been bothering her and therefore, we will   discontinue the Veras this morning and she will be able to need to get up with   assistance or use a bedside commode.  At this time, contractions have spaced   out.  We may consider discontinuing the magnesium sulfate this afternoon after   she gets her second betamethasone at 2:30 and then starting her on   nifedipine.  At this time, the patient is quiescent and we will consider   sending her home and staying off of work until further notice.  2.  Rh negative status post  RhoGAM at 28 weeks.  3.  Low platelets at 149.  4.  Fetal status overall reassuring.       ____________________________________     MD HANNAH HOLLEY / NONI    DD:  07/11/2018 07:57:05  DT:  07/11/2018 10:10:47    D#:  [id number]  Job#:  [id number]    cc: NATHANIEL TOVAR MD

## 2018-07-11 NOTE — PROGRESS NOTES
Lab called with critical result of Magnesium level at 7.0. Critical lab result read back to lab.   Dr. Orta notified of critical lab result at 2008.  Critical lab result read back by  2008. New orders given

## 2018-07-11 NOTE — PROGRESS NOTES
Alisha from Lab called with critical result of 6.9 Magnesium at 0645. Critical lab result read back to [place].   Dr. Orta notified of critical lab result at 0649.  Critical lab result read back by Dr. Orta.

## 2018-07-12 ENCOUNTER — TELEPHONE (OUTPATIENT)
Dept: PHYSICAL THERAPY | Facility: REHABILITATION | Age: 38
End: 2018-07-12

## 2018-07-12 PROCEDURE — 700111 HCHG RX REV CODE 636 W/ 250 OVERRIDE (IP)

## 2018-07-12 PROCEDURE — 700111 HCHG RX REV CODE 636 W/ 250 OVERRIDE (IP): Performed by: OBSTETRICS & GYNECOLOGY

## 2018-07-12 PROCEDURE — 700105 HCHG RX REV CODE 258: Performed by: OBSTETRICS & GYNECOLOGY

## 2018-07-12 PROCEDURE — 302790 HCHG STAT ANTEPARTUM CARE, DAILY

## 2018-07-12 PROCEDURE — 700102 HCHG RX REV CODE 250 W/ 637 OVERRIDE(OP): Performed by: OBSTETRICS & GYNECOLOGY

## 2018-07-12 PROCEDURE — 59025 FETAL NON-STRESS TEST: CPT | Performed by: OBSTETRICS & GYNECOLOGY

## 2018-07-12 PROCEDURE — A9270 NON-COVERED ITEM OR SERVICE: HCPCS | Performed by: OBSTETRICS & GYNECOLOGY

## 2018-07-12 PROCEDURE — 770002 HCHG ROOM/CARE - OB PRIVATE (112)

## 2018-07-12 PROCEDURE — 700112 HCHG RX REV CODE 229: Performed by: OBSTETRICS & GYNECOLOGY

## 2018-07-12 RX ORDER — NIFEDIPINE 10 MG/1
10 CAPSULE ORAL EVERY 6 HOURS
Status: DISCONTINUED | OUTPATIENT
Start: 2018-07-12 | End: 2018-07-13

## 2018-07-12 RX ORDER — TERBUTALINE SULFATE 1 MG/ML
0.25 INJECTION, SOLUTION SUBCUTANEOUS ONCE
Status: COMPLETED | OUTPATIENT
Start: 2018-07-12 | End: 2018-07-12

## 2018-07-12 RX ORDER — NIFEDIPINE 10 MG/1
20 CAPSULE ORAL EVERY 6 HOURS
Status: DISCONTINUED | OUTPATIENT
Start: 2018-07-12 | End: 2018-07-12

## 2018-07-12 RX ORDER — TERBUTALINE SULFATE 1 MG/ML
INJECTION, SOLUTION SUBCUTANEOUS
Status: COMPLETED
Start: 2018-07-12 | End: 2018-07-12

## 2018-07-12 RX ADMIN — NIFEDIPINE 10 MG: 10 CAPSULE, LIQUID FILLED ORAL at 00:13

## 2018-07-12 RX ADMIN — Medication 1 TABLET: at 05:57

## 2018-07-12 RX ADMIN — TERBUTALINE SULFATE 0.25 MG: 1 INJECTION, SOLUTION SUBCUTANEOUS at 23:44

## 2018-07-12 RX ADMIN — LEVOTHYROXINE SODIUM 125 MCG: 125 TABLET ORAL at 08:37

## 2018-07-12 RX ADMIN — SODIUM CHLORIDE 2.5 MILLION UNITS: 9 INJECTION, SOLUTION INTRAVENOUS at 04:15

## 2018-07-12 RX ADMIN — SODIUM CHLORIDE 2.5 MILLION UNITS: 9 INJECTION, SOLUTION INTRAVENOUS at 00:13

## 2018-07-12 RX ADMIN — DOCUSATE SODIUM 100 MG: 100 CAPSULE ORAL at 05:57

## 2018-07-12 RX ADMIN — TERBUTALINE SULFATE 0.25 MG: 1 INJECTION, SOLUTION SUBCUTANEOUS at 16:11

## 2018-07-12 RX ADMIN — SODIUM CHLORIDE, POTASSIUM CHLORIDE, SODIUM LACTATE AND CALCIUM CHLORIDE: 600; 310; 30; 20 INJECTION, SOLUTION INTRAVENOUS at 06:00

## 2018-07-12 RX ADMIN — NIFEDIPINE 10 MG: 10 CAPSULE, LIQUID FILLED ORAL at 23:42

## 2018-07-12 RX ADMIN — NIFEDIPINE 10 MG: 10 CAPSULE, LIQUID FILLED ORAL at 18:53

## 2018-07-12 RX ADMIN — NIFEDIPINE 10 MG: 10 CAPSULE, LIQUID FILLED ORAL at 11:31

## 2018-07-12 RX ADMIN — NIFEDIPINE 10 MG: 10 CAPSULE, LIQUID FILLED ORAL at 05:57

## 2018-07-12 ASSESSMENT — PATIENT HEALTH QUESTIONNAIRE - PHQ9
2. FEELING DOWN, DEPRESSED, IRRITABLE, OR HOPELESS: NOT AT ALL
1. LITTLE INTEREST OR PLEASURE IN DOING THINGS: NOT AT ALL
SUM OF ALL RESPONSES TO PHQ9 QUESTIONS 1 AND 2: 0
2. FEELING DOWN, DEPRESSED, IRRITABLE, OR HOPELESS: NOT AT ALL
SUM OF ALL RESPONSES TO PHQ9 QUESTIONS 1 AND 2: 0
1. LITTLE INTEREST OR PLEASURE IN DOING THINGS: NOT AT ALL

## 2018-07-12 ASSESSMENT — PAIN SCALES - GENERAL
PAINLEVEL_OUTOF10: 0

## 2018-07-12 NOTE — PROGRESS NOTES
1900 Assumed pt care.   1915 Veras cath removed.   1957 Pt up to use bedside commode. Pt denies dizziness or nausea while up. Pt instructed to call for assistance next time and we will walk to BR, pt verbalized understanding.   0016 Assisted pt to bathroom. Pt ambulated well. No c/o dizziness or nausea. Pt denies cramping or u/c's at this time.   0315 Pt resting in bed eyes closed, left lateral.   0415 RN at bedside adjusting monitor. Audible 's hiccups present, monitor not picking up heart rate at this time.   0630 Dr. Pinto at bedside  0640 New orders from Dr. Pinto. Saline lock IV, d/c LR, d/c Penicillin. NST q shift continuous toco.   0645 IV saline locked.   0700 Report to IBAN Moreno

## 2018-07-12 NOTE — CONSULTS
DATE OF SERVICE:  2018    MATERNAL FETAL MEDICINE CONSULTATION    REFERRING PHYSICIAN:  Jennifer Pinto MD    INDICATION FOR CONSULTATION:  A 32-week pregnancy, admitted yesterday with    contractions and cervical dilatation of 2 cm.  The consultation is for   subsequent management.    HISTORY OF PRESENT ILLNESS:  The patient is a 37-year-old  1, para 0   with an estimated date of confinement of 2018 making her 32 weeks and 1   day gestational age on the day of consultation.    The patient was admitted after seeing Dr. Pinto yesterday in the office   with complaints of uterine cramping and back pain and pelvic pressure.  She   had been seen a physical therapist because of chronic low back pain who had   been performing a pelvic exam and vaginal manipulation and massage to help   with this low back pain.  Dr. Pinto after hearing about this asked the   patient to come to her office for examination.  The physical therapist had   recommended vaginal dilators because of vaginal tightness and felt that this   might be the cause of her low back pain.    Dr. Pinto first performed a fetal fibronectin and then did a pelvic exam   and found her to be 2 cm dilated, 75% effaced with a head low in the pelvis at   0 station.  She was admitted to labor and delivery at [facility] for   betamethasone because of the advanced cervical dilatation.  After admission,   they found that she was jessica anywhere from every 2-5 minutes and   therefore was started on magnesium sulfate tocolysis at least until the course   of steroids had been given.    Her prenatal course has, otherwise, been uncomplicated except for the   persistent low back pain and pelvic pressure.  She was seen at the fetal   imaging center for first trimester ultrasound and a second trimester   ultrasound for fetal anatomy.  She had noninvasive prenatal screening done,   which revealed a normal fetus that was low risk for fetal  aneuploidy.    I most recently saw her on 07/03 for a new referral for poor maternal weight   gain and size less than dates.  She has a BMI of 19 and total pound weight   gain has been 13 pounds.  She weighed 131 prepregnancy and her weight in my   office a week ago was 143.    An ultrasound performed on 07/03 revealed a normally grown fetus with an   estimated weight of 3 pounds 12 ounces, which is 1698 g and the 57th   percentile for gestational age.  The amniotic fluid index was 15 and umbilical   artery Dopplers were normal with an F/D ratio of 2.83.  The only thing of   note on that ultrasound is that the head was extremely low in the pelvis and   intracranial anatomy could not be obtained, although it was normal at the   20-week ultrasound.  The patient was not complaining of contractions or   cramping during that ultrasound.    Since admission to [facility], she very rapidly reached a therapeutic level on a 2   g magnesium sulfate infusion and in fact the initial level came back over 7   and the rate of infusion has been turned down multiple times and currently is   at 1 g per hour after her mag level returned 6.9 on 1.5 g per hour.  A recent   magnesium level on 1 g is 6.0.  The patient is still complaining of drowsiness   and magnesium sulfate side effects.  She has also been complaining of the   Veras irritating her bladder and she has a history of interstitial cystitis.    At the time of my visit, her contractions are 2-3 per hour.  The fetal heart   rate was reassuring on admission.  The fetal heart rate now shows effects of   magnesium sulfate, but no decelerations.    PAST MEDICAL HISTORY:  Significant for interstitial cystitis, irritable bowel   syndrome, hypothyroidism secondary to Graves' disease or some form of   hyperthyroidism that she received Tapazole and PTU for.    PRENATAL LABORATORY DATA:  Please see Dr. Pinto's dictation.    Labs on admission to [facility] show a hematocrit of 40.2, white  count was 9.7.    She had a negative urinalysis.  Her TSH is 0.910.  She had a platelet count of   137 on admission; however, her platelets were 167 on her original prenatal   labs.    The fetal fibronectin performed in Dr. Pinto's office yesterday returned   negative.  She also had a negative fetal fibronectin in May from Dr. Pinto's office because she was complaining of cramping and pelvic   pressure.    PHYSICAL EXAMINATION:  VITAL SIGNS:  Blood pressure 110/67, pulse of 82, temperatures 36.3,   respirations 18, pulse oximetry shows a pO2 of 97-99 on magnesium sulfate.  GENERAL:  She is a  female in no acute distress.  LUNGS:  Clear.  HEART:  Shows a regular rate and rhythm.  ABDOMEN:  Nontender.  Fundal height is consistent with her dates.  PELVIC:  Deferred at this time.  NEUROLOGIC:  She is alert, but somewhat groggy and flushed from the magnesium   sulfate infusion.    IMPRESSION:  1.  Pregnancy at 32 weeks 1 day by excellent dating criteria.  2.  Normally grown fetus without growth restriction.  3.   labor and contractions, currently arrested on magnesium sulfate   tocolysis.  4.  She has received her second dose of betamethasone.    RECOMMENDATIONS:  1.  I decreased her magnesium sulfate infusion to 0.5 g per hour.  At that   time, she received her second dose of betamethasone.  2.  I have written orders to discontinue her magnesium sulfate 4 hours from   now and to start nifedipine 10 mg p.o. q. 6 hours.  3.  I think we should be able to get her converted to oral tocolysis unless   her blood pressure is too low on the nifedipine.  4.  I have instructed her that she will not be able to return to work upon   discharge and will need to remain at bed rest and on the nifedipine until she   reaches 36 weeks gestational age.  5.  She will need to be observed in the hospital for another 1-2 days once on   oral nifedipine to make sure that she is not jessica or changing her    cervix.  6.  I will probably perform a cervical length to recheck her cervical exam   prior to discharge.  She does have a return appointment in my office in a   couple of weeks to check fetal growth.       ____________________________________     MD MARGAUX AU / NONI    DD:  07/11/2018 15:46:00  DT:  07/11/2018 17:14:19    D#:  [id number]  Job#:  [id number]    cc: TEODORO DASH MD

## 2018-07-12 NOTE — CARE PLAN
Problem: Fluid Volume:  Goal: Will maintain balanced intake and output  Outcome: MET Date Met: 07/12/18  Pt voiding without difficulty. No swelling noted.     Problem: Safety  Goal: Free from accidental injury    Intervention: Initiate Safety Measures  Pt instructed to call RN or CNA to help to bathroom if she feels lightheaded or dizzy, pt verbalized understanding.

## 2018-07-12 NOTE — PROGRESS NOTES
L&D Progress Note    Name:   Lisa Arevalo   Date/Time:  7/12/2018 6:38 AM  Gestational Age:  32.2   Admit Date:   7/10/2018  Admitting Dx:   Pregnancy  IUP at 32.2/PTL    Subjective:  Pt slept most of the night. Pt felt two contractions last night. Pt off of Magnesium Sulfate and now on Nifedipine and tolerating it, except for a mild headache this morning.   Uterine contractions: no  Pain:    no  Complaints:   no   Headache: .  yes  Blurred vision:  no   SOB:    no   Nausea/vomiting:  no  Epigastric pain:  no  Fetal movement:  normal  Vaginal bleeding: . no    Objective:   Vitals:    07/11/18 1929 07/11/18 1940 07/11/18 2344 07/12/18 0342   BP:  113/60 102/55 110/52   Pulse:  86 84 70   Resp: 18 17 17 16   Temp: 36.4 °C (97.6 °F) 37.1 °C (98.7 °F) 36.6 °C (97.9 °F) 36.9 °C (98.4 °F)   TempSrc: Temporal      SpO2:       Weight:       Height:         Fetal heart variability: 120's category 1 no decels  [place]: rare  SVE: defer      Meds:   Nifedipine 10 mg q 6 hrs, Penicillin G, prenatal vitamin, colace, synthoid 125mcg qd.     Labs:  Recent Results (from the past 72 hour(s))   URINALYSIS    Collection Time: 07/10/18  2:20 PM   Result Value Ref Range    Color Yellow     Character Clear     Specific Gravity 1.018 <1.035    Ph 6.5 5.0 - 8.0    Glucose Negative Negative mg/dL    Ketones 80 (A) Negative mg/dL    Protein Negative Negative mg/dL    Bilirubin Negative Negative    Urobilinogen, Urine 0.2 Negative    Nitrite Negative Negative    Leukocyte Esterase Moderate (A) Negative    Occult Blood Negative Negative    Micro Urine Req Microscopic    URINE CULTURE(NEW)    Collection Time: 07/10/18  2:20 PM   Result Value Ref Range    Significant Indicator NEG     Source UR     Site URINE, CLEAN CATCH     Urine Culture Culture in progress.    FETAL FIBRONECTIN    Collection Time: 07/10/18  2:20 PM   Result Value Ref Range    Fetal Fibronectin Negative    URINE MICROSCOPIC (W/UA)    Collection Time: 07/10/18  2:20 PM    Result Value Ref Range    WBC 2-5 /hpf    RBC 2-5 (A) /hpf    Bacteria Few (A) None /hpf    Epithelial Cells Few /hpf    Ca Oxalate Crystal Rare /hpf    Hyaline Cast 3-5 (A) /lpf   GRP B STREP, BY PCR (MEADE BROTH)    Collection Time: 07/10/18  2:35 PM   Result Value Ref Range    Strep Gp B DNA PCR Negative Negative   CBC WITH DIFFERENTIAL    Collection Time: 07/10/18  3:00 PM   Result Value Ref Range    WBC 9.7 4.8 - 10.8 K/uL    RBC 4.06 (L) 4.20 - 5.40 M/uL    Hemoglobin 13.7 12.0 - 16.0 g/dL    Hematocrit 40.2 37.0 - 47.0 %    MCV 99.0 (H) 81.4 - 97.8 fL    MCH 33.7 (H) 27.0 - 33.0 pg    MCHC 34.1 33.6 - 35.0 g/dL    RDW 43.5 35.9 - 50.0 fL    Platelet Count 137 (L) 164 - 446 K/uL    MPV 10.9 9.0 - 12.9 fL    Neutrophils-Polys 80.00 (H) 44.00 - 72.00 %    Lymphocytes 13.70 (L) 22.00 - 41.00 %    Monocytes 4.80 0.00 - 13.40 %    Eosinophils 0.20 0.00 - 6.90 %    Basophils 0.40 0.00 - 1.80 %    Immature Granulocytes 0.90 0.00 - 0.90 %    Nucleated RBC 0.00 /100 WBC    Neutrophils (Absolute) 7.76 (H) 2.00 - 7.15 K/uL    Lymphs (Absolute) 1.33 1.00 - 4.80 K/uL    Monos (Absolute) 0.47 0.00 - 0.85 K/uL    Eos (Absolute) 0.02 0.00 - 0.51 K/uL    Baso (Absolute) 0.04 0.00 - 0.12 K/uL    Immature Granulocytes (abs) 0.09 0.00 - 0.11 K/uL    NRBC (Absolute) 0.00 K/uL   HOLD BLOOD BANK SPECIMEN (NOT TESTED)    Collection Time: 07/10/18  3:00 PM   Result Value Ref Range    Holding Tube - Bb DONE    TSH    Collection Time: 07/10/18  3:00 PM   Result Value Ref Range    TSH 0.910 0.380 - 5.330 uIU/mL   CBC WITHOUT DIFFERENTIAL    Collection Time: 07/10/18  6:49 PM   Result Value Ref Range    WBC 11.3 (H) 4.8 - 10.8 K/uL    RBC 4.11 (L) 4.20 - 5.40 M/uL    Hemoglobin 13.9 12.0 - 16.0 g/dL    Hematocrit 40.4 37.0 - 47.0 %    MCV 98.3 (H) 81.4 - 97.8 fL    MCH 33.8 (H) 27.0 - 33.0 pg    MCHC 34.4 33.6 - 35.0 g/dL    RDW 42.3 35.9 - 50.0 fL    Platelet Count 149 (L) 164 - 446 K/uL    MPV 10.9 9.0 - 12.9 fL   SERUM MAGNESIUM LEVELS  DAILY     Collection Time: 07/10/18  6:49 PM   Result Value Ref Range    Magnesium 7.0 (HH) 1.5 - 2.5 mg/dL   SERUM MAGNESIUM LEVELS     Collection Time: 07/11/18 12:53 AM   Result Value Ref Range    Magnesium 7.0 (HH) 1.5 - 2.5 mg/dL   MAGNESIUM    Collection Time: 07/11/18  5:33 AM   Result Value Ref Range    Magnesium 6.9 (HH) 1.5 - 2.5 mg/dL   SERUM MAGNESIUM LEVELS     Collection Time: 07/11/18 11:51 AM   Result Value Ref Range    Magnesium 6.0 (HH) 1.5 - 2.5 mg/dL         Assessment:   Gestational Age:   IUP at 32.2/PTL  Pt is quiescent. Now off magnesium sulfate since yesterday. Pt tolerating Nifedipine. Pt has completed betamethasone series. GBBS is negative, will d/c Penicillin G. Heplock IV. NST q shift, but continue with continous toco. Possible d/c home tomorrow.   Hypothyroid: Euthyroid on synthroid 125mcg qd.     Patient Active Problem List    Diagnosis Date Noted   • Pain of multiple sites 12/16/2017   • Anxiety 12/16/2017   • Gluten intolerance 12/16/2017   • Multiple joint pain 12/15/2017   • Patient desires pregnancy 07/25/2017   • Acute bilateral low back pain with sciatica 07/25/2017   • Hypothyroidism, postsurgical 05/11/2016   • Fibromyalgia    • Vitamin d deficiency 04/30/2013   • S/P total thyroidectomy for Graves Disease, 2010 07/17/2012   • B12 deficiency 12/21/2011   • Migraine    • Interstitial cystitis        Jennifer Pinto M.D.

## 2018-07-12 NOTE — PROGRESS NOTES
"0700 Report rcvd from IBAN Yung, at bedside. POC discussed, pt care assumed. Pt found to be sitting up in bed, nauseous. Declines offer of nausea meds.   0800 Assessment done. Denies LOF or VB. Pos FM. Denies feeling UCs but has occasional \"tightness\". No needs at this time.  1400 Pt having more UCs. States she feels \"tightness\" but that they are not like when she first arrived here. Pt up to void and have BM.   1600 Pt would like to shower. Dr. Orta updated about pts request for a shower and about UCs. Orders rcvd for terbutaline and increased nifedipine dose.   1611 Terbutaline given. Pt tolerated well.   1800 Pt feeling much better. No tightening. Family here. Will wait to take shower. Dr. Orta in department. Will change order back for nifedipine to original dose.   1900 Report to IBAN Membreno, at bedside.         "

## 2018-07-12 NOTE — TELEPHONE ENCOUNTER
Francia Tolbert and Dr. Pinto,    I received a call from Lisa Arevalo letting me know she was being admitted to the hospital due to  labor. I have been treating her for low back pain, rectal pain and pelvic pain during her pregnancy. I did one session of intravaginal work mostly at the introitus due to her hypertonic pelvic floor and history of IC. This helped reduce her rectal and pelvic pain significantly. I then instructed the patient in perineal massage to begin 6-8 weeks before her projected due date. I then instructed the pt to purchase vaginal dilators to help maintain mobility of her pelvic floor muscles as she was very concerned with her ability to deliver vaginally. All of the treatments were cleared with her primary OB/Gyn Dr. Pinto.     I discussed at length with Lisa that we would not do regular intravaginal tissue manipulation due to the slight but unlikely potential to induce labor and therefore the dilators were appropriate. Dilators are appropriate for women in their 3rd trimester as long as they have been cleared for to continue with intercourse.     I am contacting you both because in doing a chart review regarding her admission I just wanted to be very clear exactly what was occurring with physical therapy and the reasoning for my treatment and suggested home program. Please do not hesitate to contact me with any questions.     Sincerely,     Hanh Hollis, PT, DPT

## 2018-07-13 VITALS
DIASTOLIC BLOOD PRESSURE: 67 MMHG | TEMPERATURE: 97.6 F | OXYGEN SATURATION: 95 % | HEIGHT: 68 IN | BODY MASS INDEX: 21.22 KG/M2 | SYSTOLIC BLOOD PRESSURE: 110 MMHG | RESPIRATION RATE: 17 BRPM | HEART RATE: 72 BPM | WEIGHT: 140 LBS

## 2018-07-13 PROCEDURE — 700102 HCHG RX REV CODE 250 W/ 637 OVERRIDE(OP): Performed by: OBSTETRICS & GYNECOLOGY

## 2018-07-13 PROCEDURE — 59025 FETAL NON-STRESS TEST: CPT | Performed by: OBSTETRICS & GYNECOLOGY

## 2018-07-13 PROCEDURE — A9270 NON-COVERED ITEM OR SERVICE: HCPCS | Performed by: OBSTETRICS & GYNECOLOGY

## 2018-07-13 RX ORDER — TERBUTALINE SULFATE 1 MG/ML
0.25 INJECTION, SOLUTION SUBCUTANEOUS
Status: DISCONTINUED | OUTPATIENT
Start: 2018-07-13 | End: 2018-07-13 | Stop reason: HOSPADM

## 2018-07-13 RX ORDER — NIFEDIPINE 10 MG/1
10 CAPSULE ORAL EVERY 4 HOURS
Status: DISCONTINUED | OUTPATIENT
Start: 2018-07-13 | End: 2018-07-13 | Stop reason: HOSPADM

## 2018-07-13 RX ORDER — NIFEDIPINE 10 MG/1
10 CAPSULE ORAL EVERY 4 HOURS
Qty: 90 CAP | Refills: 2 | Status: ON HOLD | OUTPATIENT
Start: 2018-07-13 | End: 2018-08-26

## 2018-07-13 RX ADMIN — NIFEDIPINE 10 MG: 10 CAPSULE, LIQUID FILLED ORAL at 17:30

## 2018-07-13 RX ADMIN — NIFEDIPINE 10 MG: 10 CAPSULE, LIQUID FILLED ORAL at 14:11

## 2018-07-13 RX ADMIN — NIFEDIPINE 10 MG: 10 CAPSULE, LIQUID FILLED ORAL at 09:52

## 2018-07-13 RX ADMIN — NIFEDIPINE 10 MG: 10 CAPSULE, LIQUID FILLED ORAL at 06:31

## 2018-07-13 RX ADMIN — LEVOTHYROXINE SODIUM 125 MCG: 125 TABLET ORAL at 06:31

## 2018-07-13 ASSESSMENT — PAIN SCALES - GENERAL
PAINLEVEL_OUTOF10: 0
PAINLEVEL_OUTOF10: 5
PAINLEVEL_OUTOF10: 0

## 2018-07-13 ASSESSMENT — PATIENT HEALTH QUESTIONNAIRE - PHQ9
SUM OF ALL RESPONSES TO PHQ9 QUESTIONS 1 AND 2: 0
2. FEELING DOWN, DEPRESSED, IRRITABLE, OR HOPELESS: NOT AT ALL
1. LITTLE INTEREST OR PLEASURE IN DOING THINGS: NOT AT ALL

## 2018-07-13 NOTE — PROGRESS NOTES
[facility] NOTE:  31W6D TODAY   LABOR WITH CERVICAL CHANGE  Lisa did well after the magnesium was d/c'd and she was started on nifedipine 10 mg q 6 hours.  She tolerated the nifedipine except for the usual initial headache that most patients have initially after starting nifedipine.  However, this afternoon she did start having contractions again showing up on monitor q2-4 minutes and she told the nurse she was feeling them.  Dr. Orta gave her one dose of sc terbutaline and increased her nifedipine to 20 mg q 6 hours.  Currently she is not having many contractions.  She can possibly go home tomorrow but most likely in 2 days.  Depending on whether her BP's aren't too low on the nifedipine.

## 2018-07-13 NOTE — CARE PLAN
Problem: Pain  Goal: Alleviation of Pain or a reduction in pain to the patient's comfort goal  Outcome: PROGRESSING AS EXPECTED  Pt denies discomfort at this time.

## 2018-07-13 NOTE — CARE PLAN
Problem: Risk for Infection, Impaired Wound Healing  Goal: Remain free from signs and symptoms of infection  Outcome: PROGRESSING AS EXPECTED  Pt exhibits no signs of infection at this time. Is afebrile.

## 2018-07-13 NOTE — PROGRESS NOTES
"0700 Report rcvd from Temi RN, at bedside. POC discussed, pt care assumed. Pt found to be resting. Pt states she is having some cramping. She reports being more crampy than usual. Assessment done. No needs at this time.   0740 Dr. Pinto at bedside. SVE 2/75/0 no change from arrival. Changed nifedipine order to q 4hours. Will observe pt today but plan is to discharge to home if she is stable on the new dose.  1000 Nifedipine given. Pt has no needs at this time.   1200 Pt eating lunch. No needs at this time.   1400 Pt resting comfortably.   1615 Pt states that her she has not been as much \"tightening\" during this hour. She states that it is generally worse when she moves and she has not been moving as much lately. Called Dr. Pinto and left a message.   1700 Dr. Pinto returned call, updated. Okay to discharge to home.   1720 Discharge instructions given, including stressing the need for bedrest. Pt states understanding. Prescription for nifedipine given. IV discontinued. Questions answered. Next dose of nifedipine given before discharge. Pt discharged in stable condition in wheelchair with Mekhi (FOB).      "

## 2018-07-13 NOTE — PROGRESS NOTES
1900- Assumed care of pt, SBAR received. POC discussed with pt. Pt states she isn't feeling any UC's. Abd soft and non-tender to touch.     2000- Irritability noted noted with on fetal heart tracing. Abd soft and pt states she doesn't feel any cramping.     2100- Irritability on fetal heart tracing. Abd soft and pt states she doesn't feel any cramping.     2200- Irregular UC's noted on fetal heart tracing. Abd soft and nontender to touch. Pt states she doesn't feel any cramping.     2300- UC's Q 2-4 mins  noted to fetal heart tracing. Pt denies feeling them. Terbutaline SQ given per orders.     0030- Dr. Casas notified of pt status regarding UC's and measures taken. No new orders at this time. Reviewed fetal heart tracing in unit.    0700- SBAR endorsed to COLLEEN Kirby RN.

## 2018-07-17 ENCOUNTER — APPOINTMENT (OUTPATIENT)
Dept: NEUROLOGY | Facility: MEDICAL CENTER | Age: 38
End: 2018-07-17
Payer: COMMERCIAL

## 2018-07-24 NOTE — DISCHARGE SUMMARY
DATE OF ADMISSION:  07/10/2018    DATE OF DISCHARGE:  2018    ADMITTING DIAGNOSES:  1.  Intrauterine pregnancy at 32 weeks.  2.   labor.  3.  Advanced maternal age.  4.  Hypothyroidism.  5.  Interstitial cystitis.  6.  Irritable bowel syndrome.  7.  Rh negative status post RhoGAM at 28 weeks.    PROCEDURES:  1.  Admission to labor and delivery.  2.  GBS culture.  3.  Urine culture.  4.  Betamethasone for steroid benefit.  5.  Magnesium sulfate for  labor.  6.  Nifedipine.    HOSPITAL COURSE:  The patient is a 37-year-old  1, para 0 that was   admitted at 32 weeks on 07/10/2018 with  labor.  She was 2 cm dilated.    She was started on magnesium sulfate.  She was started on penicillin for GBS   prophylaxis and betamethasone for fetal lung maturity.  The patient became   quiescent.  On hospital day #1, in the morning, she was having shortness of   breath and blurry vision.  The magnesium sulfate was decreased from 2 g an   hour to 1.5 g an hour.  The patient received her second dose of betamethasone.    Patient's labs were stable.  Her fetal fibronectin came back negative.  Her   GBS came back negative.  Her magnesium sulfate was stable at 6.9.  H and H was   11.3 and 13.9, platelets were 149.  Dr. Tolbert [facility] was consulted and she   agreed with the plan.  She did decrease the magnesium sulfate to 0.5 g per   hour after she had received a second dose of betamethasone and then she   started the patient on nifedipine 10 mg p.o. q. 6 hours.  On 2018, the   patient slept most of the night, she only felt two contractions.  She was   stable off of the magnesium sulfate and was tolerating nifedipine without any   problems.  She was not jessica and not having any vaginal bleeding.  Her   vitals were stable.  Fetal heart tones were in the 110s and category 1.  No   decelerations and, on 2018, patient remained stable, she had had a run   of contractions during the night and was  given 1 dose of terbutaline, but by   the morning, she was not feeling anything.  I performed a pelvic exam and her   cervix was unchanged, it was still the same as upon admission, 2 cm dilated,   75% effaced, and 0 station and the fetal status was reactive and reassuring.    Therefore, at 32 weeks and 3 days, patient was discharged home,   hemodynamically stable.  She was still pregnant and she was to continue with   the nifedipine 10 mg q. 6 hours.  The penicillin had been discontinued since   she was GBS negative.  Patient was to see me back in a week and she was to   keep her appointment with Dr. Tolbert in 2 weeks.   labor warnings   were given.  Patient was discharged home pregnant and stable.       ____________________________________     TEODORO DASH MD    [facility]    DD:  2018 17:53:34  DT:  2018 20:18:26    D#:  [id number]  Job#:  [id number]    cc: NATHANIEL TOLBERT MD

## 2018-07-26 ENCOUNTER — APPOINTMENT (OUTPATIENT)
Dept: PHYSICAL THERAPY | Facility: REHABILITATION | Age: 38
End: 2018-07-26
Attending: NURSE PRACTITIONER
Payer: COMMERCIAL

## 2018-08-01 ENCOUNTER — APPOINTMENT (OUTPATIENT)
Dept: PHYSICAL THERAPY | Facility: REHABILITATION | Age: 38
End: 2018-08-01
Attending: NURSE PRACTITIONER
Payer: COMMERCIAL

## 2018-08-08 ENCOUNTER — APPOINTMENT (OUTPATIENT)
Dept: PHYSICAL THERAPY | Facility: REHABILITATION | Age: 38
End: 2018-08-08
Attending: NURSE PRACTITIONER
Payer: COMMERCIAL

## 2018-08-15 ENCOUNTER — APPOINTMENT (OUTPATIENT)
Dept: PHYSICAL THERAPY | Facility: REHABILITATION | Age: 38
End: 2018-08-15
Attending: NURSE PRACTITIONER
Payer: COMMERCIAL

## 2018-08-20 ENCOUNTER — TELEPHONE (OUTPATIENT)
Dept: PHYSICAL THERAPY | Facility: REHABILITATION | Age: 38
End: 2018-08-20

## 2018-08-20 NOTE — OP THERAPY DISCHARGE SUMMARY
Outpatient Physical Therapy  DISCHARGE SUMMARY NOTE      [address]  [address]  [place] [address]  Phone:  [phone]  Fax:  [phone]    Date of Visit: 08/20/2018    Patient: Lisa Arevalo  YOB: 1980  MRN: [id number]     Referring Provider: ASHLEE Jackson   Referring Diagnosis Bilateral low back pain with sciatica         Functional Limitation G-Codes and Severity Modifiers      Goal:     Discharge:         Your patient is being discharged from Physical Therapy with the following comments:   · Goals not met    Comments:  Patient seen for  9 visits and failed to show for additional follow up.. Patient is able to walk 50 min and back symptoms are intermittent with sitting .  Patient c/o increased pelvic/sacral pain and pressure  Recommended referral to Pelvic floor PT to address pregnancy related symptoms.    Limitations Remaining:  Please see Treatment notes 4/30/2018    Recommendations:  D/C    Jo Ann Braxton PT, MSPT    Date: 8/20/2018

## 2018-08-22 ENCOUNTER — APPOINTMENT (OUTPATIENT)
Dept: PHYSICAL THERAPY | Facility: REHABILITATION | Age: 38
End: 2018-08-22
Attending: NURSE PRACTITIONER
Payer: COMMERCIAL

## 2018-08-24 ENCOUNTER — HOSPITAL ENCOUNTER (INPATIENT)
Facility: MEDICAL CENTER | Age: 38
LOS: 2 days | End: 2018-08-26
Attending: OBSTETRICS & GYNECOLOGY | Admitting: OBSTETRICS & GYNECOLOGY
Payer: COMMERCIAL

## 2018-08-24 DIAGNOSIS — G89.18 PAIN FOLLOWING SURGERY OR PROCEDURE: ICD-10-CM

## 2018-08-24 LAB
BASOPHILS # BLD AUTO: 0.4 % (ref 0–1.8)
BASOPHILS # BLD: 0.05 K/UL (ref 0–0.12)
EOSINOPHIL # BLD AUTO: 0.11 K/UL (ref 0–0.51)
EOSINOPHIL NFR BLD: 1 % (ref 0–6.9)
ERYTHROCYTE [DISTWIDTH] IN BLOOD BY AUTOMATED COUNT: 44.7 FL (ref 35.9–50)
HCT VFR BLD AUTO: 43.6 % (ref 37–47)
HGB BLD-MCNC: 15.4 G/DL (ref 12–16)
HOLDING TUBE BB 8507: NORMAL
IMM GRANULOCYTES # BLD AUTO: 0.08 K/UL (ref 0–0.11)
IMM GRANULOCYTES NFR BLD AUTO: 0.7 % (ref 0–0.9)
LYMPHOCYTES # BLD AUTO: 2.14 K/UL (ref 1–4.8)
LYMPHOCYTES NFR BLD: 18.9 % (ref 22–41)
MCH RBC QN AUTO: 34.8 PG (ref 27–33)
MCHC RBC AUTO-ENTMCNC: 35.3 G/DL (ref 33.6–35)
MCV RBC AUTO: 98.4 FL (ref 81.4–97.8)
MONOCYTES # BLD AUTO: 0.62 K/UL (ref 0–0.85)
MONOCYTES NFR BLD AUTO: 5.5 % (ref 0–13.4)
NEUTROPHILS # BLD AUTO: 8.33 K/UL (ref 2–7.15)
NEUTROPHILS NFR BLD: 73.5 % (ref 44–72)
NRBC # BLD AUTO: 0 K/UL
NRBC BLD-RTO: 0 /100 WBC
PLATELET # BLD AUTO: 155 K/UL (ref 164–446)
PMV BLD AUTO: 11.5 FL (ref 9–12.9)
RBC # BLD AUTO: 4.43 M/UL (ref 4.2–5.4)
WBC # BLD AUTO: 11.3 K/UL (ref 4.8–10.8)

## 2018-08-24 PROCEDURE — 700111 HCHG RX REV CODE 636 W/ 250 OVERRIDE (IP): Performed by: OBSTETRICS & GYNECOLOGY

## 2018-08-24 PROCEDURE — 700111 HCHG RX REV CODE 636 W/ 250 OVERRIDE (IP)

## 2018-08-24 PROCEDURE — 85025 COMPLETE CBC W/AUTO DIFF WBC: CPT

## 2018-08-24 PROCEDURE — 10907ZC DRAINAGE OF AMNIOTIC FLUID, THERAPEUTIC FROM PRODUCTS OF CONCEPTION, VIA NATURAL OR ARTIFICIAL OPENING: ICD-10-PCS | Performed by: OBSTETRICS & GYNECOLOGY

## 2018-08-24 PROCEDURE — 10H07YZ INSERTION OF OTHER DEVICE INTO PRODUCTS OF CONCEPTION, VIA NATURAL OR ARTIFICIAL OPENING: ICD-10-PCS | Performed by: OBSTETRICS & GYNECOLOGY

## 2018-08-24 PROCEDURE — 770002 HCHG ROOM/CARE - OB PRIVATE (112)

## 2018-08-24 PROCEDURE — 700105 HCHG RX REV CODE 258

## 2018-08-24 RX ORDER — ROPIVACAINE HYDROCHLORIDE 2 MG/ML
INJECTION, SOLUTION EPIDURAL; INFILTRATION; PERINEURAL
Status: COMPLETED
Start: 2018-08-24 | End: 2018-08-24

## 2018-08-24 RX ORDER — SODIUM CHLORIDE, SODIUM LACTATE, POTASSIUM CHLORIDE, CALCIUM CHLORIDE 600; 310; 30; 20 MG/100ML; MG/100ML; MG/100ML; MG/100ML
INJECTION, SOLUTION INTRAVENOUS
Status: COMPLETED
Start: 2018-08-24 | End: 2018-08-24

## 2018-08-24 RX ORDER — METHYLERGONOVINE MALEATE 0.2 MG/ML
0.2 INJECTION INTRAVENOUS
Status: DISCONTINUED | OUTPATIENT
Start: 2018-08-24 | End: 2018-08-25 | Stop reason: HOSPADM

## 2018-08-24 RX ORDER — SODIUM CHLORIDE, SODIUM LACTATE, POTASSIUM CHLORIDE, CALCIUM CHLORIDE 600; 310; 30; 20 MG/100ML; MG/100ML; MG/100ML; MG/100ML
INJECTION, SOLUTION INTRAVENOUS
Status: COMPLETED
Start: 2018-08-24 | End: 2018-08-25

## 2018-08-24 RX ORDER — ALUMINA, MAGNESIA, AND SIMETHICONE 2400; 2400; 240 MG/30ML; MG/30ML; MG/30ML
30 SUSPENSION ORAL EVERY 6 HOURS PRN
Status: DISCONTINUED | OUTPATIENT
Start: 2018-08-24 | End: 2018-08-25 | Stop reason: HOSPADM

## 2018-08-24 RX ORDER — MISOPROSTOL 200 UG/1
800 TABLET ORAL
Status: COMPLETED | OUTPATIENT
Start: 2018-08-24 | End: 2018-08-25

## 2018-08-24 RX ADMIN — SODIUM CHLORIDE, POTASSIUM CHLORIDE, SODIUM LACTATE AND CALCIUM CHLORIDE 1000 ML: 600; 310; 30; 20 INJECTION, SOLUTION INTRAVENOUS at 22:46

## 2018-08-24 RX ADMIN — ROPIVACAINE HYDROCHLORIDE 100 ML: 2 INJECTION, SOLUTION EPIDURAL; INFILTRATION at 19:28

## 2018-08-24 RX ADMIN — Medication 1 MILLI-UNITS/MIN: at 22:05

## 2018-08-24 RX ADMIN — SODIUM CHLORIDE, POTASSIUM CHLORIDE, SODIUM LACTATE AND CALCIUM CHLORIDE 1000 ML: 600; 310; 30; 20 INJECTION, SOLUTION INTRAVENOUS at 18:40

## 2018-08-24 RX ADMIN — SODIUM CHLORIDE, POTASSIUM CHLORIDE, SODIUM LACTATE AND CALCIUM CHLORIDE 1000 ML: 600; 310; 30; 20 INJECTION, SOLUTION INTRAVENOUS at 19:28

## 2018-08-24 ASSESSMENT — LIFESTYLE VARIABLES
EVER_SMOKED: NEVER
ALCOHOL_USE: NO

## 2018-08-24 ASSESSMENT — PATIENT HEALTH QUESTIONNAIRE - PHQ9
2. FEELING DOWN, DEPRESSED, IRRITABLE, OR HOPELESS: NOT AT ALL
SUM OF ALL RESPONSES TO PHQ9 QUESTIONS 1 AND 2: 0
1. LITTLE INTEREST OR PLEASURE IN DOING THINGS: NOT AT ALL

## 2018-08-25 LAB
ERYTHROCYTE [DISTWIDTH] IN BLOOD BY AUTOMATED COUNT: 44.2 FL (ref 35.9–50)
HCT VFR BLD AUTO: 33 % (ref 37–47)
HGB BLD-MCNC: 11.7 G/DL (ref 12–16)
MCH RBC QN AUTO: 32.9 PG (ref 27–33)
MCHC RBC AUTO-ENTMCNC: 33.7 G/DL (ref 33.6–35)
MCV RBC AUTO: 97.7 FL (ref 81.4–97.8)
NUMBER OF RH DOSES IND 8505RD: NORMAL
PLATELET # BLD AUTO: 139 K/UL (ref 164–446)
PMV BLD AUTO: 10.7 FL (ref 9–12.9)
RBC # BLD AUTO: 3.46 M/UL (ref 4.2–5.4)
WBC # BLD AUTO: 13.5 K/UL (ref 4.8–10.8)

## 2018-08-25 PROCEDURE — 59409 OBSTETRICAL CARE: CPT

## 2018-08-25 PROCEDURE — 700102 HCHG RX REV CODE 250 W/ 637 OVERRIDE(OP): Performed by: OBSTETRICS & GYNECOLOGY

## 2018-08-25 PROCEDURE — 85027 COMPLETE CBC AUTOMATED: CPT

## 2018-08-25 PROCEDURE — 700111 HCHG RX REV CODE 636 W/ 250 OVERRIDE (IP): Performed by: OBSTETRICS & GYNECOLOGY

## 2018-08-25 PROCEDURE — 700112 HCHG RX REV CODE 229: Performed by: OBSTETRICS & GYNECOLOGY

## 2018-08-25 PROCEDURE — 0KQM0ZZ REPAIR PERINEUM MUSCLE, OPEN APPROACH: ICD-10-PCS | Performed by: OBSTETRICS & GYNECOLOGY

## 2018-08-25 PROCEDURE — 770002 HCHG ROOM/CARE - OB PRIVATE (112)

## 2018-08-25 PROCEDURE — 36415 COLL VENOUS BLD VENIPUNCTURE: CPT

## 2018-08-25 PROCEDURE — A9270 NON-COVERED ITEM OR SERVICE: HCPCS | Performed by: OBSTETRICS & GYNECOLOGY

## 2018-08-25 PROCEDURE — 700111 HCHG RX REV CODE 636 W/ 250 OVERRIDE (IP)

## 2018-08-25 PROCEDURE — 304965 HCHG RECOVERY SERVICES

## 2018-08-25 PROCEDURE — 303615 HCHG EPIDURAL/SPINAL ANESTHESIA FOR LABOR

## 2018-08-25 RX ORDER — OXYTOCIN 10 [USP'U]/ML
10 INJECTION, SOLUTION INTRAMUSCULAR; INTRAVENOUS ONCE
Status: ACTIVE | OUTPATIENT
Start: 2018-08-25 | End: 2018-08-26

## 2018-08-25 RX ORDER — OXYCODONE HYDROCHLORIDE AND ACETAMINOPHEN 5; 325 MG/1; MG/1
2 TABLET ORAL EVERY 4 HOURS PRN
Status: DISCONTINUED | OUTPATIENT
Start: 2018-08-25 | End: 2018-08-26 | Stop reason: HOSPADM

## 2018-08-25 RX ORDER — MISOPROSTOL 200 UG/1
1000 TABLET ORAL PRN
Status: DISCONTINUED | OUTPATIENT
Start: 2018-08-25 | End: 2018-08-26 | Stop reason: HOSPADM

## 2018-08-25 RX ORDER — OXYCODONE HYDROCHLORIDE AND ACETAMINOPHEN 5; 325 MG/1; MG/1
1 TABLET ORAL EVERY 4 HOURS PRN
Status: DISCONTINUED | OUTPATIENT
Start: 2018-08-25 | End: 2018-08-26 | Stop reason: HOSPADM

## 2018-08-25 RX ORDER — LEVOTHYROXINE SODIUM 0.12 MG/1
125 TABLET ORAL
Status: DISCONTINUED | OUTPATIENT
Start: 2018-08-25 | End: 2018-08-26 | Stop reason: HOSPADM

## 2018-08-25 RX ORDER — DOCUSATE SODIUM 100 MG/1
100 CAPSULE, LIQUID FILLED ORAL 2 TIMES DAILY PRN
Status: DISCONTINUED | OUTPATIENT
Start: 2018-08-25 | End: 2018-08-26 | Stop reason: HOSPADM

## 2018-08-25 RX ORDER — ONDANSETRON 2 MG/ML
4 INJECTION INTRAMUSCULAR; INTRAVENOUS EVERY 6 HOURS PRN
Status: DISCONTINUED | OUTPATIENT
Start: 2018-08-25 | End: 2018-08-26 | Stop reason: HOSPADM

## 2018-08-25 RX ORDER — IBUPROFEN 600 MG/1
600 TABLET ORAL EVERY 6 HOURS PRN
Status: DISCONTINUED | OUTPATIENT
Start: 2018-08-25 | End: 2018-08-26 | Stop reason: HOSPADM

## 2018-08-25 RX ORDER — METHYLERGONOVINE MALEATE 0.2 MG/ML
0.2 INJECTION INTRAVENOUS PRN
Status: DISCONTINUED | OUTPATIENT
Start: 2018-08-25 | End: 2018-08-26 | Stop reason: HOSPADM

## 2018-08-25 RX ORDER — ONDANSETRON 2 MG/ML
4 INJECTION INTRAMUSCULAR; INTRAVENOUS EVERY 6 HOURS PRN
Status: DISCONTINUED | OUTPATIENT
Start: 2018-08-25 | End: 2018-08-25

## 2018-08-25 RX ORDER — HYDROCODONE BITARTRATE AND ACETAMINOPHEN 5; 325 MG/1; MG/1
1 TABLET ORAL EVERY 4 HOURS PRN
Status: DISCONTINUED | OUTPATIENT
Start: 2018-08-25 | End: 2018-08-26 | Stop reason: HOSPADM

## 2018-08-25 RX ORDER — VITAMIN A ACETATE, BETA CAROTENE, ASCORBIC ACID, CHOLECALCIFEROL, .ALPHA.-TOCOPHEROL ACETATE, DL-, THIAMINE MONONITRATE, RIBOFLAVIN, NIACINAMIDE, PYRIDOXINE HYDROCHLORIDE, FOLIC ACID, CYANOCOBALAMIN, CALCIUM CARBONATE, FERROUS FUMARATE, ZINC OXIDE, CUPRIC OXIDE 3080; 12; 120; 400; 1; 1.84; 3; 20; 22; 920; 25; 200; 27; 10; 2 [IU]/1; UG/1; MG/1; [IU]/1; MG/1; MG/1; MG/1; MG/1; MG/1; [IU]/1; MG/1; MG/1; MG/1; MG/1; MG/1
1 TABLET, FILM COATED ORAL EVERY MORNING
Status: DISCONTINUED | OUTPATIENT
Start: 2018-08-26 | End: 2018-08-26 | Stop reason: HOSPADM

## 2018-08-25 RX ORDER — ONDANSETRON 4 MG/1
4 TABLET, ORALLY DISINTEGRATING ORAL EVERY 6 HOURS PRN
Status: DISCONTINUED | OUTPATIENT
Start: 2018-08-25 | End: 2018-08-26 | Stop reason: HOSPADM

## 2018-08-25 RX ORDER — ACETAMINOPHEN 325 MG/1
325 TABLET ORAL EVERY 4 HOURS PRN
Status: DISCONTINUED | OUTPATIENT
Start: 2018-08-25 | End: 2018-08-25

## 2018-08-25 RX ORDER — LIDOCAINE HYDROCHLORIDE 10 MG/ML
INJECTION, SOLUTION EPIDURAL; INFILTRATION; INTRACAUDAL; PERINEURAL
Status: COMPLETED
Start: 2018-08-25 | End: 2018-08-25

## 2018-08-25 RX ORDER — HYDROCODONE BITARTRATE AND ACETAMINOPHEN 5; 325 MG/1; MG/1
2 TABLET ORAL EVERY 4 HOURS PRN
Status: DISCONTINUED | OUTPATIENT
Start: 2018-08-25 | End: 2018-08-26 | Stop reason: HOSPADM

## 2018-08-25 RX ORDER — CARBOPROST TROMETHAMINE 250 UG/ML
250 INJECTION, SOLUTION INTRAMUSCULAR PRN
Status: DISCONTINUED | OUTPATIENT
Start: 2018-08-25 | End: 2018-08-26 | Stop reason: HOSPADM

## 2018-08-25 RX ORDER — ACETAMINOPHEN 325 MG/1
325 TABLET ORAL EVERY 4 HOURS PRN
Status: DISCONTINUED | OUTPATIENT
Start: 2018-08-25 | End: 2018-08-26 | Stop reason: HOSPADM

## 2018-08-25 RX ORDER — IBUPROFEN 600 MG/1
600 TABLET ORAL EVERY 6 HOURS PRN
Status: DISCONTINUED | OUTPATIENT
Start: 2018-08-25 | End: 2018-08-25

## 2018-08-25 RX ADMIN — LIDOCAINE HYDROCHLORIDE 30 ML: 10 INJECTION, SOLUTION EPIDURAL; INFILTRATION; INTRACAUDAL; PERINEURAL at 01:39

## 2018-08-25 RX ADMIN — DOCUSATE SODIUM 100 MG: 100 CAPSULE, LIQUID FILLED ORAL at 19:16

## 2018-08-25 RX ADMIN — MISOPROSTOL 1000 MCG: 200 TABLET ORAL at 01:37

## 2018-08-25 RX ADMIN — Medication 2000 ML/HR: at 01:27

## 2018-08-25 RX ADMIN — IBUPROFEN 600 MG: 600 TABLET, FILM COATED ORAL at 19:16

## 2018-08-25 RX ADMIN — IBUPROFEN 600 MG: 600 TABLET, FILM COATED ORAL at 12:16

## 2018-08-25 RX ADMIN — LEVOTHYROXINE SODIUM 125 MCG: 125 TABLET ORAL at 12:16

## 2018-08-25 RX ADMIN — IBUPROFEN 600 MG: 600 TABLET, FILM COATED ORAL at 05:00

## 2018-08-25 RX ADMIN — Medication 125 ML/HR: at 03:33

## 2018-08-25 ASSESSMENT — PAIN SCALES - GENERAL
PAINLEVEL_OUTOF10: 1
PAINLEVEL_OUTOF10: 0
PAINLEVEL_OUTOF10: 5
PAINLEVEL_OUTOF10: 1
PAINLEVEL_OUTOF10: 4
PAINLEVEL_OUTOF10: 1
PAINLEVEL_OUTOF10: 4

## 2018-08-25 NOTE — PROGRESS NOTES
OB ADMISSION H AND P    ADMISSION DIAGNOSIS:     IUP AT 38W0D.  GBS NEGATIVE.  ACTIVE LABOR.    PLAN:    OK FOR EPIDURAL.  AROM IF NO LABOR PROGRESS.  ANTICIPATE .    DICTATED.

## 2018-08-25 NOTE — DELIVERY NOTE
LABOR AND DELIVERY    DELIVERY SUMMARY    STAGE I LABOR:    The patient was admitted as a 36 yo  at 38w0d who presented to labor and delivery in active labor.  At the time of admission at 1730 on 18, her cervix was 6/100%/0.  She received an epidural for labor analgesia.  She is GBS negative.  She underwent AROM clear fluid at 2138 and progressed to complete at 2305.  Category I-II tracing during stage I labor.      STAGE II LABOR: 2 HOURS 31 MINUTES    I was present for a  of a viable male infant at 0126.  The vertex of the infant delivered without difficulty.  No nuchal cord was palpated and no shoulder dystocia was encountered.  The remainder of the infant delivered quickly without difficulty.  A cord was wrapped around the infant leg.  The infant was placed on the mother's abdomen.  The infant's mouth and nose were bulb suctioned.  Delayed cord clamping was performed and the cord was clamped and cut.  A segment of cord was clamped and cut for a cord gas but this was held as the APGAR scores were 8 at one minute and 9 at five minutes.    The patient received IV pitocin after delivery of the infant.    The infant weight is pending.    STAGE III LABOR: 28 MINUTES    The placenta delivered intact with a 3-vessel cord at 0144.    The patient's perineum was examined and found to have sustained a deep and wide 2nd degree perineal laceration which was repaired in the usual fashion after the instillation of 1% lidocaine without epinephrine.    Bimanual uterine massage was performed and small clots were removed from the lower uterine segment.      The patient received 1000 mcg of cytotec per rectum x 1 after the above examination.  No suture material was palpated in the patient rectum.    EBL - 400 cc

## 2018-08-25 NOTE — PROGRESS NOTES
"Mother states she has been attempting to BF but baby is sleepy, mother also states she is \"apprehensive\" about BF as she is very \"sensitive\" and cannot even tolerate breast exams, assured her we will support whatever she chooses to do, assured her that if she cannot tolerate BF and wants to formula feed that will be fine, also assured her she could pump if that is more tolerable, discussed normal course of BF the first few days, discussed expected feeding frequency and duration, encouraged [id number], assisted with BF attempt, baby sleepy, no feeding cues noted and no latch achieved, attempted to educated on HE but mother was unable to tolerate.    Educated on assistance available at [facility] after discharge, invited to BF [place], \"A New Beginning\" booklet provided.    Encouraged to call for assistance as needed    "

## 2018-08-25 NOTE — CARE PLAN
Problem: Alteration in comfort related to episiotomy, vaginal repair and/or after birth pains  Goal: Patient verbalizes acceptable pain level  Patient provided with ice, tucks, and spray. Patient medicated for pain per MAR.     Problem: Potential knowledge deficit related to lack of understanding of self and  care  Goal: Patient will verbalize understanding of self and infant care  Patient educated on feeding frequency, latch technique, and hand expression. Patient verbalizes understanding.

## 2018-08-25 NOTE — PROGRESS NOTES
36 yo   ED   GA 38-0  GBS neg    1730-Pt presents today c/o more regular, frequent UC's occurring about every 2-3 minutes. Pt also states she has had more bloody show and that is why she came in. Pt said she was dilated at 4-5 cm this AM at her office visit. Pt denies VB or LOF and confirms +FM. TOCO and EFM in place. FOB at bedside. SVE /0    5126-Dr. Casas phoned, updated on patient status as stated above, admit orders received.

## 2018-08-25 NOTE — H&P
DATE OF ADMISSION:  2018    ADMISSION DIAGNOSES:  1.  Intrauterine pregnancy at 38 weeks gestation.  2.  GBS negative.  3.  Active labor.    HISTORY OF PRESENT ILLNESS:  The patient is a 37-year-old  1, para 0 at   38 weeks' gestation who presents to labor and delivery in active labor.  The   patient was seen earlier in the office of Dr. Jennifer Pinto today and her   cervix was 4-5 cm dilated.  Today at the time of admission, her cervix was   found to be at 1730 hours, 6 cm, 100% effaced, 0 station.  The patient   received an epidural for labor analgesia and her cervix at 1947 hours was 7-8   cm, 90% effaced, -1 station.  The patient underwent artificial rupture of   membranes of clear fluid at 9:45 p.m. and an IUPC was placed.    Prenatal care with Dr. Jennifer Pinto.  I do not have a copy of her   prenatal at this time for my review and as such I do not have a listing of her   ultrasounds or visits.    Prelude prenatal screen is negative.  Her trisomy 13, 18 and 21, male gender.    GBS negative on 2018.  One hour .    PRENATAL LABS:  Blood type O negative, antibody screen negative, rubella   immune, hepatitis B surface antigen negative, RPR nonreactive, HIV negative.    Urine culture, no growth.    PAST OB HISTORY:  She is a primigravida.    PAST MEDICAL HISTORY:  Hypothyroidism, migraine headaches, fibromyalgia,   anxiety.      PAST SURGICAL HISTORY:  No surgeries.    SOCIAL HISTORY:  She denies alcohol or drug of abuse.  Father of the baby's   name is Mekhi.    ALLERGIES:  INCLUDE ROCEPHIN, MACROBID, MINOCYCLINE, NEURONTIN, ROCEPHIN AND   METHIMAZOLE.    ISSUES THIS PREGNANCY:  History of  labor, magnesium sulfate and status   post Procardia.    PHYSICAL EXAMINATION:  VITAL SIGNS:  Her blood pressures 98/63 and pulse 104.  She is afebrile.  GENERAL:  Alert, awake and oriented x3, in no acute distress.  She is   comfortable with her epidural.  PELVIC:  Sterile vaginal exam.   At 9:45 p.m., her cervix was 8 cm, 90%   effaced, -1 station.  Fetal heart tones category 1 tracing.  Ambia, she is   jessica every 3-6 minutes.  Estimated fetal weight is 3100 g.  EXTREMITIES:  No clubbing, cyanosis or edema.    LABORATORY STUDIES:  White count 11.3, hemoglobin 15.4, hematocrit 43.6,   platelets 155; on 07/10/2018, platelets were 149.    ASSESSMENT AND PLAN:  A 37-year-old  1, para 0 at 38 weeks gestation   based upon her stated KATHY of 2018, who is in active labor who has   received an epidural for labor analgesia, who underwent artificial rupture of   membranes of clear fluid, had an IUPC placed and we will anticipate a normal   spontaneous vaginal delivery.       ____________________________________     SANTOS YUSUF MD    [facility]    DD:  2018 21:56:18  DT:  2018 22:50:54    D#:  [id number]  Job#:  [id number]    cc: TEODORO DASH MD

## 2018-08-25 NOTE — PROGRESS NOTES
190 Report received from MANUEL Stone RN at bedside and assumed care. POC discussed with pt and s/o and encouraged to state needs or questions at any time.     Dr. Wright at bedside to place epidural, pt tolerated well. Pt assisted with frequent position changes after epidural placement using pillows and peanut balls for support.     Call received from Dr. Casas, update given. Will perform SVE and call back.     SVE by RN 7-. Dr. Casas called and given update.     Dr. Casas at bedside, SVE by provider  . AROM clear fluid. IUPC placed.     Pt states feeling strong pressure. SVE by RN C/+2. Dr. Casas called and notified.     Pt started pushing. RN at bedside through delivery. Frequent position changes while pushing including left and right lateral, semi-fowlers, tug-of-war, and hands and knees positions.    0116 Dr. Casas at bedside through delivery.    0126  of viable male infant apgars 8/9    0144 delivery of placenta S/I    0420 Pt up to the bathroom and voided minimal amount. Pt states she still has the urge to urinate but cannot.    0440 Pt transferred to  via wheelchair with infant in arms. Pt and infant stable.    0445 Report given to Suri RN at bedside. Bands verified and cuddles active.

## 2018-08-25 NOTE — DELIVERY NOTE
DATE OF SERVICE:  2018    Stage I labor:  The patient was admitted as a 37-year-old  1, para 0 at   38+0 weeks gestation who presented to labor and delivery in active labor.  At   the time of the admission on 2018 at 1730 hours, her cervix was 6 cm   dilated, 100% effaced, 0 station.  The patient received an epidural for labor   analgesia.  She is noted to be GBS negative.  The patient underwent artificial   rupture of membranes of clear fluid at 2138 and progressed to completely   dilated at 2305.  Category 1-2 tracing during stage I labor.    Stage II labor:  2 hours 31 minutes.    I was present for normal spontaneous vaginal delivery of a viable male infant   at 0126 on 2018.  The vertex of the infant delivered without difficulty.    No nuchal cord was palpated and no shoulder dystocia was encountered.  The   remainder of the infant delivered quickly without difficulty.  A cord was   noted to be wrapped around the infant's leg.  The infant was placed on the   mother's abdomen.  The infant's mouth and nose were bulb suctioned.  Delayed   cord clamping was performed.  The cord was then clamped and cut.  A segment of   cord was clamped and cut for cord gas, but this was held as the Apgars scores   were 8 at 1 minute and 9 at 5 minutes.    The patient received IV Pitocin after delivery of the infant.  The infant's   weight is pending.    Stage III labor:  28 minutes.    The placenta delivered intact with 3-vessel cord at 0144.  The patient's   perineum was examined and she was found to have sustained a second-degree   perineal laceration, which was repaired in the usual fashion after the   instillation of 1% lidocaine without epinephrine in a several layer closure   with 2-0 Vicryl and 3-0 Vicryl.    Bimanual uterine massage was performed and small clots removed from the lower   uterine segment.    The patient received 1000 mcg of Cytotec per rectum x1 after the above   examination.  No  suture material was palpated in the patient's rectum.    ESTIMATED BLOOD LOSS:  400 mL       ____________________________________     SANTOS YUSUF MD    [facility]    DD:  08/25/2018 02:13:27  DT:  08/25/2018 04:15:42    D#:  [id number]  Job#:  [id number]    cc: SANJEEV THOMAS MD

## 2018-08-25 NOTE — DELIVERY NOTE
DATE OF SERVICE:  08/24/2018    ADDENDUM    Dictator is requesting CC to be sent to Dr. Jennifer Dash instead of Dr. Ale Orta.       ____________________________________     SANTOS YUSUF MD    [facility]    DD:  08/25/2018 02:14:15  DT:  08/25/2018 03:53:10    D#:  [id number]  Job#:  [id number]    cc: JENNIFER DASH MD

## 2018-08-25 NOTE — CARE PLAN
Problem: Pain  Goal: Alleviation of Pain or a reduction in pain to the patient's comfort goal  Outcome: PROGRESSING AS EXPECTED  Pt states feeling comfortable with epidural in place. Feels some pain in lower back with contractions but states that it is tolerable; will try position changes to increase comfort.    Problem: Risk for Infection, Impaired Wound Healing  Goal: Remain free from signs and symptoms of infection  Outcome: PROGRESSING AS EXPECTED  Pt is afebrile and free from s/s of infection at this time. Will continue to assess.

## 2018-08-25 NOTE — PROGRESS NOTES
A-long - so rhogam not needed.  Up to BR . Moderate rubra. Firm 1 below.  Voiding qs. Eating well.  Bonding well with baby.  Called dr. Casas for synthroid order.  Attempts to breast feed and baby is sleepy.

## 2018-08-25 NOTE — PROGRESS NOTES
"PPD #1 s/p  with repair of 2nd degree perineal laceration.    S:  Doing well.  Pain controlled with ibuprofen but she is having constant perineal pain.  She declines narcotics at this time.  She is working on breast feeding but having difficulties.  Moderate lochia.  Discussed kayleigh cares.    O:  Blood pressure 108/75, pulse 72, temperature 37 °C (98.6 °F), resp. rate 20, height 1.727 m (5' 8\"), weight 64.9 kg (143 lb), last menstrual period 2017, SpO2 95 %, currently breastfeeding.    Recent Labs      18   1815   WBC  11.3*   RBC  4.43   HEMOGLOBIN  15.4   HEMATOCRIT  43.6   MCV  98.4*   MCH  34.8*   RDW  44.7   PLATELETCT  155*   MPV  11.5   NEUTSPOLYS  73.50*   LYMPHOCYTES  18.90*   MONOCYTES  5.50   EOSINOPHILS  1.00   BASOPHILS  0.40     A, A, and O x 3 NAD    FF u/1    No c/c/e    A/P: PPD #1 s/p  with repair of 2nd degree perineal laceration.    1.  Ambulate TID.  2.  ADAT.  3.  Work on breast feeding.  4.  Kayleigh cares.  5.  Anticipate d/c tomorrow.  "

## 2018-08-26 VITALS
SYSTOLIC BLOOD PRESSURE: 102 MMHG | HEIGHT: 68 IN | BODY MASS INDEX: 21.67 KG/M2 | HEART RATE: 78 BPM | OXYGEN SATURATION: 95 % | DIASTOLIC BLOOD PRESSURE: 77 MMHG | TEMPERATURE: 98.5 F | RESPIRATION RATE: 18 BRPM | WEIGHT: 143 LBS

## 2018-08-26 PROCEDURE — 700102 HCHG RX REV CODE 250 W/ 637 OVERRIDE(OP): Performed by: OBSTETRICS & GYNECOLOGY

## 2018-08-26 PROCEDURE — A9270 NON-COVERED ITEM OR SERVICE: HCPCS | Performed by: OBSTETRICS & GYNECOLOGY

## 2018-08-26 RX ORDER — SENNA AND DOCUSATE SODIUM 50; 8.6 MG/1; MG/1
1 TABLET, FILM COATED ORAL 2 TIMES DAILY
Qty: 60 TAB | Refills: 1 | Status: SHIPPED | OUTPATIENT
Start: 2018-08-26 | End: 2019-02-28

## 2018-08-26 RX ORDER — HYDROCODONE BITARTRATE AND ACETAMINOPHEN 5; 325 MG/1; MG/1
1 TABLET ORAL EVERY 4 HOURS PRN
Qty: 15 TAB | Refills: 0 | Status: SHIPPED | OUTPATIENT
Start: 2018-08-26 | End: 2018-08-31

## 2018-08-26 RX ORDER — IBUPROFEN 600 MG/1
600 TABLET ORAL EVERY 6 HOURS PRN
Qty: 30 TAB | Refills: 3 | Status: SHIPPED | OUTPATIENT
Start: 2018-08-26 | End: 2019-02-28

## 2018-08-26 RX ADMIN — IBUPROFEN 600 MG: 600 TABLET, FILM COATED ORAL at 12:25

## 2018-08-26 RX ADMIN — IBUPROFEN 600 MG: 600 TABLET, FILM COATED ORAL at 06:15

## 2018-08-26 RX ADMIN — Medication 1 TABLET: at 06:13

## 2018-08-26 RX ADMIN — LEVOTHYROXINE SODIUM 125 MCG: 125 TABLET ORAL at 06:12

## 2018-08-26 ASSESSMENT — PAIN SCALES - GENERAL
PAINLEVEL_OUTOF10: 0
PAINLEVEL_OUTOF10: 5
PAINLEVEL_OUTOF10: 3
PAINLEVEL_OUTOF10: 5

## 2018-08-26 NOTE — PROGRESS NOTES
"PPD #2 s/p  with repair of 2nd degree perineal laceration.    S:  Doing ok.  Still having perineal pain.  Discussed sitz baths, continued use of tucks and spray, and use of narcotics and ibuprofen for pain control.  She desires discharge to home today.  She is working on breast feeding but states that the baby is having problems latching on.  Discussed the use of a nipple shield as well as hand expression after pumping breast milk.  Questions answered.    O:  Blood pressure 102/77, pulse 78, temperature 36.9 °C (98.5 °F), resp. rate 18, height 1.727 m (5' 8\"), weight 64.9 kg (143 lb), last menstrual period 2017, SpO2 95 %, currently breastfeeding.    Recent Labs      18   1815  18   2242   WBC  11.3*  13.5*   RBC  4.43  3.46*   HEMOGLOBIN  15.4  11.7*   HEMATOCRIT  43.6  33.0*   MCV  98.4*  97.7   MCH  34.8*  32.9   RDW  44.7  44.2   PLATELETCT  155*  139*   MPV  11.5  10.7   NEUTSPOLYS  73.50*   --    LYMPHOCYTES  18.90*   --    MONOCYTES  5.50   --    EOSINOPHILS  1.00   --    BASOPHILS  0.40   --      A, A, and O x 3 NAD    FF u/1     No c/ce    A/P: PPD #2 s/p  with repair of 2nd degree perineal laceration.    1. Discharge to home and follow up in 6 weeks for a postpartum examination.  2. Continue cares.  3. Work on breast feeding and attempt hand expression.  Continue prenatal vitamins while breast feeding.  "

## 2018-08-26 NOTE — DISCHARGE INSTRUCTIONS
POSTPARTUM DISCHARGE INSTRUCTIONS FOR MOM    YOB: 1980   Age: 37 y.o.               Admit Date: 2018     Discharge Date: 2018  Attending Doctor:  Jennifer Pinto M.D.                  Allergies:  Macrobid [nitrofurantoin monohydrate macrocrystals]; Minocycline; Neurontin [gabapentin]; Rocephin [ceftriaxone sodium]; Tapazole [thiamazole]; Gluten meal; and Propylthiouracil    Discharged to home by car. Discharged via wheelchair, hospital escort: Yes.  Special equipment needed: Not Applicable  Belongings with: Personal  Be sure to schedule a follow-up appointment with your primary care doctor or any specialists as instructed.     Discharge Plan:   Diet Plan: Discussed  Activity Level: Discussed  Confirmed Follow up Appointment: Appointment Scheduled  Confirmed Symptoms Management: Discussed  Medication Reconciliation Updated: Yes  Influenza Vaccine Indication: Patient Refuses    REASONS TO CALL YOUR OBSTETRICIAN:  1.   Persistent fever or shaking chills (Temperature higher than 100.4)  2.   Heavy bleeding (soaking more than 1 pad per hour); Passing clots  3.   Foul odor from vagina  4.   Mastitis (Breast infection; breast pain, chills, fever, redness)  5.   Urinary pain, burning or frequency  6.   Episiotomy infection  7.   Abdominal incision infection  8.   Severe depression longer than 24 hours    HAND WASHING  · Prior to handling the baby.  · Before breastfeeding or bottle feeding baby.  · After using the bathroom or changing the baby's diaper.    WOUND CARE  Ask your physician for additional care instructions.  In general:    ·  Incision:      · Keep clean and dry.    · Do NOT lift anything heavier than your baby for up to 6 weeks.    · There should not be any opening or pus.      VAGINAL CARE  · Nothing inside vagina for 6 weeks: no sexual intercourse, tampons or douching.  · Bleeding may continue for 2-4 weeks.  Amount may vary.    · Call your physician for heavy bleeding which  "means soaking more than 1 pad per hour    BIRTH CONTROL  · It is possible to become pregnant at any time after delivery and while breastfeeding.  · Plan to discuss a method of birth control with your physician at your follow up visit. visit.    DIET AND ELIMINATION  · Eating more fiber (bran cereal, fruits, and vegetables) and drinking plenty of fluids will help to avoid constipation.  · Urinary frequency after childbirth is normal.    POSTPARTUM BLUES  During the first few days after birth, you may experience a sense of the \"blues\" which may include impatience, irritability or even crying.  These feeling come and go quickly.  However, as many as 1 in 10 women experience emotional symptoms known as postpartum depression.    Postpartum depression:  May start as early as the second or third day after delivery or take several weeks or months to develop.  Symptoms of \"blues\" are present, but are more intense:  Crying spells; loss of appetite; feelings of hopelessness or loss of control; fear of touching the baby; over concern or no concern at all about the baby; little or no concern about your own appearance/caring for yourself; and/or inability to sleep or excessive sleeping.  Contact your physician if you are experiencing any of these symptoms.    Crisis Hotline:  · [place] Crisis Hotline:  0-390-CEGWOMA  Or 1-160.455.6453  · [place] Crisis Hotline:  1-124.134.5967  Or 578-415-5845    PREVENTING SHAKEN BABY:  If you are angry or stressed, PUT THE BABY IN THE CRIB, step away, take some deep breaths, and wait until you are calm to care for the baby.  DO NOT SHAKE THE BABY.  You are not alone, call a supporter for help.    · Crisis Call Center 24/7 crisis line 148-628-9480 or 1-154.240.6843  · You can also text them, text \"ANSWER\" to 866229    QUIT SMOKING/TOBACCO USE:  I understand the use of any tobacco products increases my chance of suffering from future heart disease and could cause other illnesses which may shorten " my life. Quitting the use of tobacco products is the single most important thing I can do to improve my health. For further information on smoking / tobacco cessation call a Toll Free Quit Line at 1-268.929.8253 (*National Cancer [place]) or 1-360.748.8650 (American Lung Association) or you can access the web based program at www.lungusa.org.    · [place] Tobacco Users Help Line:  (999) 758-9460       Toll Free: 1-693.811.4918  · Quit Tobacco Program [facility] (039)981-8843    DEPRESSION / SUICIDE RISK:  As you are discharged from this [facility], it is important to learn how to keep safe from harming yourself.    Recognize the warning signs:  · Abrupt changes in personality, positive or negative- including increase in energy   · Giving away possessions  · Change in eating patterns- significant weight changes-  positive or negative  · Change in sleeping patterns- unable to sleep or sleeping all the time   · Unwillingness or inability to communicate  · Depression  · Unusual sadness, discouragement and loneliness  · Talk of wanting to die  · Neglect of personal appearance   · Rebelliousness- reckless behavior  · Withdrawal from people/activities they love  · Confusion- inability to concentrate     If you or a loved one observes any of these behaviors or has concerns about self-harm, here's what you can do:  · Talk about it- your feelings and reasons for harming yourself  · Remove any means that you might use to hurt yourself (examples: pills, rope, extension cords, firearm)  · Get professional help from the community (Mental Health, Substance Abuse, psychological counseling)  · Do not be alone:Call your Safe Contact- someone whom you trust who will be there for you.  · Call your local CRISIS HOTLINE 688-8857 or 536-468-0347  · Call your local Children's Mobile Crisis Response Team [place] (571) 379-5246 or www.PneumRx  · Call the toll free National Suicide Prevention  Hotlines   · National Suicide Prevention Lifeline 115-040-DYUK (9178)  · National Hope Line Network 800-SUICIDE (655-8116)    DISCHARGE SURVEY:  Thank you for choosing [facility].  We hope we provided you with very good care.  You may be receiving a survey in the mail.  Please fill it out.  Your opinion is valuable to us.    ADDITIONAL EDUCATIONAL MATERIALS GIVEN TO PATIENT:        My signature on this form indicates that:  1.  I have reviewed and understand the above information  2.  My questions regarding this information have been answered to my satisfaction.  3.  I have formulated a plan with my discharge nurse to obtain my prescribed medication for home.

## 2018-08-26 NOTE — CARE PLAN
Problem: Altered physiologic condition related to immediate post-delivery state and potential for bleeding/hemorrhage  Goal: Patient physiologically stable as evidenced by normal lochia, palpable uterine involution and vital signs within normal limits  Outcome: PROGRESSING AS EXPECTED  Fundus is firm and 2 finger breadth below the umbilicus. Lochia is light. Vital signs are within normal limits.     Problem: Potential for postpartum infection related to presence of episiotomy/vaginal tear and/or uterine contamination  Goal: Patient will be absent from signs and symptoms of infection  Outcome: PROGRESSING AS EXPECTED  Patient remains afebrile. Educated on importance of hand washing

## 2018-08-26 NOTE — PROGRESS NOTES
"Mother states she began using breast pump during the night because BF was too painful to continue attempting, states that so far pumping is \"tolerable\", assured her we will support any decisions she makes, discussed pumping schedule, verified understanding of proper pump use and settings, educated on importance of frequent pumping for adequate milk production.    Plan:  Pump Q 3 for 15 minutes hours at least 8 times every 24 hours    Supplement volume guidelines provided and explained, parents aware they will need to use formula until breast milk production begins.    Encouraged to call for assistance as needed      "

## 2018-08-26 NOTE — PROGRESS NOTES
Pt discharged home with infant. Infant discharged home with parents.  Securely strapped in car seat.  Cuddles and clamp removed. ID bands verified.  Discharge instructions provided and pt verbalizes understanding and has no additional questions at this time.  RN transport provided to car.

## 2018-08-29 ENCOUNTER — APPOINTMENT (OUTPATIENT)
Dept: PHYSICAL THERAPY | Facility: REHABILITATION | Age: 38
End: 2018-08-29
Attending: NURSE PRACTITIONER
Payer: COMMERCIAL

## 2018-09-11 ENCOUNTER — TELEPHONE (OUTPATIENT)
Dept: MEDICAL GROUP | Facility: MEDICAL CENTER | Age: 38
End: 2018-09-11

## 2018-09-11 DIAGNOSIS — E89.0 S/P TOTAL THYROIDECTOMY: ICD-10-CM

## 2018-09-12 NOTE — TELEPHONE ENCOUNTER
Patient called and left message,    With her thyroid medication, pt is having really bad heart palpations. She is wondering if the dose is too high?? She just had baby and it had to be increased for pregnancy. Would like to get labs rechecked to see if she needs a smaller dose???      ZENIA: [phone]

## 2018-09-15 ENCOUNTER — HOSPITAL ENCOUNTER (OUTPATIENT)
Dept: LAB | Facility: MEDICAL CENTER | Age: 38
End: 2018-09-15
Attending: NURSE PRACTITIONER
Payer: COMMERCIAL

## 2018-09-15 LAB
T4 FREE SERPL-MCNC: 1.68 NG/DL (ref 0.53–1.43)
TSH SERPL DL<=0.005 MIU/L-ACNC: 0.04 UIU/ML (ref 0.38–5.33)

## 2018-09-15 PROCEDURE — 84443 ASSAY THYROID STIM HORMONE: CPT

## 2018-09-15 PROCEDURE — 84439 ASSAY OF FREE THYROXINE: CPT

## 2018-09-15 PROCEDURE — 36415 COLL VENOUS BLD VENIPUNCTURE: CPT

## 2018-10-04 ENCOUNTER — PATIENT MESSAGE (OUTPATIENT)
Dept: MEDICAL GROUP | Facility: MEDICAL CENTER | Age: 38
End: 2018-10-04

## 2018-10-04 DIAGNOSIS — E03.9 ACQUIRED HYPOTHYROIDISM: ICD-10-CM

## 2018-10-05 RX ORDER — LEVOTHYROXINE SODIUM 0.1 MG/1
100 TABLET ORAL
Qty: 90 TAB | Refills: 0 | Status: SHIPPED | OUTPATIENT
Start: 2018-10-05 | End: 2019-01-10 | Stop reason: SDUPTHER

## 2018-10-05 RX ORDER — LEVOTHYROXINE SODIUM 0.1 MG/1
100 TABLET ORAL
Qty: 90 TAB | Refills: 0 | Status: SHIPPED | OUTPATIENT
Start: 2018-10-05 | End: 2019-02-28

## 2018-10-05 NOTE — TELEPHONE ENCOUNTER
From: Lisa Arevalo  To: MASOOD Moody  Sent: 10/4/2018 1:57 PM PDT  Subject: Test Result Question    Torrey Carlos, yes I’m currently taking 1.25 mcg of synthroid and was wondering if I could get a new RX for the 100mcg of the generic of synthroid? I was taking the brand synthroid for my pregnancy and it’s expensive so the generic would be ok for now if that’s ok? I have reduced the amount to 100mcg since, but still having heart palpitations so I wanted to know when I should redraw?   Thank you!

## 2019-01-11 RX ORDER — LEVOTHYROXINE SODIUM 0.1 MG/1
TABLET ORAL
Qty: 30 TAB | Refills: 0 | Status: SHIPPED | OUTPATIENT
Start: 2019-01-11 | End: 2019-02-22 | Stop reason: SDUPTHER

## 2019-02-19 RX ORDER — LEVOTHYROXINE SODIUM 0.1 MG/1
TABLET ORAL
Qty: 30 TAB | Refills: 0 | Status: CANCELLED | OUTPATIENT
Start: 2019-02-19

## 2019-02-19 NOTE — TELEPHONE ENCOUNTER
Please contact pt and ask if she has completed thyroid labs as ordered in October 2018.  She also needs an appointment please

## 2019-02-22 RX ORDER — LEVOTHYROXINE SODIUM 0.1 MG/1
100 TABLET ORAL EVERY MORNING
Qty: 90 TAB | Refills: 0 | Status: SHIPPED | OUTPATIENT
Start: 2019-02-22 | End: 2019-05-23 | Stop reason: SDUPTHER

## 2019-02-22 NOTE — TELEPHONE ENCOUNTER
Patient just called and is trying to get a refill for this medication, I do not know what happened but I never saw this encounter on 02/19. Patient has not gotten labs done she will go get that done early next week and is also scheduled for an annual appointment next week. Patient is asking for temp refill until she is seen.

## 2019-02-23 ENCOUNTER — HOSPITAL ENCOUNTER (OUTPATIENT)
Dept: LAB | Facility: MEDICAL CENTER | Age: 39
End: 2019-02-23
Attending: NURSE PRACTITIONER
Payer: COMMERCIAL

## 2019-02-23 LAB
T4 FREE SERPL-MCNC: 1.04 NG/DL (ref 0.53–1.43)
TSH SERPL DL<=0.005 MIU/L-ACNC: 2.56 UIU/ML (ref 0.38–5.33)

## 2019-02-23 PROCEDURE — 36415 COLL VENOUS BLD VENIPUNCTURE: CPT

## 2019-02-23 PROCEDURE — 84439 ASSAY OF FREE THYROXINE: CPT

## 2019-02-23 PROCEDURE — 84443 ASSAY THYROID STIM HORMONE: CPT

## 2019-02-28 ENCOUNTER — OFFICE VISIT (OUTPATIENT)
Dept: MEDICAL GROUP | Facility: MEDICAL CENTER | Age: 39
End: 2019-02-28
Payer: COMMERCIAL

## 2019-02-28 VITALS
DIASTOLIC BLOOD PRESSURE: 70 MMHG | WEIGHT: 132 LBS | BODY MASS INDEX: 20 KG/M2 | SYSTOLIC BLOOD PRESSURE: 104 MMHG | TEMPERATURE: 98.1 F | HEART RATE: 78 BPM | OXYGEN SATURATION: 99 % | HEIGHT: 68 IN | RESPIRATION RATE: 16 BRPM

## 2019-02-28 DIAGNOSIS — G89.29 CHRONIC BILATERAL LOW BACK PAIN WITH SCIATICA, SCIATICA LATERALITY UNSPECIFIED: ICD-10-CM

## 2019-02-28 DIAGNOSIS — E89.0 HYPOTHYROIDISM, POSTSURGICAL: ICD-10-CM

## 2019-02-28 DIAGNOSIS — M54.40 CHRONIC BILATERAL LOW BACK PAIN WITH SCIATICA, SCIATICA LATERALITY UNSPECIFIED: ICD-10-CM

## 2019-02-28 DIAGNOSIS — R10.2 CHRONIC PELVIC PAIN IN FEMALE: ICD-10-CM

## 2019-02-28 DIAGNOSIS — M79.7 FIBROMYALGIA: ICD-10-CM

## 2019-02-28 DIAGNOSIS — M25.50 MULTIPLE JOINT PAIN: ICD-10-CM

## 2019-02-28 DIAGNOSIS — E53.8 B12 DEFICIENCY: ICD-10-CM

## 2019-02-28 DIAGNOSIS — G89.29 CHRONIC PELVIC PAIN IN FEMALE: ICD-10-CM

## 2019-02-28 PROBLEM — M54.41 CHRONIC BILATERAL LOW BACK PAIN WITH SCIATICA: Status: ACTIVE | Noted: 2017-07-25

## 2019-02-28 PROBLEM — M54.42 CHRONIC BILATERAL LOW BACK PAIN WITH SCIATICA: Status: ACTIVE | Noted: 2017-07-25

## 2019-02-28 PROCEDURE — 99214 OFFICE O/P EST MOD 30 MIN: CPT | Performed by: NURSE PRACTITIONER

## 2019-02-28 ASSESSMENT — PATIENT HEALTH QUESTIONNAIRE - PHQ9: CLINICAL INTERPRETATION OF PHQ2 SCORE: 0

## 2019-03-01 NOTE — ASSESSMENT & PLAN NOTE
Long-standing problem discussed at prior visits.  She tells me today, however, that her pain has changed since the birth of her son 6 months ago and is been significantly worse.  All of her joints are achy and painful, very stiff.  She specifically mentions bilateral knees, hips, shoulders she did have testing in the past which included normal sed rate, negative JONO, negative celiac testing.  I am not seeing rheumatoid arthritis testing and review of her chart.   She denies any family history of RA or autoimmune disorder

## 2019-03-01 NOTE — ASSESSMENT & PLAN NOTE
Diagnosed several years ago by a rheumatologist after evaluation for muscle and joint pain.  She has been reluctant to consider any treatment for this but does admit that her current complaints may be related to fibromyalgia

## 2019-03-01 NOTE — PROGRESS NOTES
"Subjective:     Chief Complaint   Patient presents with   • Follow-Up     THYROID FV    • Results     LAB RESULTS    • Other     JOINTS HURT, HAS BEEN SICK 3 TIMES IN LAST MONTH    • Referral Needed     PELVIC SPECIALIST, WOULD LIKE SAME SPECIALIST, PRIOR AUTH     Lisa Arevalo is a 38 y.o. female here today to follow up on:    Chronic bilateral low back pain with sciatica  Chronic problem with bilateral low back pain.  She was previously experiencing sciatica but notes that this has improved with physical therapy.  No lower extremity numbness, tingling, weakness, no saddle anesthesiaLong standing problem with low back pain, with sciatica which improved with PT     Chronic pelvic pain in female  Difficulty with daily and persistent pelvic pain since the birth of her son 6 months ago.  She feels that the vaginal muscles are \"constricted\" which causes her discomfort.  Her discomfort can occur throughout the day but is also exacerbated by intercourse.  She has been working with the pelvic floor physical therapy specialist and notes that she is getting significant benefit with this treatment.  This provider is not contracted with her insurance and she is requesting medical statement so she may receive reimbursement for an out of network provider  She denies any difficulty with urinary incontinence, bulge in the pelvic region, difficulty emptying the bladder, urinary frequency    Fibromyalgia  Diagnosed several years ago by a rheumatologist after evaluation for muscle and joint pain.  She has been reluctant to consider any treatment for this but does admit that her current complaints may be related to fibromyalgia    Multiple joint pain  Long-standing problem discussed at prior visits.  She tells me today, however, that her pain has changed since the birth of her son 6 months ago and is been significantly worse.  All of her joints are achy and painful, very stiff.  She specifically mentions bilateral knees, hips, " "shoulders she did have testing in the past which included normal sed rate, negative JONO, negative celiac testing.  I am not seeing rheumatoid arthritis testing and review of her chart.  She is not taking any medication for this.  She denies swollen or red joints, heat over the joints, change in range of motion, unexplained fever or rash  She denies any family history of RA or autoimmune disorder    Hypothyroidism, postsurgical  Currently on levothyroxine 100 mcg daily, recent labs reviewed       Current medicines (including changes today)  Current Outpatient Prescriptions   Medication Sig Dispense Refill   • levothyroxine (SYNTHROID) 100 MCG Tab Take 1 Tab by mouth every morning. ON A EMPTY STOMACH 90 Tab 0   • Prenatal MV-Min-Fe Fum-FA-DHA (PRENATAL 1 PO) Take  by mouth.       No current facility-administered medications for this visit.      She  has a past medical history of Arrhythmia; Cold; Fibromyalgia; Heart burn; Hyperthyroidism (2010); Hypothyroidism, postsurgical (2010); Indigestion; Interstitial cystitis; Migraine; and Renal disorder.    ROS included above     Objective:     Blood pressure 104/70, pulse 78, temperature 36.7 °C (98.1 °F), temperature source Temporal, resp. rate 16, height 1.727 m (5' 8\"), weight 59.9 kg (132 lb), SpO2 99 %, unknown if currently breastfeeding. Body mass index is 20.07 kg/m².     Physical Exam:  General: Alert, oriented in no acute distress.  Eye contact is good, speech is normal, affect calm  Lungs: clear to auscultation bilaterally, normal effort, no wheeze/ rhonchi/ rales.  CV: regular rate and rhythm, S1, S2, no murmur  Abdomen: soft, diffusely tender, no masses, no distention  MS: Bilateral shoulders, elbows, knees, ankles without visible swelling or erythema.  No point tenderness of bilateral shoulder, knees.  Some tenderness over the lumbar spine, right SI joint, right hip  Ext: no edema, color normal, vascularity normal, temperature normal    Assessment and Plan: "   The following treatment plan was discussed  1. Chronic bilateral low back pain with sciatica, sciatica laterality unspecified   chronic problem, benefiting from physical therapy.   2. Chronic pelvic pain in female   this is a new issue since the birth of her son 6 months ago.  She has reportedly had a pelvic exam with her OB/GYN which was unremarkable.  She has been working with a pelvic floor physical therapy specialist and found this to be beneficial.  This provider is not contracted with her insurance and she was requesting some sort of medical statement so that she may get reimbursement.  She will get further clarification on what is needed from the insurance company and let me know   3. Multiple joint pain   long-standing problem and evaluated in the past without underlying etiology determined.  She was eventually diagnosed with fibromyalgia.  We discussed that her recent increase in symptoms may be related to her fibromyalgia.  We will, however, reevaluate labs as listed below  JONO REFLEXIVE PROFILE    WESTERGREN SED RATE    CBC WITH DIFFERENTIAL    RHEUMATOID ARTHRITIS FACTOR   4. B12 deficiency  On supplement   vITAMIN B12   5. Fibromyalgia   she may benefit from medication such as Cymbalta.  She does not wish to consider medication at this time   6. Hypothyroidism, postsurgical   stable       Followup: Pending labs         Please note that this dictation was created using voice recognition software. I have worked with consultants from the vendor as well as technical experts from [employer] to optimize the interface. I have made every reasonable attempt to correct obvious errors, but I expect that there are errors of grammar and possibly content that I did not discover before finalizing the note.

## 2019-03-01 NOTE — ASSESSMENT & PLAN NOTE
.  She was previously experiencing sciatica but notes that this has improved with physical therapy.  No lower extremity numbness, tingling, weakness, no saddle anesthesiaLong standing problem with low back pain, with sciatica which improved with PT

## 2019-03-01 NOTE — ASSESSMENT & PLAN NOTE
"Difficulty with daily and persistent pelvic pain since the birth of her son 6 months ago.  She feels that the vaginal muscles are \"constricted\" which causes her discomfort.  Her discomfort can occur throughout the day but is also exacerbated by intercourse.  She has been working with the pelvic floor physical therapy specialist and notes that she is getting significant benefit with this treatment.  This provider is not contracted with her insurance and she is requesting medical statement so she may receive reimbursement for an out of network provider  She denies any difficulty with urinary incontinence, bulge in the pelvic region, difficulty emptying the bladder, urinary frequency  "

## 2019-03-18 ENCOUNTER — PATIENT MESSAGE (OUTPATIENT)
Dept: MEDICAL GROUP | Facility: MEDICAL CENTER | Age: 39
End: 2019-03-18

## 2019-05-16 NOTE — ADDENDUM NOTE
Encounter addended by: Nenita Duffy R.N. on: 5/16/2019 11:26 AM<BR>    Actions taken: Utilization Review saved, Flowsheet accepted

## 2019-05-23 RX ORDER — LEVOTHYROXINE SODIUM 0.1 MG/1
TABLET ORAL
Qty: 90 TAB | Refills: 3 | Status: SHIPPED | OUTPATIENT
Start: 2019-05-23 | End: 2020-07-01

## 2019-10-08 NOTE — OP THERAPY EVALUATION
Outpatient Physical Therapy  INITIAL EVALUATION    [facility] [address]  [address]  [facility] [address]  Phone:  [phone]  Fax:  [phone]    Date of Evaluation: 2018    Patient: Lisa Arevalo  YOB: 1980  MRN: [id number]     Referring Provider: MASOOD Moody  [address] [facility]  [address]  [place] [address]   Referring Diagnosis Chronic low back pain, unspecified back pain laterality, with sciatica presence unspecified [M54.5, G89.29]     Time Calculation  Start time: 1300  Stop time: 1400 Time Calculation (min): 60 minutes     Physical Therapy Occurrence Codes    Date physical therapy care plan established or reviewed:  3/6/18   Date physical therapy treatment started:  3/6/18          Chief Complaint: No chief complaint on file.    Visit Diagnoses     ICD-10-CM   1. Acute bilateral low back pain with sciatica, sciatica laterality unspecified M54.40         Subjective:   History of Present Illness:     Date of onset:  2017    Mechanism of injury:  Recently dx with Polyneuropathy  Presently, intermittent right hip, constant lumbar spine.  C/o right hip and glute pain and occasional sacral pain. Walking increases pain currently but it is variable. Pregnant x 13 weeks.  Pain/dysesthisia sporadically throughout body.  Increased popping lumbar spine.    Currently doing Barr method/pilates, walking up to 1 hour        Fibromyalgia, chronic low back pain, stable hemangioma left sacrum per MRI, dysesthesia/paresthesia bilateral UE,LE, MRI normal for thoracic and cervical spine  Sleep disturbance:  Interrupted sleep (right hip anterior and lateral)  Pain:     Current pain ratin    At best pain ratin    At worst pain ratin    Location:  Right side lumbar spine and anteriorlateral hip    Quality:  Sharp (band like)    Pain timing:  Every morning, intermittent and in the evening    Relieving factors:  Position change,  positioning and relaxation    Aggravating factors:  Sitting, bending and walking (sitting reclined < 90 degrees)    Progression:  Worsening    Pain Comments::  Reclined sitting, lying unsupported flat supine,   Diagnostic Tests:     X-ray: normal      MRI studies: normal        Past Medical History:   Diagnosis Date   • Arrhythmia     palpitations   • Cold    • Fibromyalgia    • Heart burn    • Hyperthyroidism 2010    status post thyroidectomy   • Hypothyroidism, postsurgical 2010   • Indigestion    • Interstitial cystitis    • Migraine    • Renal disorder     intertstital cystitis     Past Surgical History:   Procedure Laterality Date   • THYROIDECTOMY TOTAL  9/29/2010    Performed by JUSTIN CLAYTON at SURGERY [facility] DAY [facility]   • TONSILLECTOMY AND ADENOIDECTOMY  1985   • MASTOIDECTOMY     • MYRINGOTOMY     • OTHER      placement of Anne Cath times 2   • OTHER ORTHOPEDIC SURGERY      broken left arm     Social History   Substance Use Topics   • Smoking status: Never Smoker   • Smokeless tobacco: Never Used   • Alcohol use 0.0 oz/week      Comment: rare     Family and Occupational History     Social History   • Marital status:      Spouse name: N/A   • Number of children: N/A   • Years of education: N/A       Objective     Observations   Spine (left)      Positive for ASIS high and PSIS low.    Postural Observations  Seated posture: good  Standing posture: good        Hip Screen   Hip range of motion within functional limits.  Hip strength within functional limits  Hip joint mobility within functional limits    Palpation     Right   Hypertonic in the lumbar paraspinals, piriformis and quadratus lumborum.   Tenderness of the lumbar paraspinals and quadratus lumborum.     Tenderness     Right Hip   Tenderness in the ASIS.     Active Range of Motion     Lumbar   Flexion: decreased  Extension: decreased  Left lateral flexion: decreased  Right lateral flexion: within functional limits  Left rotation:  within functional limits  Right rotation: within functional limits    Additional Active Range of Motion Details  Right sided lumbar stiffness with left sidebending    Passive Range of Motion     Lumbar   All lumbar passive range of motion within functional limits    Joint Play   Spine     Central PA [place]        L2: hypomobile       L3: hypomobile and painful       L4: hypomobile and painful       L5: hypomobile and painful       S1: hypomobile and painful    Unilateral PA Glide (right)        T10: hypomobile       T11: hypomobile       L4: hypomobile and painful       L5: painful and hypomobile        Tests     Left Hip   SLR: Negative.     Right Hip   SLR: Negative.         Therapeutic Exercises (CPT 65907):     1. Quadraped rocking    2. Prayer stretch    Therapeutic Treatments and Modalities:     1. Manual Therapy (CPT 89521), right lumbar paraspinals IASTM    Time-based treatments/modalities:          Assessment, Response and Plan:   Assessment details:  Patient presents with Right constant lumbar and intermittent right hip painwhich is limiting her ability to walk and sleep.  Patient is pregnant x 13 weeks which may be contributing to symptoms. Patient has +anxiety and fear avoidance which limited previous episode of back pain.  Patient will benefit from skilled PT to meet goals stated below.  Barriers to therapy:  Comorbidities  Prognosis: good    Goals:   Short Term Goals:   RMQ < 25% perceived disability  Patient able to walk 30 + min for exercise symptoms < 2/10 VAS  Short term goal time span:  2-4 weeks      Long Term Goals:    Symptom Free walking > 1 hour  Symptom free sleeping > 6 hours  Independent with home exercise program  Long term goal time span:  4-6 weeks    Plan:   Therapy options:  Physical therapy treatment to continue  Planned therapy interventions:  Therapeutic Exercise (CPT 85430), Manual Therapy (CPT 38937), Neuromuscular Re-education (CPT 41292) and Therapeutic Activities (CPT  71004)  Frequency:  2x week  Duration in weeks:  6  Duration in visits:  12  Discussed with:  Patient      Functional Limitation G-Codes and Severity Modifiers     Current:     Goal:       Referring provider co-signature:  I have reviewed this plan of care and my co-signature certifies the need for services.  Certification Dates:   From 3/6/2018    To 4/19/2018    Physician Signature: ________________________________ Date: ______________      Treatment Number (Optional): 1

## 2020-01-16 ENCOUNTER — HOSPITAL ENCOUNTER (OUTPATIENT)
Dept: LAB | Facility: MEDICAL CENTER | Age: 40
End: 2020-01-16
Attending: OBSTETRICS & GYNECOLOGY
Payer: COMMERCIAL

## 2020-01-16 LAB — B-HCG SERPL-ACNC: <0.6 MIU/ML (ref 0–5)

## 2020-01-16 PROCEDURE — 84702 CHORIONIC GONADOTROPIN TEST: CPT

## 2020-01-16 PROCEDURE — 36415 COLL VENOUS BLD VENIPUNCTURE: CPT

## 2020-03-16 ENCOUNTER — TELEPHONE (OUTPATIENT)
Dept: HEALTH INFORMATION MANAGEMENT | Facility: OTHER | Age: 40
End: 2020-03-16

## 2020-03-16 NOTE — TELEPHONE ENCOUNTER
"1. Caller Name: Lisa Arevalo                      Call Back Number: [phone]  [facility] PCP or Specialty Provider: Yes Breanna Michel        2.  Does patient have any active symptoms of respiratory illness (fever OR cough OR shortness of breath)? Yes, the patient reports the following respiratory symptoms: cough., occassional SOB, feeling \"like I have to take a deep breath sometimes, but not feeling in distress\" Pt states she had a cold 2 wks ago, along with nausea/diarrhea. No fever    3.  Does patient have any comoribidities? None     4.  In the last 30 days, has the patient traveled outside of the country OR in a high risk area within the  OR have any known contact with someone who has or is suspected to have COVID-19?  No.    5. Disposition: Advised to go to  [facility], [facility], [facility], also call from PCP. (conferred with MD here at triage).     Note routed to PCP: [facility] only. Patient had a\"cold\" 2 wks ago, along with nausea/diarrhea. Today she has noticed feeling as though she has to \"take a deep breath every so often\", and states her \"chest feels a little funny\", but denies chest pain/pressure. She also admits feeling anxious about covid19. Conferred with Dr Bravo, and pt given options for  [facility], ER, call back from PCP office, also given contact for [facility] & non-emergent [facility]. Advised to call 911 if symptoms worsen.      "

## 2020-04-07 ENCOUNTER — TELEPHONE (OUTPATIENT)
Dept: MEDICAL GROUP | Facility: MEDICAL CENTER | Age: 40
End: 2020-04-07

## 2020-04-07 DIAGNOSIS — E89.0 HYPOTHYROIDISM, POSTSURGICAL: ICD-10-CM

## 2020-04-07 NOTE — TELEPHONE ENCOUNTER
Caller Name: Lisa Arevalo  Call Back Number: [phone] (home)   How would the patient prefer to be contacted with a response: Phone call do NOT leave a detailed message    Pt called and is requesting labs to be done to test her thyroid. She said she has been feeling unwell for a few days.    Please advise

## 2020-07-01 RX ORDER — LEVOTHYROXINE SODIUM 0.1 MG/1
100 TABLET ORAL
Qty: 90 TAB | Refills: 0 | Status: SHIPPED | OUTPATIENT
Start: 2020-07-01 | End: 2020-10-23

## 2020-07-01 NOTE — TELEPHONE ENCOUNTER
90-day refill provided.  Left voicemail notifying patient that she needs to complete labs, orders in epic

## 2020-10-21 ENCOUNTER — HOSPITAL ENCOUNTER (OUTPATIENT)
Dept: LAB | Facility: MEDICAL CENTER | Age: 40
End: 2020-10-21
Attending: NURSE PRACTITIONER
Payer: COMMERCIAL

## 2020-10-21 DIAGNOSIS — E89.0 HYPOTHYROIDISM, POSTSURGICAL: ICD-10-CM

## 2020-10-21 LAB — T4 FREE SERPL-MCNC: 1.46 NG/DL (ref 0.93–1.7)

## 2020-10-21 PROCEDURE — 84443 ASSAY THYROID STIM HORMONE: CPT

## 2020-10-21 PROCEDURE — 36415 COLL VENOUS BLD VENIPUNCTURE: CPT

## 2020-10-21 PROCEDURE — 84439 ASSAY OF FREE THYROXINE: CPT

## 2020-10-22 LAB — TSH SERPL DL<=0.005 MIU/L-ACNC: 4.05 UIU/ML (ref 0.38–5.33)

## 2020-10-23 RX ORDER — LEVOTHYROXINE SODIUM 0.1 MG/1
TABLET ORAL
Qty: 90 TAB | Refills: 0 | Status: SHIPPED | OUTPATIENT
Start: 2020-10-23 | End: 2021-02-26

## 2020-10-23 NOTE — TELEPHONE ENCOUNTER
Received request via: Pharmacy    Was the patient seen in the last year in this department? 02/28/19    Does the patient have an active prescription (recently filled or refills available) for medication(s) requested? No     Requested Prescriptions     Pending Prescriptions Disp Refills   • levothyroxine (SYNTHROID) 100 MCG Tab [Pharmacy Med Name: Levothyroxine Sodium Oral Tablet 100 MCG] 90 Tab 0     Sig: TAKE ONE TABLET BY MOUTH EVERY MORNING ON AN EMPTY STOMACH

## 2020-12-01 ENCOUNTER — APPOINTMENT (OUTPATIENT)
Dept: RADIOLOGY | Facility: MEDICAL CENTER | Age: 40
End: 2020-12-01
Attending: OBSTETRICS & GYNECOLOGY
Payer: COMMERCIAL

## 2020-12-03 ENCOUNTER — APPOINTMENT (OUTPATIENT)
Dept: RADIOLOGY | Facility: MEDICAL CENTER | Age: 40
End: 2020-12-03
Attending: OBSTETRICS & GYNECOLOGY
Payer: COMMERCIAL

## 2021-02-26 RX ORDER — LEVOTHYROXINE SODIUM 0.1 MG/1
TABLET ORAL
Qty: 90 TABLET | Refills: 1 | Status: SHIPPED | OUTPATIENT
Start: 2021-02-26

## 2021-02-26 NOTE — TELEPHONE ENCOUNTER
Received request via: Pharmacy    Was the patient seen in the last year in this department? Yes    Does the patient have an active prescription (recently filled or refills available) for medication(s) requested? No     Requested Prescriptions     Pending Prescriptions Disp Refills   • levothyroxine (SYNTHROID) 100 MCG Tab [Pharmacy Med Name: Levothyroxine Sodium Oral Tablet 100 MCG]  0     Sig: TAKE ONE TABLET BY MOUTH EVERY MORNING ON AN EMPTY STOMACH

## 2021-05-27 ENCOUNTER — HOSPITAL ENCOUNTER (OUTPATIENT)
Dept: LAB | Facility: MEDICAL CENTER | Age: 41
End: 2021-05-27
Attending: PHYSICIAN ASSISTANT
Payer: COMMERCIAL

## 2021-05-27 LAB
ALBUMIN SERPL BCP-MCNC: 4.2 G/DL (ref 3.2–4.9)
ALBUMIN/GLOB SERPL: 1.6 G/DL
ALP SERPL-CCNC: 59 U/L (ref 30–99)
ALT SERPL-CCNC: 9 U/L (ref 2–50)
ANION GAP SERPL CALC-SCNC: 7 MMOL/L (ref 7–16)
AST SERPL-CCNC: 10 U/L (ref 12–45)
BASOPHILS # BLD AUTO: 1.7 % (ref 0–1.8)
BASOPHILS # BLD: 0.07 K/UL (ref 0–0.12)
BILIRUB SERPL-MCNC: 2.6 MG/DL (ref 0.1–1.5)
BUN SERPL-MCNC: 6 MG/DL (ref 8–22)
CALCIUM SERPL-MCNC: 9.1 MG/DL (ref 8.5–10.5)
CHLORIDE SERPL-SCNC: 108 MMOL/L (ref 96–112)
CHOLEST SERPL-MCNC: 142 MG/DL (ref 100–199)
CO2 SERPL-SCNC: 23 MMOL/L (ref 20–33)
CREAT SERPL-MCNC: 0.83 MG/DL (ref 0.5–1.4)
EOSINOPHIL # BLD AUTO: 0.08 K/UL (ref 0–0.51)
EOSINOPHIL NFR BLD: 2 % (ref 0–6.9)
ERYTHROCYTE [DISTWIDTH] IN BLOOD BY AUTOMATED COUNT: 43.1 FL (ref 35.9–50)
EST. AVERAGE GLUCOSE BLD GHB EST-MCNC: 105 MG/DL
FASTING STATUS PATIENT QL REPORTED: NORMAL
GLOBULIN SER CALC-MCNC: 2.7 G/DL (ref 1.9–3.5)
GLUCOSE SERPL-MCNC: 87 MG/DL (ref 65–99)
HBA1C MFR BLD: 5.3 % (ref 4–5.6)
HCT VFR BLD AUTO: 43.7 % (ref 37–47)
HDLC SERPL-MCNC: 57 MG/DL
HGB BLD-MCNC: 13.7 G/DL (ref 12–16)
IMM GRANULOCYTES # BLD AUTO: 0 K/UL (ref 0–0.11)
IMM GRANULOCYTES NFR BLD AUTO: 0 % (ref 0–0.9)
LDLC SERPL CALC-MCNC: 69 MG/DL
LYMPHOCYTES # BLD AUTO: 1.36 K/UL (ref 1–4.8)
LYMPHOCYTES NFR BLD: 33.6 % (ref 22–41)
MCH RBC QN AUTO: 28.8 PG (ref 27–33)
MCHC RBC AUTO-ENTMCNC: 31.4 G/DL (ref 33.6–35)
MCV RBC AUTO: 91.8 FL (ref 81.4–97.8)
MONOCYTES # BLD AUTO: 0.34 K/UL (ref 0–0.85)
MONOCYTES NFR BLD AUTO: 8.4 % (ref 0–13.4)
NEUTROPHILS # BLD AUTO: 2.2 K/UL (ref 2–7.15)
NEUTROPHILS NFR BLD: 54.3 % (ref 44–72)
NRBC # BLD AUTO: 0 K/UL
NRBC BLD-RTO: 0 /100 WBC
PLATELET # BLD AUTO: 196 K/UL (ref 164–446)
PMV BLD AUTO: 10.6 FL (ref 9–12.9)
POTASSIUM SERPL-SCNC: 4.8 MMOL/L (ref 3.6–5.5)
PROT SERPL-MCNC: 6.9 G/DL (ref 6–8.2)
RBC # BLD AUTO: 4.76 M/UL (ref 4.2–5.4)
SODIUM SERPL-SCNC: 138 MMOL/L (ref 135–145)
TRIGL SERPL-MCNC: 78 MG/DL (ref 0–149)
WBC # BLD AUTO: 4.1 K/UL (ref 4.8–10.8)

## 2021-05-27 PROCEDURE — 85025 COMPLETE CBC W/AUTO DIFF WBC: CPT

## 2021-05-27 PROCEDURE — 80061 LIPID PANEL: CPT

## 2021-05-27 PROCEDURE — 83036 HEMOGLOBIN GLYCOSYLATED A1C: CPT

## 2021-05-27 PROCEDURE — 80053 COMPREHEN METABOLIC PANEL: CPT

## 2021-05-27 PROCEDURE — 82306 VITAMIN D 25 HYDROXY: CPT

## 2021-05-27 PROCEDURE — 82607 VITAMIN B-12: CPT

## 2021-05-27 PROCEDURE — 36415 COLL VENOUS BLD VENIPUNCTURE: CPT

## 2021-05-27 PROCEDURE — 84443 ASSAY THYROID STIM HORMONE: CPT

## 2021-05-28 LAB
TSH SERPL DL<=0.005 MIU/L-ACNC: 3.08 UIU/ML (ref 0.38–5.33)
VIT B12 SERPL-MCNC: 534 PG/ML (ref 211–911)

## 2021-05-29 LAB — 25(OH)D3 SERPL-MCNC: 30 NG/ML (ref 30–80)

## 2021-07-23 ENCOUNTER — HOSPITAL ENCOUNTER (OUTPATIENT)
Dept: LAB | Facility: MEDICAL CENTER | Age: 41
End: 2021-07-23
Attending: FAMILY MEDICINE
Payer: COMMERCIAL

## 2021-07-23 LAB
ALBUMIN SERPL BCP-MCNC: 4.3 G/DL (ref 3.2–4.9)
ALBUMIN/GLOB SERPL: 1.6 G/DL
ALP SERPL-CCNC: 69 U/L (ref 30–99)
ALT SERPL-CCNC: 10 U/L (ref 2–50)
ANION GAP SERPL CALC-SCNC: 10 MMOL/L (ref 7–16)
AST SERPL-CCNC: 17 U/L (ref 12–45)
BASOPHILS # BLD AUTO: 1.9 % (ref 0–1.8)
BASOPHILS # BLD: 0.07 K/UL (ref 0–0.12)
BILIRUB SERPL-MCNC: 1.9 MG/DL (ref 0.1–1.5)
BUN SERPL-MCNC: 7 MG/DL (ref 8–22)
CALCIUM SERPL-MCNC: 9 MG/DL (ref 8.5–10.5)
CHLORIDE SERPL-SCNC: 109 MMOL/L (ref 96–112)
CO2 SERPL-SCNC: 22 MMOL/L (ref 20–33)
CREAT SERPL-MCNC: 0.79 MG/DL (ref 0.5–1.4)
CRP SERPL HS-MCNC: <0.3 MG/DL (ref 0–0.75)
EOSINOPHIL # BLD AUTO: 0.12 K/UL (ref 0–0.51)
EOSINOPHIL NFR BLD: 3.2 % (ref 0–6.9)
ERYTHROCYTE [DISTWIDTH] IN BLOOD BY AUTOMATED COUNT: 43.4 FL (ref 35.9–50)
ERYTHROCYTE [SEDIMENTATION RATE] IN BLOOD BY WESTERGREN METHOD: 7 MM/HOUR (ref 0–25)
GLOBULIN SER CALC-MCNC: 2.7 G/DL (ref 1.9–3.5)
GLUCOSE SERPL-MCNC: 80 MG/DL (ref 65–99)
HCT VFR BLD AUTO: 44 % (ref 37–47)
HGB BLD-MCNC: 13.9 G/DL (ref 12–16)
IMM GRANULOCYTES # BLD AUTO: 0.01 K/UL (ref 0–0.11)
IMM GRANULOCYTES NFR BLD AUTO: 0.3 % (ref 0–0.9)
LYMPHOCYTES # BLD AUTO: 1.33 K/UL (ref 1–4.8)
LYMPHOCYTES NFR BLD: 35.7 % (ref 22–41)
MCH RBC QN AUTO: 28.9 PG (ref 27–33)
MCHC RBC AUTO-ENTMCNC: 31.6 G/DL (ref 33.6–35)
MCV RBC AUTO: 91.5 FL (ref 81.4–97.8)
MONOCYTES # BLD AUTO: 0.31 K/UL (ref 0–0.85)
MONOCYTES NFR BLD AUTO: 8.3 % (ref 0–13.4)
NEUTROPHILS # BLD AUTO: 1.89 K/UL (ref 2–7.15)
NEUTROPHILS NFR BLD: 50.6 % (ref 44–72)
NRBC # BLD AUTO: 0 K/UL
NRBC BLD-RTO: 0 /100 WBC
PLATELET # BLD AUTO: 221 K/UL (ref 164–446)
PMV BLD AUTO: 10.9 FL (ref 9–12.9)
POTASSIUM SERPL-SCNC: 4.8 MMOL/L (ref 3.6–5.5)
PROT SERPL-MCNC: 7 G/DL (ref 6–8.2)
RBC # BLD AUTO: 4.81 M/UL (ref 4.2–5.4)
RHEUMATOID FACT SER IA-ACNC: <10 IU/ML (ref 0–14)
SODIUM SERPL-SCNC: 141 MMOL/L (ref 135–145)
T3 SERPL-MCNC: 95.7 NG/DL (ref 60–181)
T4 FREE SERPL-MCNC: 1.54 NG/DL (ref 0.93–1.7)
TSH SERPL DL<=0.005 MIU/L-ACNC: 2.18 UIU/ML (ref 0.38–5.33)
WBC # BLD AUTO: 3.7 K/UL (ref 4.8–10.8)

## 2021-07-23 PROCEDURE — 84439 ASSAY OF FREE THYROXINE: CPT

## 2021-07-23 PROCEDURE — 86431 RHEUMATOID FACTOR QUANT: CPT

## 2021-07-23 PROCEDURE — 86140 C-REACTIVE PROTEIN: CPT

## 2021-07-23 PROCEDURE — 86038 ANTINUCLEAR ANTIBODIES: CPT

## 2021-07-23 PROCEDURE — 84443 ASSAY THYROID STIM HORMONE: CPT

## 2021-07-23 PROCEDURE — 36415 COLL VENOUS BLD VENIPUNCTURE: CPT

## 2021-07-23 PROCEDURE — 84480 ASSAY TRIIODOTHYRONINE (T3): CPT

## 2021-07-23 PROCEDURE — 85652 RBC SED RATE AUTOMATED: CPT

## 2021-07-23 PROCEDURE — 85025 COMPLETE CBC W/AUTO DIFF WBC: CPT

## 2021-07-23 PROCEDURE — 80053 COMPREHEN METABOLIC PANEL: CPT

## 2021-07-26 LAB — NUCLEAR IGG SER QL IA: NORMAL

## 2022-03-04 ENCOUNTER — HOSPITAL ENCOUNTER (OUTPATIENT)
Dept: LAB | Facility: MEDICAL CENTER | Age: 42
End: 2022-03-04
Attending: OBSTETRICS & GYNECOLOGY
Payer: COMMERCIAL

## 2022-03-04 LAB
BASOPHILS # BLD AUTO: 1.6 % (ref 0–1.8)
BASOPHILS # BLD: 0.06 K/UL (ref 0–0.12)
EOSINOPHIL # BLD AUTO: 0.08 K/UL (ref 0–0.51)
EOSINOPHIL NFR BLD: 2.2 % (ref 0–6.9)
ERYTHROCYTE [DISTWIDTH] IN BLOOD BY AUTOMATED COUNT: 42 FL (ref 35.9–50)
HCT VFR BLD AUTO: 41.1 % (ref 37–47)
HGB BLD-MCNC: 13.5 G/DL (ref 12–16)
IMM GRANULOCYTES # BLD AUTO: 0.01 K/UL (ref 0–0.11)
IMM GRANULOCYTES NFR BLD AUTO: 0.3 % (ref 0–0.9)
IRON SATN MFR SERPL: 18 % (ref 15–55)
IRON SERPL-MCNC: 65 UG/DL (ref 40–170)
LYMPHOCYTES # BLD AUTO: 1.52 K/UL (ref 1–4.8)
LYMPHOCYTES NFR BLD: 41 % (ref 22–41)
MCH RBC QN AUTO: 28.9 PG (ref 27–33)
MCHC RBC AUTO-ENTMCNC: 32.8 G/DL (ref 33.6–35)
MCV RBC AUTO: 88 FL (ref 81.4–97.8)
MONOCYTES # BLD AUTO: 0.32 K/UL (ref 0–0.85)
MONOCYTES NFR BLD AUTO: 8.6 % (ref 0–13.4)
NEUTROPHILS # BLD AUTO: 1.72 K/UL (ref 2–7.15)
NEUTROPHILS NFR BLD: 46.3 % (ref 44–72)
NRBC # BLD AUTO: 0 K/UL
NRBC BLD-RTO: 0 /100 WBC
PLATELET # BLD AUTO: 214 K/UL (ref 164–446)
PMV BLD AUTO: 10.8 FL (ref 9–12.9)
RBC # BLD AUTO: 4.67 M/UL (ref 4.2–5.4)
TIBC SERPL-MCNC: 353 UG/DL (ref 250–450)
TSH SERPL DL<=0.005 MIU/L-ACNC: 0.48 UIU/ML (ref 0.38–5.33)
UIBC SERPL-MCNC: 288 UG/DL (ref 110–370)
WBC # BLD AUTO: 3.7 K/UL (ref 4.8–10.8)

## 2022-03-04 PROCEDURE — 84443 ASSAY THYROID STIM HORMONE: CPT

## 2022-03-04 PROCEDURE — 85025 COMPLETE CBC W/AUTO DIFF WBC: CPT

## 2022-03-04 PROCEDURE — 83550 IRON BINDING TEST: CPT

## 2022-03-04 PROCEDURE — 36415 COLL VENOUS BLD VENIPUNCTURE: CPT

## 2022-03-04 PROCEDURE — 83540 ASSAY OF IRON: CPT

## 2022-06-18 ENCOUNTER — HOSPITAL ENCOUNTER (OUTPATIENT)
Dept: LAB | Facility: MEDICAL CENTER | Age: 42
End: 2022-06-18
Attending: FAMILY MEDICINE
Payer: COMMERCIAL

## 2022-06-18 LAB
25(OH)D3 SERPL-MCNC: 40 NG/ML (ref 30–100)
BASOPHILS # BLD AUTO: 1.7 % (ref 0–1.8)
BASOPHILS # BLD: 0.06 K/UL (ref 0–0.12)
CHOLEST SERPL-MCNC: 155 MG/DL (ref 100–199)
EOSINOPHIL # BLD AUTO: 0.06 K/UL (ref 0–0.51)
EOSINOPHIL NFR BLD: 1.7 % (ref 0–6.9)
ERYTHROCYTE [DISTWIDTH] IN BLOOD BY AUTOMATED COUNT: 43.8 FL (ref 35.9–50)
FASTING STATUS PATIENT QL REPORTED: NORMAL
HCT VFR BLD AUTO: 42.1 % (ref 37–47)
HDLC SERPL-MCNC: 62 MG/DL
HGB BLD-MCNC: 13.3 G/DL (ref 12–16)
LDLC SERPL CALC-MCNC: 83 MG/DL
LYMPHOCYTES # BLD AUTO: 1.03 K/UL (ref 1–4.8)
LYMPHOCYTES NFR BLD: 27.8 % (ref 22–41)
MANUAL DIFF BLD: NORMAL
MCH RBC QN AUTO: 27.9 PG (ref 27–33)
MCHC RBC AUTO-ENTMCNC: 31.6 G/DL (ref 33.6–35)
MCV RBC AUTO: 88.4 FL (ref 81.4–97.8)
MONOCYTES # BLD AUTO: 0.16 K/UL (ref 0–0.85)
MONOCYTES NFR BLD AUTO: 4.4 % (ref 0–13.4)
NEUTROPHILS # BLD AUTO: 2.38 K/UL (ref 2–7.15)
NEUTROPHILS NFR BLD: 64.4 % (ref 44–72)
PLATELET # BLD AUTO: 231 K/UL (ref 164–446)
PMV BLD AUTO: 10.5 FL (ref 9–12.9)
RBC # BLD AUTO: 4.76 M/UL (ref 4.2–5.4)
T3 SERPL-MCNC: 102 NG/DL (ref 60–181)
TRIGL SERPL-MCNC: 51 MG/DL (ref 0–149)
WBC # BLD AUTO: 3.7 K/UL (ref 4.8–10.8)

## 2022-06-18 PROCEDURE — 84439 ASSAY OF FREE THYROXINE: CPT

## 2022-06-18 PROCEDURE — 36415 COLL VENOUS BLD VENIPUNCTURE: CPT

## 2022-06-18 PROCEDURE — 80503 PATH CLIN CONSLTJ SF 5-20: CPT

## 2022-06-18 PROCEDURE — 82306 VITAMIN D 25 HYDROXY: CPT

## 2022-06-18 PROCEDURE — 85027 COMPLETE CBC AUTOMATED: CPT

## 2022-06-18 PROCEDURE — 80061 LIPID PANEL: CPT

## 2022-06-18 PROCEDURE — 84480 ASSAY TRIIODOTHYRONINE (T3): CPT

## 2022-06-18 PROCEDURE — 85007 BL SMEAR W/DIFF WBC COUNT: CPT

## 2022-06-19 LAB
PATH REV: NORMAL
PATH REV: NORMAL

## 2022-06-25 LAB — T4 FREE SERPL DIALY-MCNC: 2.3 NG/DL (ref 1.1–2.4)

## 2022-10-13 ENCOUNTER — HOSPITAL ENCOUNTER (OUTPATIENT)
Facility: MEDICAL CENTER | Age: 42
End: 2022-10-13
Attending: INTERNAL MEDICINE
Payer: COMMERCIAL

## 2022-10-13 PROCEDURE — 82746 ASSAY OF FOLIC ACID SERUM: CPT

## 2022-10-13 PROCEDURE — 82607 VITAMIN B-12: CPT

## 2022-10-14 LAB
FOLATE SERPL-MCNC: 5.2 NG/ML
VIT B12 SERPL-MCNC: 621 PG/ML (ref 211–911)

## 2023-06-10 ENCOUNTER — HOSPITAL ENCOUNTER (OUTPATIENT)
Dept: LAB | Facility: MEDICAL CENTER | Age: 43
End: 2023-06-10
Attending: FAMILY MEDICINE
Payer: COMMERCIAL

## 2023-06-10 LAB
T3 SERPL-MCNC: 83 NG/DL (ref 60–181)
T4 FREE SERPL-MCNC: 1.13 NG/DL (ref 0.93–1.7)
TSH SERPL DL<=0.005 MIU/L-ACNC: 8.04 UIU/ML (ref 0.38–5.33)

## 2023-06-10 PROCEDURE — 84480 ASSAY TRIIODOTHYRONINE (T3): CPT

## 2023-06-10 PROCEDURE — 86364 TISS TRNSGLTMNASE EA IG CLAS: CPT

## 2023-06-10 PROCEDURE — 84443 ASSAY THYROID STIM HORMONE: CPT

## 2023-06-10 PROCEDURE — 82784 ASSAY IGA/IGD/IGG/IGM EACH: CPT

## 2023-06-10 PROCEDURE — 36415 COLL VENOUS BLD VENIPUNCTURE: CPT

## 2023-06-10 PROCEDURE — 84439 ASSAY OF FREE THYROXINE: CPT

## 2023-06-13 LAB
IGA SERPL-MCNC: 268 MG/DL (ref 68–408)
TTG IGA SER IA-ACNC: 2 U/ML (ref 0–3)

## 2023-07-07 ENCOUNTER — HOSPITAL ENCOUNTER (OUTPATIENT)
Dept: LAB | Facility: MEDICAL CENTER | Age: 43
End: 2023-07-07
Attending: FAMILY MEDICINE
Payer: COMMERCIAL

## 2023-07-07 ENCOUNTER — APPOINTMENT (OUTPATIENT)
Dept: CARDIOLOGY | Facility: MEDICAL CENTER | Age: 43
End: 2023-07-07
Attending: FAMILY MEDICINE
Payer: COMMERCIAL

## 2023-07-07 LAB
T3 SERPL-MCNC: 96.5 NG/DL (ref 60–181)
T4 FREE SERPL-MCNC: 1.59 NG/DL (ref 0.93–1.7)
TSH SERPL DL<=0.005 MIU/L-ACNC: 1.15 UIU/ML (ref 0.38–5.33)

## 2023-07-07 PROCEDURE — 84439 ASSAY OF FREE THYROXINE: CPT

## 2023-07-07 PROCEDURE — 36415 COLL VENOUS BLD VENIPUNCTURE: CPT

## 2023-07-07 PROCEDURE — 84443 ASSAY THYROID STIM HORMONE: CPT

## 2023-07-07 PROCEDURE — 84480 ASSAY TRIIODOTHYRONINE (T3): CPT

## 2023-08-16 ENCOUNTER — APPOINTMENT (RX ONLY)
Dept: URBAN - METROPOLITAN AREA CLINIC 6 | Facility: CLINIC | Age: 43
Setting detail: DERMATOLOGY
End: 2023-08-16

## 2023-08-16 DIAGNOSIS — L81.4 OTHER MELANIN HYPERPIGMENTATION: ICD-10-CM

## 2023-08-16 DIAGNOSIS — L91.8 OTHER HYPERTROPHIC DISORDERS OF THE SKIN: ICD-10-CM

## 2023-08-16 DIAGNOSIS — D22 MELANOCYTIC NEVI: ICD-10-CM

## 2023-08-16 DIAGNOSIS — D18.0 HEMANGIOMA: ICD-10-CM

## 2023-08-16 DIAGNOSIS — L82.1 OTHER SEBORRHEIC KERATOSIS: ICD-10-CM

## 2023-08-16 DIAGNOSIS — Z71.89 OTHER SPECIFIED COUNSELING: ICD-10-CM

## 2023-08-16 PROBLEM — D22.5 MELANOCYTIC NEVI OF TRUNK: Status: ACTIVE | Noted: 2023-08-16

## 2023-08-16 PROBLEM — D18.01 HEMANGIOMA OF SKIN AND SUBCUTANEOUS TISSUE: Status: ACTIVE | Noted: 2023-08-16

## 2023-08-16 PROCEDURE — ? BENIGN DESTRUCTION COSMETIC

## 2023-08-16 PROCEDURE — ? COUNSELING

## 2023-08-16 PROCEDURE — 99203 OFFICE O/P NEW LOW 30 MIN: CPT

## 2023-08-16 PROCEDURE — ? SUNSCREEN TREATMENT REGIMEN

## 2023-08-16 ASSESSMENT — LOCATION SIMPLE DESCRIPTION DERM
LOCATION SIMPLE: CHEST
LOCATION SIMPLE: LEFT FOREHEAD
LOCATION SIMPLE: RIGHT HAND
LOCATION SIMPLE: ABDOMEN
LOCATION SIMPLE: LEFT ANTERIOR AXILLA
LOCATION SIMPLE: UPPER BACK
LOCATION SIMPLE: LEFT HAND

## 2023-08-16 ASSESSMENT — LOCATION ZONE DERM
LOCATION ZONE: TRUNK
LOCATION ZONE: HAND
LOCATION ZONE: FACE
LOCATION ZONE: AXILLAE

## 2023-08-16 ASSESSMENT — LOCATION DETAILED DESCRIPTION DERM
LOCATION DETAILED: RIGHT RADIAL DORSAL HAND
LOCATION DETAILED: LEFT RADIAL DORSAL HAND
LOCATION DETAILED: MIDDLE STERNUM
LOCATION DETAILED: PERIUMBILICAL SKIN
LOCATION DETAILED: EPIGASTRIC SKIN
LOCATION DETAILED: LEFT INFERIOR FOREHEAD
LOCATION DETAILED: LEFT ANTERIOR AXILLA
LOCATION DETAILED: SUPERIOR THORACIC SPINE

## 2023-08-25 ENCOUNTER — TELEPHONE (OUTPATIENT)
Dept: HEALTH INFORMATION MANAGEMENT | Facility: OTHER | Age: 43
End: 2023-08-25

## 2024-01-02 ENCOUNTER — HOSPITAL ENCOUNTER (OUTPATIENT)
Dept: LAB | Facility: MEDICAL CENTER | Age: 44
End: 2024-01-02
Attending: FAMILY MEDICINE
Payer: COMMERCIAL

## 2024-01-02 LAB
T3 SERPL-MCNC: 101 NG/DL (ref 60–181)
T4 FREE SERPL-MCNC: 1.57 NG/DL (ref 0.93–1.7)
TSH SERPL DL<=0.005 MIU/L-ACNC: 0.24 UIU/ML (ref 0.38–5.33)

## 2024-01-02 PROCEDURE — 36415 COLL VENOUS BLD VENIPUNCTURE: CPT

## 2024-01-02 PROCEDURE — 84480 ASSAY TRIIODOTHYRONINE (T3): CPT

## 2024-01-02 PROCEDURE — 84443 ASSAY THYROID STIM HORMONE: CPT

## 2024-01-02 PROCEDURE — 84439 ASSAY OF FREE THYROXINE: CPT

## 2024-01-10 ENCOUNTER — OFFICE VISIT (OUTPATIENT)
Dept: CARDIOLOGY | Facility: MEDICAL CENTER | Age: 44
End: 2024-01-10
Attending: INTERNAL MEDICINE
Payer: COMMERCIAL

## 2024-01-10 VITALS
HEIGHT: 68 IN | RESPIRATION RATE: 16 BRPM | OXYGEN SATURATION: 99 % | BODY MASS INDEX: 21.67 KG/M2 | HEART RATE: 87 BPM | DIASTOLIC BLOOD PRESSURE: 72 MMHG | SYSTOLIC BLOOD PRESSURE: 116 MMHG | WEIGHT: 143 LBS

## 2024-01-10 DIAGNOSIS — R00.2 PALPITATIONS: ICD-10-CM

## 2024-01-10 DIAGNOSIS — I47.10 PAROXYSMAL SUPRAVENTRICULAR TACHYCARDIA (HCC): ICD-10-CM

## 2024-01-10 LAB — EKG IMPRESSION: NORMAL

## 2024-01-10 PROCEDURE — 93005 ELECTROCARDIOGRAM TRACING: CPT | Performed by: INTERNAL MEDICINE

## 2024-01-10 PROCEDURE — 99204 OFFICE O/P NEW MOD 45 MIN: CPT | Performed by: INTERNAL MEDICINE

## 2024-01-10 PROCEDURE — 3078F DIAST BP <80 MM HG: CPT | Performed by: INTERNAL MEDICINE

## 2024-01-10 PROCEDURE — 3074F SYST BP LT 130 MM HG: CPT | Performed by: INTERNAL MEDICINE

## 2024-01-10 PROCEDURE — 99211 OFF/OP EST MAY X REQ PHY/QHP: CPT | Performed by: INTERNAL MEDICINE

## 2024-01-10 PROCEDURE — 93010 ELECTROCARDIOGRAM REPORT: CPT | Performed by: INTERNAL MEDICINE

## 2024-01-10 RX ORDER — METOPROLOL SUCCINATE 25 MG/1
12.5 TABLET, EXTENDED RELEASE ORAL DAILY
Qty: 30 TABLET | Refills: 11 | Status: SHIPPED
Start: 2024-01-10 | End: 2024-02-14

## 2024-01-10 NOTE — LETTER
[facility] - OPERATED BY [address] [id number] [phone]     January 10, 2024    Patient: Lisa Arevalo   YOB: 1980   Date of Visit: 1/10/2024       To Whom It May Concern:    Lisa Arevalo was seen and treated in our department on 1/10/2024.     She should be excused from work for 2 weeks.  She can return to work 1/24/2023 with no restrictions.      Sincerely,     Teofilo Hwang M.D.

## 2024-01-11 ENCOUNTER — NON-PROVIDER VISIT (OUTPATIENT)
Dept: CARDIOLOGY | Facility: MEDICAL CENTER | Age: 44
End: 2024-01-11
Attending: INTERNAL MEDICINE
Payer: COMMERCIAL

## 2024-01-11 DIAGNOSIS — I49.1 APC (ATRIAL PREMATURE CONTRACTIONS): ICD-10-CM

## 2024-01-11 DIAGNOSIS — R00.2 PALPITATIONS: ICD-10-CM

## 2024-01-11 DIAGNOSIS — R00.0 SINUS TACHYCARDIA: ICD-10-CM

## 2024-01-11 DIAGNOSIS — I49.3 PVC'S (PREMATURE VENTRICULAR CONTRACTIONS): ICD-10-CM

## 2024-01-11 LAB
LV EJECT FRACT  99904: 55
LV EJECT FRACT MOD 2C 99903: 60.77
LV EJECT FRACT MOD 4C 99902: 48.23
LV EJECT FRACT MOD BP 99901: 54.63

## 2024-01-11 PROCEDURE — 93246 EXT ECG>7D<15D RECORDING: CPT

## 2024-01-11 PROCEDURE — 93306 TTE W/DOPPLER COMPLETE: CPT | Mod: 26 | Performed by: INTERNAL MEDICINE

## 2024-01-11 PROCEDURE — 93306 TTE W/DOPPLER COMPLETE: CPT

## 2024-01-11 NOTE — PROGRESS NOTES
"    Interventional cardiology Initial Consultation Note      Chief Complaint: Palpitations    Lisa Arevalo is a 43 y.o. female  patient with longstanding history of hypothyroidism with worsening palpitations, chest pains.  She has palpitations for a long time but recently they have been worse, associated with significant chest pain.  She also complains of shortness of breath, fatigue, anxiety.  She had COVID last year.        Past Medical History:   Diagnosis Date    Arrhythmia     palpitations    Cold     Fibromyalgia     Heart burn     Hyperthyroidism 2010    status post thyroidectomy    Hypothyroidism, postsurgical 2010    Indigestion     Interstitial cystitis     Migraine     Renal disorder     intertstital cystitis             Current Outpatient Medications   Medication Sig Dispense Refill    metoprolol SR (TOPROL XL) 25 MG TABLET SR 24 HR Take 0.5 Tablets by mouth every day. 30 Tablet 11    levothyroxine (SYNTHROID) 100 MCG Tab TAKE ONE TABLET BY MOUTH EVERY MORNING ON AN EMPTY STOMACH  (Patient taking differently: Take 100 mcg by mouth every morning on an empty stomach. \" 88 mg a few days\") 90 tablet 1    Prenatal MV-Min-Fe Fum-FA-DHA (PRENATAL 1 PO) Take  by mouth.       No current facility-administered medications for this visit.             Physical Exam:  Ambulatory Vitals  /72 (BP Location: Left arm, Patient Position: Sitting, BP Cuff Size: Adult)   Pulse 87   Resp 16   Ht 1.727 m (5' 8\")   Wt 64.9 kg (143 lb)   SpO2 99%    Oxygen Therapy:  Pulse Oximetry: 99 %  BP Readings from Last 4 Encounters:   01/10/24 116/72   02/28/19 104/70   08/26/18 102/77   07/13/18 110/67       Weight/BMI: Body mass index is 21.74 kg/m².  Wt Readings from Last 4 Encounters:   01/10/24 64.9 kg (143 lb)   12/16/22 61.2 kg (135 lb)   02/28/19 59.9 kg (132 lb)   08/24/18 64.9 kg (143 lb)           General: Well appearing and in no apparent distress  Neck: carotid bruits absent  Lungs: respiratory sounds  " normal  Heart: Regular rhythm,  No palpable thrills on palpation, murmurs absent, no rubs,   Lower extremity edema absent.       EKG sinus rhythm, borderline repolarization abnormality.      Medical Decision Making:  Problem List Items Addressed This Visit    None  Visit Diagnoses       Paroxysmal supraventricular tachycardia        Relevant Medications    metoprolol SR (TOPROL XL) 25 MG TABLET SR 24 HR    Other Relevant Orders    EKG (Completed)    Palpitations        Relevant Medications    metoprolol SR (TOPROL XL) 25 MG TABLET SR 24 HR    Other Relevant Orders    EC-ECHOCARDIOGRAM COMPLETE W/O CONT    Cardiac Event Monitor          She has symptomatic intermittent palpitations, chest pain.  Recommend further evaluation with Holter monitor, echocardiogram.  Recommend low-dose beta-blocker.  Her thyroid medicine has been adjusted, she was hyperthyroid.      Follow-up in 6 weeks to check on symptoms, review test results.    This note was dictated using Dragon speech recognition software.    Teofilo CRESPO  Interventional cardiologist  [facility] [address]  [address] [address]  Phone: [phone]  Fax: [phone]

## 2024-01-12 ENCOUNTER — TELEPHONE (OUTPATIENT)
Dept: CARDIOLOGY | Facility: MEDICAL CENTER | Age: 44
End: 2024-01-12
Payer: COMMERCIAL

## 2024-01-12 NOTE — TELEPHONE ENCOUNTER
----- Message from Teofilo Hwang M.D. sent at 1/11/2024  4:18 PM PST -----  Let her know her echo looks good, she has mild mitral regurgitation but not of a concern at this time, not causing her symptoms

## 2024-01-15 ENCOUNTER — PATIENT MESSAGE (OUTPATIENT)
Dept: CARDIOLOGY | Facility: MEDICAL CENTER | Age: 44
End: 2024-01-15
Payer: COMMERCIAL

## 2024-01-25 ENCOUNTER — TELEPHONE (OUTPATIENT)
Dept: CARDIOLOGY | Facility: MEDICAL CENTER | Age: 44
End: 2024-01-25
Payer: COMMERCIAL

## 2024-01-25 PROCEDURE — 93244 EXT ECG>48HR<7D REV&INTERPJ: CPT | Performed by: INTERNAL MEDICINE

## 2024-02-07 ENCOUNTER — HOSPITAL ENCOUNTER (OUTPATIENT)
Dept: LAB | Facility: MEDICAL CENTER | Age: 44
End: 2024-02-07
Attending: FAMILY MEDICINE
Payer: COMMERCIAL

## 2024-02-07 LAB
T3 SERPL-MCNC: 90.3 NG/DL (ref 60–181)
T4 FREE SERPL-MCNC: 1.64 NG/DL (ref 0.93–1.7)
TSH SERPL DL<=0.005 MIU/L-ACNC: 0.87 UIU/ML (ref 0.38–5.33)

## 2024-02-07 PROCEDURE — 36415 COLL VENOUS BLD VENIPUNCTURE: CPT

## 2024-02-07 PROCEDURE — 84443 ASSAY THYROID STIM HORMONE: CPT

## 2024-02-07 PROCEDURE — 84480 ASSAY TRIIODOTHYRONINE (T3): CPT

## 2024-02-07 PROCEDURE — 84439 ASSAY OF FREE THYROXINE: CPT

## 2024-02-14 ENCOUNTER — OFFICE VISIT (OUTPATIENT)
Dept: CARDIOLOGY | Facility: MEDICAL CENTER | Age: 44
End: 2024-02-14
Attending: INTERNAL MEDICINE
Payer: COMMERCIAL

## 2024-02-14 VITALS
HEART RATE: 74 BPM | DIASTOLIC BLOOD PRESSURE: 64 MMHG | RESPIRATION RATE: 16 BRPM | HEIGHT: 68 IN | BODY MASS INDEX: 21.98 KG/M2 | SYSTOLIC BLOOD PRESSURE: 114 MMHG | WEIGHT: 145 LBS | OXYGEN SATURATION: 100 %

## 2024-02-14 DIAGNOSIS — R00.2 PALPITATIONS: ICD-10-CM

## 2024-02-14 PROCEDURE — 3078F DIAST BP <80 MM HG: CPT | Performed by: INTERNAL MEDICINE

## 2024-02-14 PROCEDURE — 99211 OFF/OP EST MAY X REQ PHY/QHP: CPT | Performed by: INTERNAL MEDICINE

## 2024-02-14 PROCEDURE — 3074F SYST BP LT 130 MM HG: CPT | Performed by: INTERNAL MEDICINE

## 2024-02-14 PROCEDURE — 99213 OFFICE O/P EST LOW 20 MIN: CPT | Performed by: INTERNAL MEDICINE

## 2024-02-14 RX ORDER — PROPRANOLOL HYDROCHLORIDE 20 MG/1
20 TABLET ORAL 3 TIMES DAILY
Qty: 90 TABLET | Refills: 11 | Status: SHIPPED | OUTPATIENT
Start: 2024-02-14

## 2024-02-14 NOTE — PROGRESS NOTES
Chief Complaint   Patient presents with    Supraventricular Tachycardia (SVT)     F/V Dx: Paroxysmal supraventricular tachycardia       Subjective     Lisa Arevalo is a 43 y.o. female who presents today for follow-up of her history of likely PSVT in the setting of hypothyroidism with a history of Graves' disease    Her heart testing has been reassuring but she does describe very typical symptoms for PSVT    Past Medical History:   Diagnosis Date    Arrhythmia     palpitations    Cold     Fibromyalgia     Heart burn     Hyperthyroidism 2010    status post thyroidectomy    Hypothyroidism, postsurgical 2010    Indigestion     Interstitial cystitis     Migraine     Renal disorder     intertstital cystitis     Past Surgical History:   Procedure Laterality Date    THYROIDECTOMY TOTAL  9/29/2010    Performed by JUSTIN CLAYTON at SURGERY SAME DAY [facility] ORS    TONSILLECTOMY AND ADENOIDECTOMY  1985    MASTOIDECTOMY      MYRINGOTOMY      OTHER      placement of Anne Cath times 2    OTHER ORTHOPEDIC SURGERY      broken left arm     Family History   Problem Relation Age of Onset    Hypertension Father     Hypertension Brother     Alcohol/Drug Brother     Cancer Maternal Grandmother         breast, colon     Social History     Socioeconomic History    Marital status:      Spouse name: Not on file    Number of children: Not on file    Years of education: Not on file    Highest education level: Not on file   Occupational History    Not on file   Tobacco Use    Smoking status: Never    Smokeless tobacco: Never   Substance and Sexual Activity    Alcohol use: Yes     Alcohol/week: 0.0 oz     Comment: rare    Drug use: No    Sexual activity: Yes     Birth control/protection: I.U.D.     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   Other Topics Concern    Not on file   Social History Narrative    Not on file     Social Determinants of Health     Financial Resource Strain: Not on file   Food Insecurity: Not on  "file   Transportation Needs: Not on file   Physical Activity: Not on file   Stress: Not on file   Social Connections: Not on file   Intimate Partner Violence: Not on file   Housing Stability: Not on file     Allergies   Allergen Reactions    Macrobid [Nitrofurantoin Monohydrate Macrocrystals]      Chest problems    Minocycline Hives    Neurontin [Gabapentin]      Slurry speechj    Rocephin [Ceftriaxone Sodium] Hives    Tapazole [Thiamazole] Hives    Gluten Meal Diarrhea and Vomiting     All gluten products    Propylthiouracil      Outpatient Encounter Medications as of 2/14/2024   Medication Sig Dispense Refill    levothyroxine (SYNTHROID) 100 MCG Tab TAKE ONE TABLET BY MOUTH EVERY MORNING ON AN EMPTY STOMACH  (Patient taking differently: Take 100 mcg by mouth every morning on an empty stomach. \" 88 mg a few days\") 90 tablet 1    Prenatal MV-Min-Fe Fum-FA-DHA (PRENATAL 1 PO) Take  by mouth.      metoprolol SR (TOPROL XL) 25 MG TABLET SR 24 HR Take 0.5 Tablets by mouth every day. (Patient not taking: Reported on 2/14/2024) 30 Tablet 11     No facility-administered encounter medications on file as of 2/14/2024.     ROS           Objective     /64 (BP Location: Left arm, Patient Position: Sitting, BP Cuff Size: Adult)   Pulse 74   Resp 16   Ht 1.727 m (5' 8\")   Wt 65.8 kg (145 lb)   SpO2 100%   BMI 22.05 kg/m²     Physical Exam  Constitutional:       General: She is not in acute distress.     Appearance: She is not diaphoretic.   Eyes:      General: No scleral icterus.  Neck:      Vascular: No JVD.   Cardiovascular:      Rate and Rhythm: Normal rate.      Heart sounds: Normal heart sounds. No murmur heard.     No friction rub. No gallop.   Pulmonary:      Effort: No respiratory distress.      Breath sounds: No wheezing or rales.   Abdominal:      General: Bowel sounds are normal.      Palpations: Abdomen is soft.   Musculoskeletal:      Right lower leg: No edema.      Left lower leg: No edema.   Skin:     " Findings: No rash.   Neurological:      Mental Status: She is alert. Mental status is at baseline.   Psychiatric:         Mood and Affect: Mood normal.                Assessment & Plan     1. Palpitations  propranolol (INDERAL) 20 MG Tab          Medical Decision Making: Today's Assessment/Status/Plan:        It was my pleasure to meet with Ms. Arevalo.    Blood pressure is well controlled.  She will continue to monitor and eat hearty healthy diet.    Trial of propanolol for palps    She will continue to work to optimize her thyroid treatment I suspect her sense of forceful heartbeats with normal sinus rhythm can be a side effect of the thyroid    Ms. Arevalo does not require regular cardiology follow up, I have advised her to call our office or e-mail using my [employer] if needed.    It is my pleasure to participate in the care of Ms. Arevalo.  Please do not hesitate to contact me with questions or concerns.    Chaim Sigala MD PhD [facility]  Cardiologist [facility]    Please note that this dictation was created using voice recognition software. There may be errors I did not discover before finalizing the note.

## 2024-02-15 NOTE — PATIENT INSTRUCTIONS
Consider [employer] ([email or web address][email or web address]/) $ DO NOT SUBSCRIBE WE WILL ALWAYS REVIEW YOUR TRACINGS ON [employer] or Smartwatch such as Apple Watch $$$$ for monitoring of the heart palpitations.  This is a small device that syncs to your phone with Bluetooth that can tell you your heart rhythm.  You can send us a screencapture of the tracings with [employer].        Typical Patch Monitor looks like this [employer] or [employer]      Alternatively consider a pulse oximeter and let us know if Oxygen is <90 or pulse is <50 or > 110 while resting

## 2024-03-30 ENCOUNTER — HOSPITAL ENCOUNTER (OUTPATIENT)
Dept: LAB | Facility: MEDICAL CENTER | Age: 44
End: 2024-03-30
Attending: FAMILY MEDICINE
Payer: COMMERCIAL

## 2024-03-30 LAB
ALBUMIN SERPL BCP-MCNC: 4.2 G/DL (ref 3.2–4.9)
ALBUMIN/GLOB SERPL: 1.5 G/DL
ALP SERPL-CCNC: 62 U/L (ref 30–99)
ALT SERPL-CCNC: 7 U/L (ref 2–50)
ANION GAP SERPL CALC-SCNC: 10 MMOL/L (ref 7–16)
AST SERPL-CCNC: 14 U/L (ref 12–45)
BASOPHILS # BLD AUTO: 1.6 % (ref 0–1.8)
BASOPHILS # BLD: 0.07 K/UL (ref 0–0.12)
BILIRUB SERPL-MCNC: 1.4 MG/DL (ref 0.1–1.5)
BUN SERPL-MCNC: 6 MG/DL (ref 8–22)
CALCIUM ALBUM COR SERPL-MCNC: 8.9 MG/DL (ref 8.5–10.5)
CALCIUM SERPL-MCNC: 9.1 MG/DL (ref 8.5–10.5)
CHLORIDE SERPL-SCNC: 105 MMOL/L (ref 96–112)
CHOLEST SERPL-MCNC: 133 MG/DL (ref 100–199)
CO2 SERPL-SCNC: 23 MMOL/L (ref 20–33)
CREAT SERPL-MCNC: 0.9 MG/DL (ref 0.5–1.4)
EOSINOPHIL # BLD AUTO: 0.11 K/UL (ref 0–0.51)
EOSINOPHIL NFR BLD: 2.5 % (ref 0–6.9)
ERYTHROCYTE [DISTWIDTH] IN BLOOD BY AUTOMATED COUNT: 45 FL (ref 35.9–50)
GFR SERPLBLD CREATININE-BSD FMLA CKD-EPI: 82 ML/MIN/1.73 M 2
GLOBULIN SER CALC-MCNC: 2.8 G/DL (ref 1.9–3.5)
GLUCOSE SERPL-MCNC: 88 MG/DL (ref 65–99)
HCT VFR BLD AUTO: 37.2 % (ref 37–47)
HDLC SERPL-MCNC: 55 MG/DL
HGB BLD-MCNC: 11.3 G/DL (ref 12–16)
IMM GRANULOCYTES # BLD AUTO: 0 K/UL (ref 0–0.11)
IMM GRANULOCYTES NFR BLD AUTO: 0 % (ref 0–0.9)
LDLC SERPL CALC-MCNC: 66 MG/DL
LYMPHOCYTES # BLD AUTO: 1.65 K/UL (ref 1–4.8)
LYMPHOCYTES NFR BLD: 37.8 % (ref 22–41)
MCH RBC QN AUTO: 24.1 PG (ref 27–33)
MCHC RBC AUTO-ENTMCNC: 30.4 G/DL (ref 32.2–35.5)
MCV RBC AUTO: 79.3 FL (ref 81.4–97.8)
MONOCYTES # BLD AUTO: 0.27 K/UL (ref 0–0.85)
MONOCYTES NFR BLD AUTO: 6.2 % (ref 0–13.4)
NEUTROPHILS # BLD AUTO: 2.27 K/UL (ref 1.82–7.42)
NEUTROPHILS NFR BLD: 51.9 % (ref 44–72)
NRBC # BLD AUTO: 0 K/UL
NRBC BLD-RTO: 0 /100 WBC (ref 0–0.2)
PLATELET # BLD AUTO: 242 K/UL (ref 164–446)
PMV BLD AUTO: 10.5 FL (ref 9–12.9)
POTASSIUM SERPL-SCNC: 4.6 MMOL/L (ref 3.6–5.5)
PROT SERPL-MCNC: 7 G/DL (ref 6–8.2)
RBC # BLD AUTO: 4.69 M/UL (ref 4.2–5.4)
SODIUM SERPL-SCNC: 138 MMOL/L (ref 135–145)
T3 SERPL-MCNC: 97.4 NG/DL (ref 60–181)
T4 FREE SERPL-MCNC: 1.63 NG/DL (ref 0.93–1.7)
TRIGL SERPL-MCNC: 61 MG/DL (ref 0–149)
TSH SERPL DL<=0.005 MIU/L-ACNC: 2.34 UIU/ML (ref 0.38–5.33)
WBC # BLD AUTO: 4.4 K/UL (ref 4.8–10.8)

## 2024-03-30 PROCEDURE — 84439 ASSAY OF FREE THYROXINE: CPT

## 2024-03-30 PROCEDURE — 80053 COMPREHEN METABOLIC PANEL: CPT

## 2024-03-30 PROCEDURE — 84443 ASSAY THYROID STIM HORMONE: CPT

## 2024-03-30 PROCEDURE — 80061 LIPID PANEL: CPT

## 2024-03-30 PROCEDURE — 36415 COLL VENOUS BLD VENIPUNCTURE: CPT

## 2024-03-30 PROCEDURE — 84480 ASSAY TRIIODOTHYRONINE (T3): CPT

## 2024-03-30 PROCEDURE — 85025 COMPLETE CBC W/AUTO DIFF WBC: CPT

## 2024-04-02 ENCOUNTER — HOSPITAL ENCOUNTER (OUTPATIENT)
Dept: LAB | Facility: MEDICAL CENTER | Age: 44
End: 2024-04-02
Attending: FAMILY MEDICINE
Payer: COMMERCIAL

## 2024-04-02 LAB
BASOPHILS # BLD AUTO: 0.8 % (ref 0–1.8)
BASOPHILS # BLD: 0.06 K/UL (ref 0–0.12)
EOSINOPHIL # BLD AUTO: 0.09 K/UL (ref 0–0.51)
EOSINOPHIL NFR BLD: 1.2 % (ref 0–6.9)
ERYTHROCYTE [DISTWIDTH] IN BLOOD BY AUTOMATED COUNT: 45.9 FL (ref 35.9–50)
FERRITIN SERPL-MCNC: 7 NG/ML (ref 10–291)
FOLATE SERPL-MCNC: 14.1 NG/ML
HCT VFR BLD AUTO: 37.6 % (ref 37–47)
HGB BLD-MCNC: 11.3 G/DL (ref 12–16)
IMM GRANULOCYTES # BLD AUTO: 0.01 K/UL (ref 0–0.11)
IMM GRANULOCYTES NFR BLD AUTO: 0.1 % (ref 0–0.9)
IRON SATN MFR SERPL: 11 % (ref 15–55)
IRON SERPL-MCNC: 43 UG/DL (ref 40–170)
LYMPHOCYTES # BLD AUTO: 2.13 K/UL (ref 1–4.8)
LYMPHOCYTES NFR BLD: 29.1 % (ref 22–41)
MCH RBC QN AUTO: 24 PG (ref 27–33)
MCHC RBC AUTO-ENTMCNC: 30.1 G/DL (ref 32.2–35.5)
MCV RBC AUTO: 79.8 FL (ref 81.4–97.8)
MONOCYTES # BLD AUTO: 0.47 K/UL (ref 0–0.85)
MONOCYTES NFR BLD AUTO: 6.4 % (ref 0–13.4)
NEUTROPHILS # BLD AUTO: 4.57 K/UL (ref 1.82–7.42)
NEUTROPHILS NFR BLD: 62.4 % (ref 44–72)
NRBC # BLD AUTO: 0 K/UL
NRBC BLD-RTO: 0 /100 WBC (ref 0–0.2)
PLATELET # BLD AUTO: 259 K/UL (ref 164–446)
PMV BLD AUTO: 10.7 FL (ref 9–12.9)
RBC # BLD AUTO: 4.71 M/UL (ref 4.2–5.4)
TIBC SERPL-MCNC: 377 UG/DL (ref 250–450)
UIBC SERPL-MCNC: 334 UG/DL (ref 110–370)
VIT B12 SERPL-MCNC: 634 PG/ML (ref 211–911)
WBC # BLD AUTO: 7.3 K/UL (ref 4.8–10.8)

## 2024-04-02 PROCEDURE — 82607 VITAMIN B-12: CPT

## 2024-04-02 PROCEDURE — 82746 ASSAY OF FOLIC ACID SERUM: CPT

## 2024-04-02 PROCEDURE — 83550 IRON BINDING TEST: CPT

## 2024-04-02 PROCEDURE — 85025 COMPLETE CBC W/AUTO DIFF WBC: CPT

## 2024-04-02 PROCEDURE — 82728 ASSAY OF FERRITIN: CPT

## 2024-04-02 PROCEDURE — 36415 COLL VENOUS BLD VENIPUNCTURE: CPT

## 2024-04-02 PROCEDURE — 83540 ASSAY OF IRON: CPT

## 2024-08-30 ENCOUNTER — HOSPITAL ENCOUNTER (OUTPATIENT)
Dept: LAB | Facility: MEDICAL CENTER | Age: 44
End: 2024-08-30
Attending: PSYCHIATRY & NEUROLOGY
Payer: COMMERCIAL

## 2024-08-30 LAB
25(OH)D3 SERPL-MCNC: 60 NG/ML (ref 30–100)
BASOPHILS # BLD AUTO: 1.8 % (ref 0–1.8)
BASOPHILS # BLD AUTO: 2 % (ref 0–1.8)
BASOPHILS # BLD: 0.09 K/UL (ref 0–0.12)
BASOPHILS # BLD: 0.1 K/UL (ref 0–0.12)
CRP SERPL HS-MCNC: <0.3 MG/DL (ref 0–0.75)
EOSINOPHIL # BLD AUTO: 0.17 K/UL (ref 0–0.51)
EOSINOPHIL # BLD AUTO: 0.17 K/UL (ref 0–0.51)
EOSINOPHIL NFR BLD: 3.3 % (ref 0–6.9)
EOSINOPHIL NFR BLD: 3.4 % (ref 0–6.9)
ERYTHROCYTE [DISTWIDTH] IN BLOOD BY AUTOMATED COUNT: 41.2 FL (ref 35.9–50)
ERYTHROCYTE [DISTWIDTH] IN BLOOD BY AUTOMATED COUNT: 42.4 FL (ref 35.9–50)
ERYTHROCYTE [SEDIMENTATION RATE] IN BLOOD BY WESTERGREN METHOD: 4 MM/HOUR (ref 0–25)
FERRITIN SERPL-MCNC: 21.8 NG/ML (ref 10–291)
FERRITIN SERPL-MCNC: 21.9 NG/ML (ref 10–291)
HCT VFR BLD AUTO: 45.1 % (ref 37–47)
HCT VFR BLD AUTO: 45.7 % (ref 37–47)
HGB BLD-MCNC: 15.3 G/DL (ref 12–16)
HGB BLD-MCNC: 15.6 G/DL (ref 12–16)
IMM GRANULOCYTES # BLD AUTO: 0.01 K/UL (ref 0–0.11)
IMM GRANULOCYTES # BLD AUTO: 0.01 K/UL (ref 0–0.11)
IMM GRANULOCYTES NFR BLD AUTO: 0.2 % (ref 0–0.9)
IMM GRANULOCYTES NFR BLD AUTO: 0.2 % (ref 0–0.9)
IRON SATN MFR SERPL: 49 % (ref 15–55)
IRON SATN MFR SERPL: 50 % (ref 15–55)
IRON SERPL-MCNC: 142 UG/DL (ref 40–170)
IRON SERPL-MCNC: 143 UG/DL (ref 40–170)
LYMPHOCYTES # BLD AUTO: 1.67 K/UL (ref 1–4.8)
LYMPHOCYTES # BLD AUTO: 1.67 K/UL (ref 1–4.8)
LYMPHOCYTES NFR BLD: 32.7 % (ref 22–41)
LYMPHOCYTES NFR BLD: 33.8 % (ref 22–41)
MCH RBC QN AUTO: 32.1 PG (ref 27–33)
MCH RBC QN AUTO: 33.3 PG (ref 27–33)
MCHC RBC AUTO-ENTMCNC: 33.5 G/DL (ref 32.2–35.5)
MCHC RBC AUTO-ENTMCNC: 34.6 G/DL (ref 32.2–35.5)
MCV RBC AUTO: 96 FL (ref 81.4–97.8)
MCV RBC AUTO: 96.4 FL (ref 81.4–97.8)
MONOCYTES # BLD AUTO: 0.31 K/UL (ref 0–0.85)
MONOCYTES # BLD AUTO: 0.31 K/UL (ref 0–0.85)
MONOCYTES NFR BLD AUTO: 6.1 % (ref 0–13.4)
MONOCYTES NFR BLD AUTO: 6.3 % (ref 0–13.4)
NEUTROPHILS # BLD AUTO: 2.68 K/UL (ref 1.82–7.42)
NEUTROPHILS # BLD AUTO: 2.85 K/UL (ref 1.82–7.42)
NEUTROPHILS NFR BLD: 54.3 % (ref 44–72)
NEUTROPHILS NFR BLD: 55.9 % (ref 44–72)
NRBC # BLD AUTO: 0 K/UL
NRBC # BLD AUTO: 0 K/UL
NRBC BLD-RTO: 0 /100 WBC (ref 0–0.2)
NRBC BLD-RTO: 0 /100 WBC (ref 0–0.2)
PLATELET # BLD AUTO: 236 K/UL (ref 164–446)
PLATELET # BLD AUTO: 237 K/UL (ref 164–446)
PMV BLD AUTO: 10.5 FL (ref 9–12.9)
PMV BLD AUTO: 10.7 FL (ref 9–12.9)
RBC # BLD AUTO: 4.68 M/UL (ref 4.2–5.4)
RBC # BLD AUTO: 4.76 M/UL (ref 4.2–5.4)
T3 SERPL-MCNC: 82.4 NG/DL (ref 60–181)
T4 FREE SERPL-MCNC: 1.77 NG/DL (ref 0.93–1.7)
TIBC SERPL-MCNC: 288 UG/DL (ref 250–450)
TIBC SERPL-MCNC: 291 UG/DL (ref 250–450)
TSH SERPL-ACNC: 2.67 UIU/ML (ref 0.35–5.5)
UIBC SERPL-MCNC: 145 UG/DL (ref 110–370)
UIBC SERPL-MCNC: 149 UG/DL (ref 110–370)
WBC # BLD AUTO: 4.9 K/UL (ref 4.8–10.8)
WBC # BLD AUTO: 5.1 K/UL (ref 4.8–10.8)

## 2024-08-30 PROCEDURE — 83550 IRON BINDING TEST: CPT

## 2024-08-30 PROCEDURE — 84403 ASSAY OF TOTAL TESTOSTERONE: CPT

## 2024-08-30 PROCEDURE — 84443 ASSAY THYROID STIM HORMONE: CPT

## 2024-08-30 PROCEDURE — 82306 VITAMIN D 25 HYDROXY: CPT

## 2024-08-30 PROCEDURE — 86140 C-REACTIVE PROTEIN: CPT

## 2024-08-30 PROCEDURE — 85025 COMPLETE CBC W/AUTO DIFF WBC: CPT

## 2024-08-30 PROCEDURE — 84439 ASSAY OF FREE THYROXINE: CPT

## 2024-08-30 PROCEDURE — 84402 ASSAY OF FREE TESTOSTERONE: CPT

## 2024-08-30 PROCEDURE — 85025 COMPLETE CBC W/AUTO DIFF WBC: CPT | Mod: 91

## 2024-08-30 PROCEDURE — 84270 ASSAY OF SEX HORMONE GLOBUL: CPT

## 2024-08-30 PROCEDURE — 82728 ASSAY OF FERRITIN: CPT | Mod: 91

## 2024-08-30 PROCEDURE — 36415 COLL VENOUS BLD VENIPUNCTURE: CPT

## 2024-08-30 PROCEDURE — 83540 ASSAY OF IRON: CPT | Mod: 91

## 2024-08-30 PROCEDURE — 83550 IRON BINDING TEST: CPT | Mod: 91

## 2024-08-30 PROCEDURE — 85652 RBC SED RATE AUTOMATED: CPT

## 2024-08-30 PROCEDURE — 84480 ASSAY TRIIODOTHYRONINE (T3): CPT

## 2024-08-30 PROCEDURE — 82728 ASSAY OF FERRITIN: CPT

## 2024-08-30 PROCEDURE — 86038 ANTINUCLEAR ANTIBODIES: CPT

## 2024-08-30 PROCEDURE — 83540 ASSAY OF IRON: CPT

## 2024-09-02 LAB — NUCLEAR IGG SER QL IA: NORMAL

## 2024-09-08 LAB
SHBG SERPL-SCNC: 57 NMOL/L (ref 25–122)
TESTOST FREE SERPL-MCNC: 3 PG/ML (ref 1.1–5.8)
TESTOST SERPL-MCNC: 25 NG/DL (ref 9–55)

## 2025-03-14 ENCOUNTER — HOSPITAL ENCOUNTER (OUTPATIENT)
Dept: LAB | Facility: MEDICAL CENTER | Age: 45
End: 2025-03-14
Attending: NURSE PRACTITIONER
Payer: COMMERCIAL

## 2025-03-14 LAB
ALBUMIN SERPL BCP-MCNC: 4.3 G/DL (ref 3.2–4.9)
ALBUMIN/GLOB SERPL: 1.5 G/DL
ALP SERPL-CCNC: 70 U/L (ref 30–99)
ALT SERPL-CCNC: 20 U/L (ref 2–50)
ANION GAP SERPL CALC-SCNC: 10 MMOL/L (ref 7–16)
AST SERPL-CCNC: 19 U/L (ref 12–45)
BASOPHILS # BLD AUTO: 1.7 % (ref 0–1.8)
BASOPHILS # BLD: 0.09 K/UL (ref 0–0.12)
BILIRUB SERPL-MCNC: 1.9 MG/DL (ref 0.1–1.5)
BUN SERPL-MCNC: 7 MG/DL (ref 8–22)
CALCIUM ALBUM COR SERPL-MCNC: 9.1 MG/DL (ref 8.5–10.5)
CALCIUM SERPL-MCNC: 9.3 MG/DL (ref 8.5–10.5)
CHLORIDE SERPL-SCNC: 105 MMOL/L (ref 96–112)
CO2 SERPL-SCNC: 23 MMOL/L (ref 20–33)
CREAT SERPL-MCNC: 0.96 MG/DL (ref 0.5–1.4)
EOSINOPHIL # BLD AUTO: 0.11 K/UL (ref 0–0.51)
EOSINOPHIL NFR BLD: 2.1 % (ref 0–6.9)
ERYTHROCYTE [DISTWIDTH] IN BLOOD BY AUTOMATED COUNT: 41.9 FL (ref 35.9–50)
FERRITIN SERPL-MCNC: 41.8 NG/ML (ref 10–291)
GFR SERPLBLD CREATININE-BSD FMLA CKD-EPI: 76 ML/MIN/1.73 M 2
GLOBULIN SER CALC-MCNC: 2.8 G/DL (ref 1.9–3.5)
GLUCOSE SERPL-MCNC: 79 MG/DL (ref 65–99)
HCT VFR BLD AUTO: 48.4 % (ref 37–47)
HGB BLD-MCNC: 16.1 G/DL (ref 12–16)
IMM GRANULOCYTES # BLD AUTO: 0.01 K/UL (ref 0–0.11)
IMM GRANULOCYTES NFR BLD AUTO: 0.2 % (ref 0–0.9)
IRON SATN MFR SERPL: 49 % (ref 15–55)
IRON SERPL-MCNC: 135 UG/DL (ref 40–170)
LYMPHOCYTES # BLD AUTO: 1.58 K/UL (ref 1–4.8)
LYMPHOCYTES NFR BLD: 29.9 % (ref 22–41)
MCH RBC QN AUTO: 31.9 PG (ref 27–33)
MCHC RBC AUTO-ENTMCNC: 33.3 G/DL (ref 32.2–35.5)
MCV RBC AUTO: 96 FL (ref 81.4–97.8)
MONOCYTES # BLD AUTO: 0.29 K/UL (ref 0–0.85)
MONOCYTES NFR BLD AUTO: 5.5 % (ref 0–13.4)
NEUTROPHILS # BLD AUTO: 3.2 K/UL (ref 1.82–7.42)
NEUTROPHILS NFR BLD: 60.6 % (ref 44–72)
NRBC # BLD AUTO: 0 K/UL
NRBC BLD-RTO: 0 /100 WBC (ref 0–0.2)
PLATELET # BLD AUTO: 275 K/UL (ref 164–446)
PMV BLD AUTO: 10.3 FL (ref 9–12.9)
POTASSIUM SERPL-SCNC: 4.5 MMOL/L (ref 3.6–5.5)
PROT SERPL-MCNC: 7.1 G/DL (ref 6–8.2)
RBC # BLD AUTO: 5.04 M/UL (ref 4.2–5.4)
SODIUM SERPL-SCNC: 138 MMOL/L (ref 135–145)
T3FREE SERPL-MCNC: 2.49 PG/ML (ref 2–4.4)
TIBC SERPL-MCNC: 274 UG/DL (ref 250–450)
TSH SERPL-ACNC: 6.9 UIU/ML (ref 0.35–5.5)
UIBC SERPL-MCNC: 139 UG/DL (ref 110–370)
WBC # BLD AUTO: 5.3 K/UL (ref 4.8–10.8)

## 2025-03-14 PROCEDURE — 36415 COLL VENOUS BLD VENIPUNCTURE: CPT

## 2025-03-14 PROCEDURE — 82728 ASSAY OF FERRITIN: CPT

## 2025-03-14 PROCEDURE — 84443 ASSAY THYROID STIM HORMONE: CPT

## 2025-03-14 PROCEDURE — 83550 IRON BINDING TEST: CPT

## 2025-03-14 PROCEDURE — 80053 COMPREHEN METABOLIC PANEL: CPT

## 2025-03-14 PROCEDURE — 84481 FREE ASSAY (FT-3): CPT

## 2025-03-14 PROCEDURE — 85025 COMPLETE CBC W/AUTO DIFF WBC: CPT

## 2025-03-14 PROCEDURE — 84439 ASSAY OF FREE THYROXINE: CPT

## 2025-03-14 PROCEDURE — 83540 ASSAY OF IRON: CPT

## 2025-03-19 LAB — T4 FREE SERPL DIALY-MCNC: 1.9 NG/DL (ref 1.1–2.4)

## 2025-04-16 ENCOUNTER — PHYSICAL THERAPY (OUTPATIENT)
Dept: PHYSICAL THERAPY | Facility: MEDICAL CENTER | Age: 45
End: 2025-04-16
Attending: OTOLARYNGOLOGY
Payer: COMMERCIAL

## 2025-04-16 DIAGNOSIS — R42 VERTIGO: ICD-10-CM

## 2025-04-16 PROCEDURE — 97112 NEUROMUSCULAR REEDUCATION: CPT

## 2025-04-16 PROCEDURE — 97162 PT EVAL MOD COMPLEX 30 MIN: CPT

## 2025-04-16 ASSESSMENT — ENCOUNTER SYMPTOMS: MIGRAINE HEADACHES: 1

## 2025-04-16 NOTE — OP THERAPY EVALUATION
"  Outpatient Physical Therapy  VESTIBULAR EVALUATION    [facility] Outpatient Physical Therapy  [address]  [facility] [address]  Phone:  [phone]  Fax:  [phone]    Date of Evaluation: 04/16/2025    Patient: Lisa Arevalo  YOB: 1980  MRN: [id number]     Referring Provider: Ángel Osorio M.D.  [address]  [place] [address]   Referring Diagnosis: Dizziness and giddiness [R42]     Time Calculation    Start time: 0953  Stop time: 1050 Time Calculation (min): 57 minutes           Chief Complaint: Vertigo    Visit Diagnoses     ICD-10-CM   1. Vertigo  R42         History of Present Illness:     Mechanism of injury:    Lisa presents to PT for support the management of her dizziness as referred by ENT. Chart notes send with this referral indicate pertinent PMH of mild NOEMY, chronic otitis media and eustachian tube dysfunction with use of nasal sprays as directed. Further PMH is listed below.     Lisa describes a long history of dizziness on and off. The first episode dating back to 2015; dx'd as MdDs after a cruise, lasted for about a year and a half. Another episode after a day of boating that lasted about 6 months and a third episode that occurred after a flight and lasted about 2-3 months. Reports increased frequency of triggers over the last 3 years, now not just related to travel. Has had flares related to anemia, a muscle relaxer, a steroid. These are shorter lasting, but never returning to 100%. Now having more persistent visual motion sensitivity (unable to scroll phone except for a short time, unable to watch TV or read). \"I can get by because I have to work, but it is not fun.\"  Reports \"when I feel pressure in the R ear I know that I am overdoing it and I have to stop that activity.\" Reports the symptoms are often worst first thing in the morning after the transition from horizontal to vertical. In the morning, she " can often have a pounding/pulsing sensation in the body (more intense on the R side), as well as pressure/strain across the eyes.     Tends to feel better in the middle of the day. Symptoms improve with distraction, weighted blanket and frequent breaks through the day. No medication for the dizziness.     Prior level of function:  Credentially part time, mostly from home. One Son, 6 years old. Is walking most days of the week, sometimes multiple walks in a day. 10-30 mins at a time.    Headaches: migraine headaches   (Does have hx of migraines in her youth, occular. Does have night sweats now)    Ear problems:  Ear ache and ringing (Pain in the R, tinnitus in the L, noise in the R ear (staticy, fan like).)  Social Support:     Lives with:  Spouse and young children  Diagnostic Tests:     Diagnostic tests comments:    CT scan: severely deviated septum to the L with bilateral inferior turbinate hypertrophy.     MRI 8/2024 impression: No acute intracranial process. Chronic sinusitis.     Audiogram indicated high frequency hearing loss in the R ear.  Treatments:     Previous treatment:  Physical therapy (Had about 4-5 visits in [place], but transferring here due to closer location. Some tasks were provoking, so relief with standing EC HT with vision spotting after transition)  Patient goals:     Other patient goals:  Resolve dizziness      Past Medical History:   Diagnosis Date    Arrhythmia     palpitations    Cold     Fibromyalgia     Heart burn     Hyperthyroidism 2010    status post thyroidectomy    Hypothyroidism, postsurgical 2010    Indigestion     Interstitial cystitis     Migraine     Renal disorder     intertstital cystitis     Past Surgical History:   Procedure Laterality Date    THYROIDECTOMY TOTAL  9/29/2010    Performed by JUSTIN CLAYTON at SURGERY SAME DAY [facility] ORS    TONSILLECTOMY AND ADENOIDECTOMY  1985    MASTOIDECTOMY      MYRINGOTOMY      OTHER      placement of Anne Cath times 2     OTHER ORTHOPEDIC SURGERY      broken left arm     Social History     Tobacco Use    Smoking status: Never    Smokeless tobacco: Never   Substance Use Topics    Alcohol use: Yes     Alcohol/week: 0.0 oz     Comment: rare     Family and Occupational History     Socioeconomic History    Marital status:      Spouse name: Not on file    Number of children: Not on file    Years of education: Not on file    Highest education level: Not on file   Occupational History    Not on file       Objective:    Gait:     Assessment: difficulty with concurrent head rotation  Vestibulospinal Exam:     Comments: Deferred due to insufficient time  Oculomotor Exam:         Details: No spontaneous nystagmus central gaze          Details: No spontaneous nystagmus eccentric gaze    Saccadic eye movements (Blinks with each transition. Causes nausea and flushing, horiz+vertical.)        Details: hypometria    Smooth pursuit present (Mild symptoms)    Convergence:        Normal convergence 4 cm    Comments:   Fixation blocked gaze holds are WNL  Limb Ataxia Exam:     Finger-to-Nose:         Intention tremor: none    Dysdiadochokinesia: none.  Strength Exam:     Upper extremities within functional limits    Lower extremities within functional limits  Reflex Exam:       Deep Tendon Reflexes:         Left L4 (Patellar): brisk (3+)        Right L4 (Patellar): brisk (3+)    Comments:   B C5/C6 2/4 ea  Vestibulo-Ocular Exam:     Abnormal head thrust test: NT.  BPPV Exam:     Comments: Declines for today. Reports has tested negative in the past  Breathing-Related Tests:     Normal Valsalva test        Therapeutic Treatments and Modalities:     1. Neuromuscular Re-education (CPT 58996), Education regarding vestibular dysfunction, answered questions regarding features of vestibular migraine. Discussed lifestyle factors that impact dizziness: activity level, stress, hydration, nutrition, sleep hygiene, etc. Discussed suppression vs grounding  "concepts as well as considerations for \"task complexity.\" HEP developed with return demo and HO provided.    Therapeutic Treatment and Modalities Summary:   Access Code: [email or web address]  URL: [email or web address]  Date: 04/16/2025  Prepared by:     Exercises  - Supine Bridge  - 5 x weekly - 20 reps - 5 sec hold  - Hooklying Sequential Leg March and Lower  - 5 x weekly - 20 reps - 5 sec hold  - Supine Deep Neck Flexor Training  - 5 x weekly - 2-4 sets - 10-30 second hold    Time-based treatments/modalities:    Physical Therapy Timed Treatment Charges  Neuromusc re-ed, balance, coor, post minutes (CPT 43719): 15 minutes        Assessment:     Functional impairments:  Decreased utilization of VIS/vest/somato, gait abnormality/instability, decreased postural stability, decreased gaze stabilization and decreased limits of stability    Other impairments:  Visual motion sensitivity, BPPV exam not complete this date.    Assessment details:    Lisa is a 44 y.o female who presents to PT for support in managing her dizziness and imbalance. This is a chronic problem that has fluctuated since 2015, but has been persistent and progressive the last 3 years. She presents today in a state of high symptom irritability and severity. As a result, PT evaluation was limited, but did reflect symptoms strongly triggered by saccades (all planes), head movement (all planes), and complex vision demands. She declined testing for BPPV, sensory organization of balance and VOR today, but anticipate will be able to integrated into future visits. Based on her subjective report and objective findings this may be mixed between peripheral and central sources. Peripheral given her complex history of sinusitis and central given her history of migraine and anxiety. Would consider differentials of vestibular migraine, PPPD, anxiety related dizziness, among others. Skilled PT services are indicated for progressive VRT to address the mentioned " "impairments, restore tolerance for complex movement/environments and enhance QOL.       Prognosis: good    Goals:     Short term goals:    - Pt is able to tolerate screening for BPPV  - Pt completes MARILEE exam bilaterally  - Pt completes balance assessment for baseline  - Pt is indep with utilization of grounding strategies for symptom suppression    Short term goal time span:  1-2 weeks    Long term goals:    - Improve DHI to 20% or better  - Pt is able to perform the KD test of saccades in 20\" or better with sx no more than 2/10  - Pt is able to VORx1 at 120 bpm for 30\" with sx 2/10 or better  - Pt demonstrates independence with a HEP for continued management of this problem beyond discharge from therapy.       Long term goal time span:  6-8 weeks  Plan:     Therapy options:  Physical therapy treatment to continue    Planned therapy interventions:  Functional Training, Self Care (CPT 06585), Therapeutic Activities (CPT 85484), Therapeutic Exercise (CPT 08642), Canalith Repositioning (CPT 60748) and Neuromuscular Re-education (CPT 16248)    Frequency: 1-2x/week.    Duration in weeks:  8    Discussed with:  Patient      Functional Assessment Used    DHI= 68%      Referring provider co-signature:  I have reviewed this plan of care and my co-signature certifies the need for services.    Certification Period: 04/16/2025 to  06/11/25    Physician Signature: ________________________________ Date: ______________          "

## 2025-04-18 ENCOUNTER — HOSPITAL ENCOUNTER (OUTPATIENT)
Dept: RADIOLOGY | Facility: MEDICAL CENTER | Age: 45
End: 2025-04-18
Attending: PSYCHIATRY & NEUROLOGY
Payer: COMMERCIAL

## 2025-04-18 ENCOUNTER — APPOINTMENT (OUTPATIENT)
Dept: PHYSICAL THERAPY | Facility: MEDICAL CENTER | Age: 45
End: 2025-04-18
Attending: OTOLARYNGOLOGY
Payer: COMMERCIAL

## 2025-04-24 NOTE — OP THERAPY DAILY TREATMENT
"  Outpatient Physical Therapy  DAILY TREATMENT     [facility] Outpatient Physical Therapy  [address]  [place] [address]  Phone:  [phone]  Fax:  [phone]    Date: 04/25/2025    Patient: Lisa Arevalo  YOB: 1980  MRN: [id number]     Time Calculation    Start time: 1105  Stop time: 1145 Time Calculation (min): 40 minutes         Chief Complaint: Vertigo    Visit #: 2    SUBJECTIVE:  Has seen neurology since her last visit here. Felt positive about this visit overall. \"It felt good being heard and feeling like we are moving some kind of direction.\" Reports having a bad week since Tuesday: waves of dizziness, headache, spotty/wavy vision, anxiety, tickley sensation down the back of her neck and spine. Has been doing the HEP and walking, which she does notice to be helping.     OBJECTIVE:      Therapeutic Treatments and Modalities:     1. Neuromuscular Re-education (CPT 06529)    Therapeutic Treatment and Modalities Summary:   Chambers Sensory Organization Performance (SOP) test:  1) Standard Eyes Open= N  2) Standard Eyes Closed= N (produces dizziness, pressure in the ears, no nausea)   3) Modified Tandem Eyes Open= N  4) Modified Tandem Eyes Closed= N (more intense with regard to sx)  5) Foam Eyes Open= N  6) Foam Eyes Closed= N (Felt better here)  7) Fukuda Step= N (icky at first, but better with repetition)    Key: N=Normal, S= Sway, F= Fall, R= Right, L= Left    Ankle sways: EC x 1 min (reduced sx compared to holding still)     SOR x 5 min     Education: discussed central sensitization and reviewed impacts of psychosocial factors. Provided with \"explain pain\" work book and to consider pain synonymous with dizziness.     Access Code: [email or web address]  URL: [email or web address]  Date: 04/16/2025  Prepared by:     Exercises  - Supine Bridge  - 5 x weekly - 20 reps - 5 sec hold  - Hooklying Sequential Leg March and Lower  - 5 x weekly - 20 reps - " 5 sec hold  - Supine Deep Neck Flexor Training  - 5 x weekly - 2-4 sets - 10-30 second hold    Time-based treatments/modalities:    Physical Therapy Timed Treatment Charges  Neuromusc re-ed, balance, coor, post minutes (CPT 22098): 40 minutes    ASSESSMENT:   Response to treatment: Able to tolerance balance testing, which overall shows good sensory organization. Sx less intense on foam and during dynamic tasks, which is consistent with MdDS component, but continues to have features of central sensitization/migraine dysfunction. Receptive to education and introductory tasks overall successful. Appropriate to continue VRT working from dynamic toward static tasks.     PLAN/RECOMMENDATIONS:   Plan for treatment: therapy treatment to continue next visit.  Planned interventions for next visit: continue with current treatment.

## 2025-04-24 NOTE — Clinical Note
Member Name: Lisa Arevalo   Member Number: [id number]   Reference Number: [id number]   Approved Services: Consultation   Approved Service Dates: 04/24/2025 - 04/24/2026   Requesting Provider: Vikki Delgadillo   Requested Provider: [facility]     Dear Lisa Arevalo:     The following medical service(s) requested by Vikki Delgadillo have been approved:    Procedure Code Procedure Code Name Requested Quantity Approved Quantity Status   54909 (CPT®) IN OFFICE/OUTPATIENT NEW MODERATE MDM 45 MINUTES 1 1 Authorized   80688 (CPT®) IN OFFICE/OUTPATIENT ESTABLISHED MOD MDM 30 MIN 5 5 Authorized       Approved Quantity means the number of visits approved for medication treatments and/or medical services.    The services should be provided by [facility] no later than 04/24/2026. Please contact the provider listed below with any questions.     Provider Information:  [facility]  [phone]    Your plan benefit may require a deductible, co-payment or coinsurance for these services. This authorization does not guarantee [facility] will pay the claim for services that you receive. Payment by [employer] for these services is subject to the terms of your Evidence of Coverage or Summary Plan Description, your eligibility at the time of service, and confirmation of benefit coverage.    For any questions or additional information, please contact Customer Service:    [employer]  Customer Service: [phone] or toll free [phone]  TTY users dial: [phone]   Call Center Hours: Mon - Fri 7 AM to 8 PM PST   Office Hours: Mon - Fri 8 AM to 5 PM [facility]   E-mail: [email or web address]   Website: [email or web address]       This information is available for free in other languages. Please contact Customer Service at the phone number above for more information. [employer] complies with applicable Federal  civil rights laws and does not discriminate on the basis of race, color, national origin, age, disability or sex.      Sincerely,     Healthcare Utilization Management Department     Cc: [facility]   Vikki Delgadillo

## 2025-04-25 ENCOUNTER — PHYSICAL THERAPY (OUTPATIENT)
Dept: PHYSICAL THERAPY | Facility: MEDICAL CENTER | Age: 45
End: 2025-04-25
Attending: OTOLARYNGOLOGY
Payer: COMMERCIAL

## 2025-04-25 DIAGNOSIS — R42 VERTIGO: ICD-10-CM

## 2025-04-25 PROCEDURE — 97112 NEUROMUSCULAR REEDUCATION: CPT

## 2025-04-29 ENCOUNTER — TELEPHONE (OUTPATIENT)
Dept: HEALTH INFORMATION MANAGEMENT | Facility: OTHER | Age: 45
End: 2025-04-29
Payer: COMMERCIAL

## 2025-05-01 ENCOUNTER — PATIENT MESSAGE (OUTPATIENT)
Dept: SCHEDULING | Facility: IMAGING CENTER | Age: 45
End: 2025-05-01
Payer: COMMERCIAL

## 2025-05-02 ENCOUNTER — APPOINTMENT (OUTPATIENT)
Dept: PHYSICAL THERAPY | Facility: MEDICAL CENTER | Age: 45
End: 2025-05-02
Attending: OTOLARYNGOLOGY
Payer: COMMERCIAL

## 2025-05-06 ENCOUNTER — OFFICE VISIT (OUTPATIENT)
Dept: URGENT CARE | Facility: CLINIC | Age: 45
End: 2025-05-06
Payer: COMMERCIAL

## 2025-05-06 VITALS
BODY MASS INDEX: 20.46 KG/M2 | TEMPERATURE: 97.6 F | DIASTOLIC BLOOD PRESSURE: 78 MMHG | SYSTOLIC BLOOD PRESSURE: 114 MMHG | RESPIRATION RATE: 14 BRPM | HEART RATE: 67 BPM | WEIGHT: 135 LBS | OXYGEN SATURATION: 99 % | HEIGHT: 68 IN

## 2025-05-06 DIAGNOSIS — H69.93 EUSTACHIAN TUBE DYSFUNCTION, BILATERAL: ICD-10-CM

## 2025-05-06 DIAGNOSIS — H61.23 BILATERAL IMPACTED CERUMEN: ICD-10-CM

## 2025-05-06 PROCEDURE — 99213 OFFICE O/P EST LOW 20 MIN: CPT

## 2025-05-06 ASSESSMENT — ENCOUNTER SYMPTOMS
DIARRHEA: 0
NAUSEA: 0
DIZZINESS: 0
FEVER: 0
SHORTNESS OF BREATH: 0
WEAKNESS: 0
COUGH: 0
SORE THROAT: 0
VOMITING: 0

## 2025-05-06 ASSESSMENT — FIBROSIS 4 INDEX: FIB4 SCORE: 0.65

## 2025-05-07 NOTE — PROGRESS NOTES
"Subjective     Lisa Arevalo is a 44 y.o. female who presents with Ear Fullness (Bilateral ear fullness , patient had a cold a couple weeks ago )            Ear Fullness  This is a new problem. The current episode started 1 to 4 weeks ago. The problem has been gradually worsening. Pertinent negatives include no chest pain, congestion, coughing, fever, nausea, rash, sore throat, vomiting or weakness.       Review of Systems   Constitutional:  Negative for fever and malaise/fatigue.   HENT:  Positive for ear pain. Negative for congestion, ear discharge and sore throat.    Respiratory:  Negative for cough and shortness of breath.    Cardiovascular:  Negative for chest pain.   Gastrointestinal:  Negative for diarrhea, nausea and vomiting.   Skin:  Negative for rash.   Neurological:  Negative for dizziness and weakness.   All other systems reviewed and are negative.             Objective     /78 (BP Location: Left arm, Patient Position: Sitting, BP Cuff Size: Adult)   Pulse 67   Temp 36.4 °C (97.6 °F) (Temporal)   Resp 14   Ht 1.727 m (5' 8\")   Wt 61.2 kg (135 lb)   SpO2 99%   BMI 20.53 kg/m²      Physical Exam  Vitals reviewed.   Constitutional:       Appearance: Normal appearance.   HENT:      Head: Normocephalic.      Right Ear: A middle ear effusion is present. There is impacted cerumen. Tympanic membrane is not erythematous or bulging.      Left Ear: A middle ear effusion is present. There is impacted cerumen. Tympanic membrane is not erythematous or bulging.      Nose: Nose normal.      Mouth/Throat:      Mouth: Mucous membranes are moist.      Pharynx: Oropharynx is clear.   Eyes:      Extraocular Movements: Extraocular movements intact.      Conjunctiva/sclera: Conjunctivae normal.   Cardiovascular:      Rate and Rhythm: Normal rate.   Pulmonary:      Effort: Pulmonary effort is normal.   Musculoskeletal:         General: Normal range of motion.   Skin:     General: Skin is warm and dry. "   Neurological:      Mental Status: She is alert and oriented to person, place, and time.   Psychiatric:         Behavior: Behavior normal.                                  Assessment & Plan  Eustachian tube dysfunction, bilateral    Orders:    Referral to ENT  Can continue to use nasacourt, astelin, and an OTC antihistamine like claritin.    Previously saw ENT who is now retired.   Bilateral impacted cerumen       Bilateral cerumen removed with currette. Patient tolerate well and stated improvement in muffled hearing     Shared decision-making was utilized with Lisa for plan of care. Discussed differential diagnoses, natural history, and supportive care. Reviewed indication for use and the potential benefits and side effects of medications. Lisa was encouraged to monitor symptoms closely and educated about signs and symptoms that would warrant concern and mandate seeking a higher level of service through the emergency department discussed at length. All questions answered to Lisa's satisfaction and they were in agreement with treatment plan at time of discharge.

## 2025-05-08 ENCOUNTER — PATIENT MESSAGE (OUTPATIENT)
Dept: CARDIOLOGY | Facility: MEDICAL CENTER | Age: 45
End: 2025-05-08
Payer: COMMERCIAL

## 2025-05-08 NOTE — PATIENT COMMUNICATION
CW: Pt is scheduled with you 6/11/25. Pt asking if she can continue with acupuncture give her recent episode of symptoms, please see attached message. Thank you

## 2025-05-08 NOTE — OP THERAPY DAILY TREATMENT
Outpatient Physical Therapy  DAILY TREATMENT     [facility] Outpatient Physical Therapy  [address]  [place] [address]  Phone:  [phone]  Fax:  [phone]    Date: 05/09/2025    Patient: Lisa Arevalo  YOB: 1980  MRN: [id number]     Time Calculation    Start time: 1018  Stop time: 1100 Time Calculation (min): 42 minutes         Chief Complaint: Vertigo    Visit #: 3    SUBJECTIVE:  I was sick last week. I often find I actually feel better with regard to the visual motion sensitivity when I am sick. I was able to do more on my phone than typical, but later in the week several worse days. Did acupuncture last week. No prior issues, but this time had HTN, elevated HR and anxiety. Working with cardiology for possible dysautonomia. Reports increasingly sound sensitive, to the point that even her spouse swallowing or changing position can be a trigger.    OBJECTIVE:      Therapeutic Treatments and Modalities:     1. Neuromuscular Re-education (CPT 60950)    Therapeutic Treatment and Modalities Summary:   Discussed her various symptoms at length    Reviewed concepts of central sensitization and strategies to calm the autonomic response: indep with diaphragm breathing, use of weighted blanket, daily walks and pilates based training    Discussed her questions regarding specialists consultations including considerations for conditions that impact dysautonomia (MCAS specifically). Provided informational HOs to support self education/research efforts  Access Code: [email or web address]  URL: [email or web address]  Date: 04/16/2025  Prepared by:     Exercises  - Supine Bridge  - 5 x weekly - 20 reps - 5 sec hold  - Hooklying Sequential Leg March and Lower  - 5 x weekly - 20 reps - 5 sec hold  - Supine Deep Neck Flexor Training  - 5 x weekly - 2-4 sets - 10-30 second hold    Time-based treatments/modalities:    Physical Therapy Timed Treatment  Charges  Neuromusc re-ed, balance, coor, post minutes (CPT 86783): 42 minutes    ASSESSMENT:   Response to treatment: Lisa was seen for 3 PT sessions supporting the management of her dizziness through VRT strategies. Her tolerance for VRT has been poor and we have not been able to move beyond suppression techniques. Lisa demonstrates good independent utilization of floor based strengthening, daily walks, deep breathing and grounding via a weighted blanket. Despite successful integration of these strategies, she continues to have frequent flares in symptoms which leave her completely debilitated at time. Due to the high symptom irritability, further VRT is not seen to be appropriate at this time. She will continue to work with her PCP, cardiology and neurology to further manage the condition medically. Holding x 30 days to allow resumption if determined to be appropriate.     PLAN/RECOMMENDATIONS:   Plan for treatment: hold

## 2025-05-09 ENCOUNTER — PHYSICAL THERAPY (OUTPATIENT)
Dept: PHYSICAL THERAPY | Facility: MEDICAL CENTER | Age: 45
End: 2025-05-09
Attending: OTOLARYNGOLOGY
Payer: COMMERCIAL

## 2025-05-09 DIAGNOSIS — R42 VERTIGO: ICD-10-CM

## 2025-05-09 PROCEDURE — 97112 NEUROMUSCULAR REEDUCATION: CPT

## 2025-05-13 NOTE — Clinical Note
Member Name: Lisa Arevalo   Member Number: [id number]   Reference Number: [id number]   Approved Services: Consultation   Approved Service Dates: 05/06/2025 - 05/06/2026   Requesting Provider: Azra Stearns   Requested Provider: Ale Baez     Dear Lisa Arevalo:     The following medical service(s) requested by Azra Stearns have been approved:    Procedure Code Procedure Code Name Requested Quantity Approved Quantity Status   11799 (CPT®) [place] OFFICE/OUTPATIENT NEW MODERATE MDM 45 MINUTES 1 1 Authorized   79581 (CPT®) [place] OFFICE/OUTPATIENT ESTABLISHED MOD MDM 30 MIN 5 5 Authorized       Approved Quantity means the number of visits approved for medication treatments and/or medical services.    The services should be provided by Ale Baez no later than 05/06/2026. Please contact the provider listed below with any questions.     Provider Information:  Ale Baez  [phone]    Your plan benefit may require a deductible, co-payment or coinsurance for these services. This authorization does not guarantee [employer] will pay the claim for services that you receive. Payment by [employer] for these services is subject to the terms of your Evidence of Coverage or Summary Plan Description, your eligibility at the time of service, and confirmation of benefit coverage.    For any questions or additional information, please contact Customer Service:    [employer]  Customer Service: [phone] or toll free [phone]  TTY users dial: [phone]   Call Center Hours: Mon - Fri 7 AM to 8 PM PST   Office Hours: Mon - Fri 8 AM to 5 PM [facility]   E-mail: [email or web address]   Website: [email or web address]       This information is available for free in other languages. Please contact Customer Service at the phone number above for more information. [employer] complies with applicable Federal civil rights laws and does not discriminate on the basis of race, color, national  origin, age, disability or sex.      Sincerely,     Healthcare Utilization Management Department     Cc: Ale Stearns

## 2025-05-14 ENCOUNTER — APPOINTMENT (OUTPATIENT)
Dept: PHYSICAL THERAPY | Facility: MEDICAL CENTER | Age: 45
End: 2025-05-14
Attending: OTOLARYNGOLOGY
Payer: COMMERCIAL

## 2025-05-23 ENCOUNTER — APPOINTMENT (OUTPATIENT)
Dept: PHYSICAL THERAPY | Facility: MEDICAL CENTER | Age: 45
End: 2025-05-23
Attending: OTOLARYNGOLOGY
Payer: COMMERCIAL

## 2025-05-24 ENCOUNTER — HOSPITAL ENCOUNTER (OUTPATIENT)
Dept: LAB | Facility: MEDICAL CENTER | Age: 45
End: 2025-05-24
Attending: FAMILY MEDICINE
Payer: COMMERCIAL

## 2025-05-24 ENCOUNTER — HOSPITAL ENCOUNTER (OUTPATIENT)
Dept: LAB | Facility: MEDICAL CENTER | Age: 45
End: 2025-05-24
Attending: NURSE PRACTITIONER
Payer: COMMERCIAL

## 2025-05-24 LAB
25(OH)D3 SERPL-MCNC: 52 NG/ML (ref 30–100)
ALBUMIN SERPL BCP-MCNC: 4.7 G/DL (ref 3.2–4.9)
ALBUMIN/GLOB SERPL: 1.6 G/DL
ALP SERPL-CCNC: 62 U/L (ref 30–99)
ALT SERPL-CCNC: 14 U/L (ref 2–50)
ANION GAP SERPL CALC-SCNC: 19 MMOL/L (ref 7–16)
AST SERPL-CCNC: 18 U/L (ref 12–45)
BASOPHILS # BLD AUTO: 1.7 % (ref 0–1.8)
BASOPHILS # BLD: 0.07 K/UL (ref 0–0.12)
BILIRUB SERPL-MCNC: 3.7 MG/DL (ref 0.1–1.5)
BUN SERPL-MCNC: 5 MG/DL (ref 8–22)
CALCIUM ALBUM COR SERPL-MCNC: 9 MG/DL (ref 8.5–10.5)
CALCIUM SERPL-MCNC: 9.6 MG/DL (ref 8.5–10.5)
CHLORIDE SERPL-SCNC: 105 MMOL/L (ref 96–112)
CO2 SERPL-SCNC: 17 MMOL/L (ref 20–33)
CREAT SERPL-MCNC: 1.02 MG/DL (ref 0.5–1.4)
CRP SERPL HS-MCNC: <0.3 MG/DL (ref 0–0.75)
EOSINOPHIL # BLD AUTO: 0.15 K/UL (ref 0–0.51)
EOSINOPHIL NFR BLD: 3.7 % (ref 0–6.9)
ERYTHROCYTE [DISTWIDTH] IN BLOOD BY AUTOMATED COUNT: 44.2 FL (ref 35.9–50)
ERYTHROCYTE [SEDIMENTATION RATE] IN BLOOD BY WESTERGREN METHOD: 3 MM/HOUR (ref 0–25)
FOLATE SERPL-MCNC: 9.6 NG/ML
GFR SERPLBLD CREATININE-BSD FMLA CKD-EPI: 70 ML/MIN/1.73 M 2
GLOBULIN SER CALC-MCNC: 2.9 G/DL (ref 1.9–3.5)
GLUCOSE SERPL-MCNC: 83 MG/DL (ref 65–99)
HCT VFR BLD AUTO: 47.7 % (ref 37–47)
HGB BLD-MCNC: 16.1 G/DL (ref 12–16)
IMM GRANULOCYTES # BLD AUTO: 0.01 K/UL (ref 0–0.11)
IMM GRANULOCYTES NFR BLD AUTO: 0.2 % (ref 0–0.9)
LYMPHOCYTES # BLD AUTO: 1.35 K/UL (ref 1–4.8)
LYMPHOCYTES NFR BLD: 32.9 % (ref 22–41)
MCH RBC QN AUTO: 32.4 PG (ref 27–33)
MCHC RBC AUTO-ENTMCNC: 33.8 G/DL (ref 32.2–35.5)
MCV RBC AUTO: 96 FL (ref 81.4–97.8)
MONOCYTES # BLD AUTO: 0.28 K/UL (ref 0–0.85)
MONOCYTES NFR BLD AUTO: 6.8 % (ref 0–13.4)
NEUTROPHILS # BLD AUTO: 2.24 K/UL (ref 1.82–7.42)
NEUTROPHILS NFR BLD: 54.7 % (ref 44–72)
NRBC # BLD AUTO: 0 K/UL
NRBC BLD-RTO: 0 /100 WBC (ref 0–0.2)
PLATELET # BLD AUTO: 204 K/UL (ref 164–446)
PMV BLD AUTO: 11.1 FL (ref 9–12.9)
POTASSIUM SERPL-SCNC: 4.2 MMOL/L (ref 3.6–5.5)
PROT SERPL-MCNC: 7.6 G/DL (ref 6–8.2)
RBC # BLD AUTO: 4.97 M/UL (ref 4.2–5.4)
RHEUMATOID FACT SER IA-ACNC: <10 IU/ML (ref 0–14)
SODIUM SERPL-SCNC: 141 MMOL/L (ref 135–145)
T PALLIDUM AB SER QL IA: NORMAL
TSH SERPL DL<=0.005 MIU/L-ACNC: 4.6 UIU/ML (ref 0.38–5.33)
VIT B12 SERPL-MCNC: 632 PG/ML (ref 211–911)
WBC # BLD AUTO: 4.1 K/UL (ref 4.8–10.8)

## 2025-05-24 PROCEDURE — 86617 LYME DISEASE ANTIBODY: CPT | Mod: 91

## 2025-05-24 PROCEDURE — 86334 IMMUNOFIX E-PHORESIS SERUM: CPT

## 2025-05-24 PROCEDURE — 36415 COLL VENOUS BLD VENIPUNCTURE: CPT

## 2025-05-24 PROCEDURE — 84443 ASSAY THYROID STIM HORMONE: CPT

## 2025-05-24 PROCEDURE — 85652 RBC SED RATE AUTOMATED: CPT

## 2025-05-24 PROCEDURE — 82525 ASSAY OF COPPER: CPT

## 2025-05-24 PROCEDURE — 86140 C-REACTIVE PROTEIN: CPT

## 2025-05-24 PROCEDURE — 86780 TREPONEMA PALLIDUM: CPT

## 2025-05-24 PROCEDURE — 82784 ASSAY IGA/IGD/IGG/IGM EACH: CPT

## 2025-05-24 PROCEDURE — 82175 ASSAY OF ARSENIC: CPT

## 2025-05-24 PROCEDURE — 84446 ASSAY OF VITAMIN E: CPT

## 2025-05-24 PROCEDURE — 84155 ASSAY OF PROTEIN SERUM: CPT

## 2025-05-24 PROCEDURE — 86038 ANTINUCLEAR ANTIBODIES: CPT

## 2025-05-24 PROCEDURE — 83655 ASSAY OF LEAD: CPT

## 2025-05-24 PROCEDURE — 83825 ASSAY OF MERCURY: CPT

## 2025-05-24 PROCEDURE — 86225 DNA ANTIBODY NATIVE: CPT

## 2025-05-24 PROCEDURE — 83521 IG LIGHT CHAINS FREE EACH: CPT

## 2025-05-24 PROCEDURE — 82607 VITAMIN B-12: CPT

## 2025-05-24 PROCEDURE — 84425 ASSAY OF VITAMIN B-1: CPT

## 2025-05-24 PROCEDURE — 82746 ASSAY OF FOLIC ACID SERUM: CPT

## 2025-05-24 PROCEDURE — 84207 ASSAY OF VITAMIN B-6: CPT

## 2025-05-24 PROCEDURE — 84630 ASSAY OF ZINC: CPT

## 2025-05-24 PROCEDURE — 86431 RHEUMATOID FACTOR QUANT: CPT

## 2025-05-24 PROCEDURE — 86200 CCP ANTIBODY: CPT

## 2025-05-24 PROCEDURE — 85025 COMPLETE CBC W/AUTO DIFF WBC: CPT

## 2025-05-24 PROCEDURE — 82306 VITAMIN D 25 HYDROXY: CPT

## 2025-05-24 PROCEDURE — 84165 PROTEIN E-PHORESIS SERUM: CPT

## 2025-05-24 PROCEDURE — 86052 AQUAPORIN-4 ANTB CBA EACH: CPT

## 2025-05-24 PROCEDURE — 86235 NUCLEAR ANTIGEN ANTIBODY: CPT | Mod: 91

## 2025-05-24 PROCEDURE — 82300 ASSAY OF CADMIUM: CPT

## 2025-05-24 PROCEDURE — 80053 COMPREHEN METABOLIC PANEL: CPT

## 2025-05-26 LAB
CCP IGA+IGG SERPL IA-ACNC: 5 UNITS (ref 0–19)
COPPER SERPL-MCNC: 73.4 UG/DL (ref 80–155)
DSDNA AB TITR SER CLIF: NORMAL {TITER}
NUCLEAR IGG SER QL IA: NORMAL
ZINC SERPL-MCNC: 71.7 UG/DL (ref 60–120)

## 2025-05-27 LAB
AQP4 H2O CHANNEL IGG SERPL QL IF: NORMAL
ARSENIC BLD-MCNC: <10 UG/L
B BURGDOR IGG SER QL IB: NEGATIVE
B BURGDOR IGM SER QL IB: NEGATIVE
CADMIUM BLD-MCNC: <1 UG/L
LEAD BLDV-MCNC: <2 UG/DL
MERCURY BLD-MCNC: <2.5 UG/L

## 2025-05-28 ENCOUNTER — HOSPITAL ENCOUNTER (OUTPATIENT)
Facility: MEDICAL CENTER | Age: 45
End: 2025-05-28
Attending: INTERNAL MEDICINE
Payer: COMMERCIAL

## 2025-05-28 VITALS — DIASTOLIC BLOOD PRESSURE: 76 MMHG | HEART RATE: 85 BPM | SYSTOLIC BLOOD PRESSURE: 119 MMHG | OXYGEN SATURATION: 99 %

## 2025-05-28 DIAGNOSIS — R00.2 PALPITATIONS: ICD-10-CM

## 2025-05-28 DIAGNOSIS — E87.29 INCREASED ANION GAP METABOLIC ACIDOSIS: Primary | ICD-10-CM

## 2025-05-28 DIAGNOSIS — I95.1 ORTHOSTATIC INTOLERANCE: ICD-10-CM

## 2025-05-28 LAB
A-TOCOPHEROL VIT E SERPL-MCNC: 7.5 MG/L (ref 5.5–18)
ALBUMIN SERPL ELPH-MCNC: 4.7 G/DL (ref 3.75–5.01)
ALPHA1 GLOB SERPL ELPH-MCNC: 0.25 G/DL (ref 0.19–0.46)
ALPHA2 GLOB SERPL ELPH-MCNC: 0.57 G/DL (ref 0.48–1.05)
B-GLOBULIN SERPL ELPH-MCNC: 0.75 G/DL (ref 0.48–1.1)
BETA+GAMMA TOCOPHEROL SERPL-MCNC: 0.3 MG/L (ref 0–6)
EER MONOCLONAL PROTEIN AND FLC, SERUM Q5224: ABNORMAL
GAMMA GLOB SERPL ELPH-MCNC: 1.12 G/DL (ref 0.62–1.51)
IGA SERPL-MCNC: 282 MG/DL (ref 68–408)
IGG SERPL-MCNC: 896 MG/DL (ref 768–1632)
IGM SERPL-MCNC: 313 MG/DL (ref 35–263)
INTERPRETATION SERPL IFE-IMP: ABNORMAL
INTERPRETATION SERPL IFE-IMP: ABNORMAL
KAPPA LC FREE SER-MCNC: 23.14 MG/L (ref 3.3–19.4)
KAPPA LC FREE/LAMBDA FREE SER NEPH: 1.21 {RATIO} (ref 0.26–1.65)
LAMBDA LC FREE SERPL-MCNC: 19.09 MG/L (ref 5.71–26.3)
MONOCLONAL PROTEIN NL11656: ABNORMAL G/DL
PROT SERPL-MCNC: 7.4 G/DL (ref 6.3–8.2)
VIT B1 BLD-MCNC: 119 NMOL/L (ref 70–180)
VIT B6 SERPL-MCNC: 61 NMOL/L (ref 20–125)

## 2025-05-28 RX ORDER — PROPRANOLOL HYDROCHLORIDE 10 MG/1
10 TABLET ORAL 3 TIMES DAILY
Qty: 90 TABLET | Refills: 11 | Status: SHIPPED | OUTPATIENT
Start: 2025-05-28

## 2025-05-28 RX ORDER — PROPRANOLOL HCL 20 MG
20 TABLET ORAL 3 TIMES DAILY
Qty: 90 TABLET | Refills: 11 | Status: SHIPPED | OUTPATIENT
Start: 2025-05-28 | End: 2025-05-28 | Stop reason: SDUPTHER

## 2025-05-28 NOTE — PROGRESS NOTES
[facility] Note      CC:   Chief Complaint   Patient presents with    Palpitations       HPI: 43 yo with SVT in setting of Grave's disease    Hadn't tried propranolol but was interested in taking for vestibular migraine    Recent labs show AG but she has only had some diarrhea on magnesium supplment    Current list of administered Medications:  Current Medications[1]    Past Medical History[2]    Past Surgical History[3]    Family History   Problem Relation Age of Onset    Hypertension Father     Hypertension Brother     Alcohol/Drug Brother     Cancer Maternal Grandmother         breast, colon     Patient family history was personally reviewed, no pertinent family history to current presentation    Social History[4]    ALLERGIES:  Allergies[5]    Review of systems:  ROS      Physical exam:  /76 (BP Location: Left arm)   Pulse 85   SpO2 99%     Physical Exam    Vital signs are reported by the patient    General appears well  Eyes no icterus  Extremities no edema  Skin no apparent rashes    This encounter was conducted via Teams  Video Virtual Visit.   The patient was at home  in the [facility] . I am physically in [place].  Verbal consent was obtained. Patient's identity was verified.        Data:  Laboratory studies personally reviewed by me:  Recent Results (from the past 24 weeks)   CBC WITH DIFFERENTIAL    Collection Time: 03/14/25  9:49 AM   Result Value Ref Range    WBC 5.3 4.8 - 10.8 K/uL    RBC 5.04 4.20 - 5.40 M/uL    Hemoglobin 16.1 (H) 12.0 - 16.0 g/dL    Hematocrit 48.4 (H) 37.0 - 47.0 %    MCV 96.0 81.4 - 97.8 fL    MCH 31.9 27.0 - 33.0 pg    MCHC 33.3 32.2 - 35.5 g/dL    RDW 41.9 35.9 - 50.0 fL    Platelet Count 275 164 - 446 K/uL    MPV 10.3 9.0 - 12.9 fL    Neutrophils-Polys 60.60 44.00 - 72.00 %    Lymphocytes 29.90 22.00 - 41.00 %    Monocytes 5.50 0.00 - 13.40 %    Eosinophils 2.10 0.00 - 6.90 %    Basophils 1.70 0.00 - 1.80 %    Immature Granulocytes 0.20 0.00 - 0.90 %     Nucleated RBC 0.00 0.00 - 0.20 /100 WBC    Neutrophils (Absolute) 3.20 1.82 - 7.42 K/uL    Lymphs (Absolute) 1.58 1.00 - 4.80 K/uL    Monos (Absolute) 0.29 0.00 - 0.85 K/uL    Eos (Absolute) 0.11 0.00 - 0.51 K/uL    Baso (Absolute) 0.09 0.00 - 0.12 K/uL    Immature Granulocytes (abs) 0.01 0.00 - 0.11 K/uL    NRBC (Absolute) 0.00 K/uL   Comp Metabolic Panel    Collection Time: 03/14/25  9:49 AM   Result Value Ref Range    Sodium 138 135 - 145 mmol/L    Potassium 4.5 3.6 - 5.5 mmol/L    Chloride 105 96 - 112 mmol/L    Co2 23 20 - 33 mmol/L    Anion Gap 10.0 7.0 - 16.0    Glucose 79 65 - 99 mg/dL    Bun 7 (L) 8 - 22 mg/dL    Creatinine 0.96 0.50 - 1.40 mg/dL    Calcium 9.3 8.5 - 10.5 mg/dL    Correct Calcium 9.1 8.5 - 10.5 mg/dL    AST(SGOT) 19 12 - 45 U/L    ALT(SGPT) 20 2 - 50 U/L    Alkaline Phosphatase 70 30 - 99 U/L    Total Bilirubin 1.9 (H) 0.1 - 1.5 mg/dL    Albumin 4.3 3.2 - 4.9 g/dL    Total Protein 7.1 6.0 - 8.2 g/dL    Globulin 2.8 1.9 - 3.5 g/dL    A-G Ratio 1.5 g/dL   T3 FREE    Collection Time: 03/14/25  9:49 AM   Result Value Ref Range    T3,Free 2.49 2.00 - 4.40 pg/mL   FERRITIN    Collection Time: 03/14/25  9:49 AM   Result Value Ref Range    Ferritin 41.8 10.0 - 291.0 ng/mL   TSH    Collection Time: 03/14/25  9:49 AM   Result Value Ref Range    TSH 6.900 (H) 0.350 - 5.500 uIU/mL   IRON/TOTAL IRON BIND    Collection Time: 03/14/25  9:49 AM   Result Value Ref Range    Iron 135 40 - 170 ug/dL    Total Iron Binding 274 250 - 450 ug/dL    Unsat Iron Binding 139 110 - 370 ug/dL    % Saturation 49 15 - 55 %   FT4 DIRECT    Collection Time: 03/14/25  9:49 AM   Result Value Ref Range    Free T4 by Equil Dialysis - TMS 1.9 1.1 - 2.4 ng/dL   ESTIMATED GFR    Collection Time: 03/14/25  9:49 AM   Result Value Ref Range    GFR (CKD-EPI) 76 >60 mL/min/1.73 m 2   TSH WITH REFLEX TO FT4    Collection Time: 05/24/25 10:46 AM   Result Value Ref Range    TSH 4.600 0.380 - 5.330 uIU/mL   Sed Rate    Collection Time:  05/24/25 10:50 AM   Result Value Ref Range    Sed Rate Westergren 3 0 - 25 mm/hour   RHEUMATOID ARTHRITIS FACTOR    Collection Time: 05/24/25 10:50 AM   Result Value Ref Range    Rheumatoid Factor -Neph- <10 0 - 14 IU/mL   T.PALLIDUM AB ZOHAIB (SCREENING)    Collection Time: 05/24/25 10:50 AM   Result Value Ref Range    Syphilis, Treponemal Qual Non-Reactive Non-Reactive   FOLATE SERUM/PLASMA    Collection Time: 05/24/25 10:50 AM   Result Value Ref Range    Folate -Folic Acid 9.6 >4.0 ng/mL   JONO REFLEXIVE PROFILE    Collection Time: 05/24/25 10:50 AM   Result Value Ref Range    Antinuclear Antibody None Detected None Detected   VITAMIN B12    Collection Time: 05/24/25 10:50 AM   Result Value Ref Range    Vitamin B12 -True Cobalamin 632 211 - 911 pg/mL   VITAMIN D,25 HYDROXY (DEFICIENCY)    Collection Time: 05/24/25 10:50 AM   Result Value Ref Range    25-Hydroxy   Vitamin D 25 52 30 - 100 ng/mL   CBC WITH DIFFERENTIAL    Collection Time: 05/24/25 10:50 AM   Result Value Ref Range    WBC 4.1 (L) 4.8 - 10.8 K/uL    RBC 4.97 4.20 - 5.40 M/uL    Hemoglobin 16.1 (H) 12.0 - 16.0 g/dL    Hematocrit 47.7 (H) 37.0 - 47.0 %    MCV 96.0 81.4 - 97.8 fL    MCH 32.4 27.0 - 33.0 pg    MCHC 33.8 32.2 - 35.5 g/dL    RDW 44.2 35.9 - 50.0 fL    Platelet Count 204 164 - 446 K/uL    MPV 11.1 9.0 - 12.9 fL    Neutrophils-Polys 54.70 44.00 - 72.00 %    Lymphocytes 32.90 22.00 - 41.00 %    Monocytes 6.80 0.00 - 13.40 %    Eosinophils 3.70 0.00 - 6.90 %    Basophils 1.70 0.00 - 1.80 %    Immature Granulocytes 0.20 0.00 - 0.90 %    Nucleated RBC 0.00 0.00 - 0.20 /100 WBC    Neutrophils (Absolute) 2.24 1.82 - 7.42 K/uL    Lymphs (Absolute) 1.35 1.00 - 4.80 K/uL    Monos (Absolute) 0.28 0.00 - 0.85 K/uL    Eos (Absolute) 0.15 0.00 - 0.51 K/uL    Baso (Absolute) 0.07 0.00 - 0.12 K/uL    Immature Granulocytes (abs) 0.01 0.00 - 0.11 K/uL    NRBC (Absolute) 0.00 K/uL   Comp Metabolic Panel    Collection Time: 05/24/25 10:50 AM   Result Value Ref  Range    Sodium 141 135 - 145 mmol/L    Potassium 4.2 3.6 - 5.5 mmol/L    Chloride 105 96 - 112 mmol/L    Co2 17 (L) 20 - 33 mmol/L    Anion Gap 19.0 (H) 7.0 - 16.0    Glucose 83 65 - 99 mg/dL    Bun 5 (L) 8 - 22 mg/dL    Creatinine 1.02 0.50 - 1.40 mg/dL    Calcium 9.6 8.5 - 10.5 mg/dL    Correct Calcium 9.0 8.5 - 10.5 mg/dL    AST(SGOT) 18 12 - 45 U/L    ALT(SGPT) 14 2 - 50 U/L    Alkaline Phosphatase 62 30 - 99 U/L    Total Bilirubin 3.7 (H) 0.1 - 1.5 mg/dL    Albumin 4.7 3.2 - 4.9 g/dL    Total Protein 7.6 6.0 - 8.2 g/dL    Globulin 2.9 1.9 - 3.5 g/dL    A-G Ratio 1.6 g/dL   CRP QUANTITIVE (NON-CARDIAC)    Collection Time: 05/24/25 10:50 AM   Result Value Ref Range    Stat C-Reactive Protein <0.30 0.00 - 0.75 mg/dL   Monoclonal Protein and FLC, Serum    Collection Time: 05/24/25 10:50 AM   Result Value Ref Range    Albumin 4.70 3.75 - 5.01 g/dL    Alpha-1 Globulin 0.25 0.19 - 0.46 g/dL    Alpha-2 Globulin 0.57 0.48 - 1.05 g/dL    Beta Globulin 0.75 0.48 - 1.10 g/dL    Gamma Globulin 1.12 0.62 - 1.51 g/dL    Immunofixation MARIE Done     Immunoglobulin G 896 768 - 1632 mg/dL    Immunoglobulin A 282 68 - 408 mg/dL    Immunoglobulin M 313 (H) 35 - 263 mg/dL    Monoclonal Protein Not Applicable <=0.00 g/dL    Total Protein, Serum 7.4 6.3 - 8.2 g/dL    Free Kappa Light Chains 23.14 (H) 3.30 - 19.40 mg/L    Free Lambda Light Chains 19.09 5.71 - 26.30 mg/L    Kappa-Lambda Ratio 1.21 0.26 - 1.65    Interpretation See Note     EER Monoclonal Protein and FLC, Serum See Note    HEAVY METALS BLOOD    Collection Time: 05/24/25 10:50 AM   Result Value Ref Range    Arsenic, Blood <10.0 <=12.0 ug/L    Mercury, Blood <2.5 <=10.0 ug/L    Lead Blood <2.0 <=4.9 ug/dL    Cadmium Blood <1.0 <=5.0 ug/L   DSDNA AB, IGG W/RFLX TO IFA TITER    Collection Time: 05/24/25 10:50 AM   Result Value Ref Range    Anti-Dna -Ds SEE BELOW    VITAMIN B1    Collection Time: 05/24/25 10:50 AM   Result Value Ref Range    Vitamin B1 119 70 - 180 nmol/L    VITAMIN B6    Collection Time: 05/24/25 10:50 AM   Result Value Ref Range    Vitamin B6 61.0 20.0 - 125.0 nmol/L   CCP ANTIBODIES, IGG/IGA    Collection Time: 05/24/25 10:50 AM   Result Value Ref Range    Cyclic Citrullinated Peptide Ab, IgG/A 5 0 - 19 Units   ESTIMATED GFR    Collection Time: 05/24/25 10:50 AM   Result Value Ref Range    GFR (CKD-EPI) 70 >60 mL/min/1.73 m 2   ZINC SERUM    Collection Time: 05/24/25 10:52 AM   Result Value Ref Range    Zinc Serum 71.7 60.0 - 120.0 ug/dL   LYME WESTERN BLOT IGG + IGM    Collection Time: 05/24/25 10:52 AM   Result Value Ref Range    B. burgdorferi IgG IB Negative Negative    B. burgdorferi IgM IB Negative Negative   COPPER, SERUM    Collection Time: 05/24/25 10:52 AM   Result Value Ref Range    Copper Serum 73.4 (L) 80.0 - 155.0 ug/dL   Aquaporin-4 Receptor IgG, Serum, rflx Titer    Collection Time: 05/24/25 10:52 AM   Result Value Ref Range    Aquaporin-4 Receptor IgG,Serum <1:10 <1:10             All pertinent features of laboratory and imaging reviewed including primary images where applicable      1. Palpitations  propranolol (INDERAL) 10 MG Tab    DISCONTINUED: propranolol (INDERAL) 20 MG Tab          Assessment / Plan / MDM:    It was my pleasure to meet with Ms. Arevalo.    Blood pressure is well controlled.  She will continue to monitor and eat hearty healthy diet.    Can trial propanolol    Tilt test for orthostatic dizziness palpitations especially breathlessness    I will see Ms. Arevalo back in 1 year time and encouraged her to follow up with us over the phone or electronically using my MyChart as issues arise.    It is my pleasure to participate in the care of Ms. Arevalo.  Please do not hesitate to contact me with questions or concerns.    Chaim Sigala MD PhD FAC  Cardiologist [facility]    Please note that this dictation was created using voice recognition software. There may be errors I did not discover before  finalizing the note.              [1]   Current Outpatient Medications:     MAGNESIUM PO, Take  by mouth., Disp: , Rfl:     Pyridoxine HCl (VITAMIN B-6 PO), Take  by mouth., Disp: , Rfl:     IBUPROFEN & ACETAMINOPHEN PO, Take  by mouth. (Patient taking differently: Take  by mouth as needed.), Disp: , Rfl:     propranolol (INDERAL) 10 MG Tab, Take 1 Tablet by mouth 3 times a day., Disp: 90 Tablet, Rfl: 11    levothyroxine (SYNTHROID) 100 MCG Tab, TAKE ONE TABLET BY MOUTH EVERY MORNING ON AN EMPTY STOMACH , Disp: 90 tablet, Rfl: 1    Prenatal MV-Min-Fe Fum-FA-DHA (PRENATAL 1 PO), Take  by mouth., Disp: , Rfl:   [2]   Past Medical History:  Diagnosis Date    Arrhythmia     palpitations    Cold     Fibromyalgia     Heart burn     Hyperthyroidism 2010    status post thyroidectomy    Hypothyroidism, postsurgical 2010    Indigestion     Interstitial cystitis     Migraine     Renal disorder     intertstital cystitis   [3]   Past Surgical History:  Procedure Laterality Date    THYROIDECTOMY TOTAL  9/29/2010    Performed by JUSTIN CLAYTON at [facility]    TONSILLECTOMY AND ADENOIDECTOMY  1985    MASTOIDECTOMY      MYRINGOTOMY      OTHER      placement of Anne Cath times 2    OTHER ORTHOPEDIC SURGERY      broken left arm   [4]   Social History  Tobacco Use    Smoking status: Never    Smokeless tobacco: Never   Substance Use Topics    Alcohol use: Yes     Alcohol/week: 0.0 oz     Comment: rare    Drug use: No   [5]   Allergies  Allergen Reactions    Macrobid [Nitrofurantoin Monohydrate Macrocrystals]      Chest problems    Minocycline Hives    Neurontin [Gabapentin]      Slurry speechj    Rocephin [Ceftriaxone Sodium] Hives    Tapazole [Thiamazole] Hives    Gluten Meal Diarrhea and Vomiting     All gluten products    Propylthiouracil

## 2025-05-28 NOTE — PATIENT INSTRUCTIONS
Exercise Program  [employer] [facility] [place] [facility] Exercise Program that helps reduce symptoms of POTS    [email or web address].org/data/uploads/[email or web address]-[email or web address]%[email or web address]%20Exercise%20Program%20.pdf    High sodium diet - focus on foods that have 3 times the sodium as calories    Compression socks  You may benefit from compression socks to help reduce the need for medications over time     Local resources may be   [facility] [address] [place] [address] [phone]  [facility] [address] [place] [address] [phone]  Compression devices can also be found online with multiple vendors    Possible Medications to try:  Florinef - encourages salt retention from the kidneys  Midodrine - constricts blood vessels but can also raise heart rate  Corlanor (Ivabradine) - lowers heart rate  Northera (Parkinson's  medication for orthostatic hypotension)

## 2025-05-28 NOTE — LETTER
[facility]   [address],   [address]  [place] [address]  Phone: [phone]  Fax: [phone]              Lisa Arevalo  1980    Encounter Date: 5/28/2025    Chaim Sigala M.D.          PROGRESS NOTE:  [facility]      CC:   Chief Complaint   Patient presents with    Palpitations       HPI: 43 yo with SVT in setting of Grave's disease    Hadn't tried propranolol but was interested in taking for vestibular migraine    Recent labs show AG but she has only had some diarrhea on magnesium supplment    Current list of administered Medications:  Current Medications[1]    Past Medical History[2]    Past Surgical History[3]    Family History   Problem Relation Age of Onset    Hypertension Father     Hypertension Brother     Alcohol/Drug Brother     Cancer Maternal Grandmother         breast, colon     Patient family history was personally reviewed, no pertinent family history to current presentation    Social History[4]    ALLERGIES:  Allergies[5]    Review of systems:  ROS      Physical exam:  /76 (BP Location: Left arm)   Pulse 85   SpO2 99%     Physical Exam    Vital signs are reported by the patient    General appears well  Eyes no icterus  Extremities no edema  Skin no apparent rashes    This encounter was conducted via Teams  Video Virtual Visit.   The patient was at home  in the state of [place] . I am physically in [place].  Verbal consent was obtained. Patient's identity was verified.        Data:  Laboratory studies personally reviewed by me:  Recent Results (from the past 24 weeks)   CBC WITH DIFFERENTIAL    Collection Time: 03/14/25  9:49 AM   Result Value Ref Range    WBC 5.3 4.8 - 10.8 K/uL    RBC 5.04 4.20 - 5.40 M/uL    Hemoglobin 16.1 (H) 12.0 - 16.0 g/dL    Hematocrit 48.4 (H) 37.0 - 47.0 %    MCV 96.0 81.4 - 97.8 fL    MCH 31.9 27.0 - 33.0 pg    MCHC 33.3 32.2 - 35.5 g/dL    RDW 41.9 35.9 - 50.0 fL    Platelet Count 275  164 - 446 K/uL    MPV 10.3 9.0 - 12.9 fL    Neutrophils-Polys 60.60 44.00 - 72.00 %    Lymphocytes 29.90 22.00 - 41.00 %    Monocytes 5.50 0.00 - 13.40 %    Eosinophils 2.10 0.00 - 6.90 %    Basophils 1.70 0.00 - 1.80 %    Immature Granulocytes 0.20 0.00 - 0.90 %    Nucleated RBC 0.00 0.00 - 0.20 /100 WBC    Neutrophils (Absolute) 3.20 1.82 - 7.42 K/uL    Lymphs (Absolute) 1.58 1.00 - 4.80 K/uL    Monos (Absolute) 0.29 0.00 - 0.85 K/uL    Eos (Absolute) 0.11 0.00 - 0.51 K/uL    Baso (Absolute) 0.09 0.00 - 0.12 K/uL    Immature Granulocytes (abs) 0.01 0.00 - 0.11 K/uL    NRBC (Absolute) 0.00 K/uL   Comp Metabolic Panel    Collection Time: 03/14/25  9:49 AM   Result Value Ref Range    Sodium 138 135 - 145 mmol/L    Potassium 4.5 3.6 - 5.5 mmol/L    Chloride 105 96 - 112 mmol/L    Co2 23 20 - 33 mmol/L    Anion Gap 10.0 7.0 - 16.0    Glucose 79 65 - 99 mg/dL    Bun 7 (L) 8 - 22 mg/dL    Creatinine 0.96 0.50 - 1.40 mg/dL    Calcium 9.3 8.5 - 10.5 mg/dL    Correct Calcium 9.1 8.5 - 10.5 mg/dL    AST(SGOT) 19 12 - 45 U/L    ALT(SGPT) 20 2 - 50 U/L    Alkaline Phosphatase 70 30 - 99 U/L    Total Bilirubin 1.9 (H) 0.1 - 1.5 mg/dL    Albumin 4.3 3.2 - 4.9 g/dL    Total Protein 7.1 6.0 - 8.2 g/dL    Globulin 2.8 1.9 - 3.5 g/dL    A-G Ratio 1.5 g/dL   T3 FREE    Collection Time: 03/14/25  9:49 AM   Result Value Ref Range    T3,Free 2.49 2.00 - 4.40 pg/mL   FERRITIN    Collection Time: 03/14/25  9:49 AM   Result Value Ref Range    Ferritin 41.8 10.0 - 291.0 ng/mL   TSH    Collection Time: 03/14/25  9:49 AM   Result Value Ref Range    TSH 6.900 (H) 0.350 - 5.500 uIU/mL   IRON/TOTAL IRON BIND    Collection Time: 03/14/25  9:49 AM   Result Value Ref Range    Iron 135 40 - 170 ug/dL    Total Iron Binding 274 250 - 450 ug/dL    Unsat Iron Binding 139 110 - 370 ug/dL    % Saturation 49 15 - 55 %   FT4 DIRECT    Collection Time: 03/14/25  9:49 AM   Result Value Ref Range    Free T4 by Equil Dialysis - TMS 1.9 1.1 - 2.4 ng/dL    ESTIMATED GFR    Collection Time: 03/14/25  9:49 AM   Result Value Ref Range    GFR (CKD-EPI) 76 >60 mL/min/1.73 m 2   TSH WITH REFLEX TO FT4    Collection Time: 05/24/25 10:46 AM   Result Value Ref Range    TSH 4.600 0.380 - 5.330 uIU/mL   Sed Rate    Collection Time: 05/24/25 10:50 AM   Result Value Ref Range    Sed Rate Westergren 3 0 - 25 mm/hour   RHEUMATOID ARTHRITIS FACTOR    Collection Time: 05/24/25 10:50 AM   Result Value Ref Range    Rheumatoid Factor -Neph- <10 0 - 14 IU/mL   T.PALLIDUM AB ZOHAIB (SCREENING)    Collection Time: 05/24/25 10:50 AM   Result Value Ref Range    Syphilis, Treponemal Qual Non-Reactive Non-Reactive   FOLATE SERUM/PLASMA    Collection Time: 05/24/25 10:50 AM   Result Value Ref Range    Folate -Folic Acid 9.6 >4.0 ng/mL   JONO REFLEXIVE PROFILE    Collection Time: 05/24/25 10:50 AM   Result Value Ref Range    Antinuclear Antibody None Detected None Detected   VITAMIN B12    Collection Time: 05/24/25 10:50 AM   Result Value Ref Range    Vitamin B12 -True Cobalamin 632 211 - 911 pg/mL   VITAMIN D,25 HYDROXY (DEFICIENCY)    Collection Time: 05/24/25 10:50 AM   Result Value Ref Range    25-Hydroxy   Vitamin D 25 52 30 - 100 ng/mL   CBC WITH DIFFERENTIAL    Collection Time: 05/24/25 10:50 AM   Result Value Ref Range    WBC 4.1 (L) 4.8 - 10.8 K/uL    RBC 4.97 4.20 - 5.40 M/uL    Hemoglobin 16.1 (H) 12.0 - 16.0 g/dL    Hematocrit 47.7 (H) 37.0 - 47.0 %    MCV 96.0 81.4 - 97.8 fL    MCH 32.4 27.0 - 33.0 pg    MCHC 33.8 32.2 - 35.5 g/dL    RDW 44.2 35.9 - 50.0 fL    Platelet Count 204 164 - 446 K/uL    MPV 11.1 9.0 - 12.9 fL    Neutrophils-Polys 54.70 44.00 - 72.00 %    Lymphocytes 32.90 22.00 - 41.00 %    Monocytes 6.80 0.00 - 13.40 %    Eosinophils 3.70 0.00 - 6.90 %    Basophils 1.70 0.00 - 1.80 %    Immature Granulocytes 0.20 0.00 - 0.90 %    Nucleated RBC 0.00 0.00 - 0.20 /100 WBC    Neutrophils (Absolute) 2.24 1.82 - 7.42 K/uL    Lymphs (Absolute) 1.35 1.00 - 4.80 K/uL    Monos  (Absolute) 0.28 0.00 - 0.85 K/uL    Eos (Absolute) 0.15 0.00 - 0.51 K/uL    Baso (Absolute) 0.07 0.00 - 0.12 K/uL    Immature Granulocytes (abs) 0.01 0.00 - 0.11 K/uL    NRBC (Absolute) 0.00 K/uL   Comp Metabolic Panel    Collection Time: 05/24/25 10:50 AM   Result Value Ref Range    Sodium 141 135 - 145 mmol/L    Potassium 4.2 3.6 - 5.5 mmol/L    Chloride 105 96 - 112 mmol/L    Co2 17 (L) 20 - 33 mmol/L    Anion Gap 19.0 (H) 7.0 - 16.0    Glucose 83 65 - 99 mg/dL    Bun 5 (L) 8 - 22 mg/dL    Creatinine 1.02 0.50 - 1.40 mg/dL    Calcium 9.6 8.5 - 10.5 mg/dL    Correct Calcium 9.0 8.5 - 10.5 mg/dL    AST(SGOT) 18 12 - 45 U/L    ALT(SGPT) 14 2 - 50 U/L    Alkaline Phosphatase 62 30 - 99 U/L    Total Bilirubin 3.7 (H) 0.1 - 1.5 mg/dL    Albumin 4.7 3.2 - 4.9 g/dL    Total Protein 7.6 6.0 - 8.2 g/dL    Globulin 2.9 1.9 - 3.5 g/dL    A-G Ratio 1.6 g/dL   CRP QUANTITIVE (NON-CARDIAC)    Collection Time: 05/24/25 10:50 AM   Result Value Ref Range    Stat C-Reactive Protein <0.30 0.00 - 0.75 mg/dL   Monoclonal Protein and FLC, Serum    Collection Time: 05/24/25 10:50 AM   Result Value Ref Range    Albumin 4.70 3.75 - 5.01 g/dL    Alpha-1 Globulin 0.25 0.19 - 0.46 g/dL    Alpha-2 Globulin 0.57 0.48 - 1.05 g/dL    Beta Globulin 0.75 0.48 - 1.10 g/dL    Gamma Globulin 1.12 0.62 - 1.51 g/dL    Immunofixation MARIE Done     Immunoglobulin G 896 768 - 1632 mg/dL    Immunoglobulin A 282 68 - 408 mg/dL    Immunoglobulin M 313 (H) 35 - 263 mg/dL    Monoclonal Protein Not Applicable <=0.00 g/dL    Total Protein, Serum 7.4 6.3 - 8.2 g/dL    Free Kappa Light Chains 23.14 (H) 3.30 - 19.40 mg/L    Free Lambda Light Chains 19.09 5.71 - 26.30 mg/L    Kappa-Lambda Ratio 1.21 0.26 - 1.65    Interpretation See Note     EER Monoclonal Protein and FLC, Serum See Note    HEAVY METALS BLOOD    Collection Time: 05/24/25 10:50 AM   Result Value Ref Range    Arsenic, Blood <10.0 <=12.0 ug/L    Mercury, Blood <2.5 <=10.0 ug/L    Lead Blood <2.0 <=4.9  ug/dL    Cadmium Blood <1.0 <=5.0 ug/L   DSDNA AB, IGG W/RFLX TO IFA TITER    Collection Time: 05/24/25 10:50 AM   Result Value Ref Range    Anti-Dna -Ds SEE BELOW    VITAMIN B1    Collection Time: 05/24/25 10:50 AM   Result Value Ref Range    Vitamin B1 119 70 - 180 nmol/L   VITAMIN B6    Collection Time: 05/24/25 10:50 AM   Result Value Ref Range    Vitamin B6 61.0 20.0 - 125.0 nmol/L   CCP ANTIBODIES, IGG/IGA    Collection Time: 05/24/25 10:50 AM   Result Value Ref Range    Cyclic Citrullinated Peptide Ab, IgG/A 5 0 - 19 Units   ESTIMATED GFR    Collection Time: 05/24/25 10:50 AM   Result Value Ref Range    GFR (CKD-EPI) 70 >60 mL/min/1.73 m 2   ZINC SERUM    Collection Time: 05/24/25 10:52 AM   Result Value Ref Range    Zinc Serum 71.7 60.0 - 120.0 ug/dL   LYME WESTERN BLOT IGG + IGM    Collection Time: 05/24/25 10:52 AM   Result Value Ref Range    B. burgdorferi IgG IB Negative Negative    B. burgdorferi IgM IB Negative Negative   COPPER, SERUM    Collection Time: 05/24/25 10:52 AM   Result Value Ref Range    Copper Serum 73.4 (L) 80.0 - 155.0 ug/dL   Aquaporin-4 Receptor IgG, Serum, rflx Titer    Collection Time: 05/24/25 10:52 AM   Result Value Ref Range    Aquaporin-4 Receptor IgG,Serum <1:10 <1:10             All pertinent features of laboratory and imaging reviewed including primary images where applicable      1. Palpitations  propranolol (INDERAL) 10 MG Tab    DISCONTINUED: propranolol (INDERAL) 20 MG Tab          Assessment / Plan / MDM:    It was my pleasure to meet with Ms. Arevalo.    Blood pressure is well controlled.  She will continue to monitor and eat hearty healthy diet.    Can trial propanolol    Tilt test for orthostatic dizziness palpitations especially breathlessness    I will see Ms. Arevalo back in 1 year time and encouraged her to follow up with us over the phone or electronically using my MyChart as issues arise.    It is my pleasure to participate in the care of Ms. Arevalo.  Please do  not hesitate to contact me with questions or concerns.    Chaim Sigala MD PhD [facility]  Cardiologist [facility]    Please note that this dictation was created using voice recognition software. There may be errors I did not discover before finalizing the note.              [1]   Current Outpatient Medications:     MAGNESIUM PO, Take  by mouth., Disp: , Rfl:     Pyridoxine HCl (VITAMIN B-6 PO), Take  by mouth., Disp: , Rfl:     IBUPROFEN & ACETAMINOPHEN PO, Take  by mouth. (Patient taking differently: Take  by mouth as needed.), Disp: , Rfl:     propranolol (INDERAL) 10 MG Tab, Take 1 Tablet by mouth 3 times a day., Disp: 90 Tablet, Rfl: 11    levothyroxine (SYNTHROID) 100 MCG Tab, TAKE ONE TABLET BY MOUTH EVERY MORNING ON AN EMPTY STOMACH , Disp: 90 tablet, Rfl: 1    Prenatal MV-Min-Fe Fum-FA-DHA (PRENATAL 1 PO), Take  by mouth., Disp: , Rfl:   [2]   Past Medical History:  Diagnosis Date    Arrhythmia     palpitations    Cold     Fibromyalgia     Heart burn     Hyperthyroidism 2010    status post thyroidectomy    Hypothyroidism, postsurgical 2010    Indigestion     Interstitial cystitis     Migraine     Renal disorder     intertstital cystitis   [3]   Past Surgical History:  Procedure Laterality Date    THYROIDECTOMY TOTAL  9/29/2010    Performed by JUSTIN CLAYTON at SURGERY SAME DAY [facility]    TONSILLECTOMY AND ADENOIDECTOMY  1985    MASTOIDECTOMY      MYRINGOTOMY      OTHER      placement of Anne Cath times 2    OTHER ORTHOPEDIC SURGERY      broken left arm   [4]   Social History  Tobacco Use    Smoking status: Never    Smokeless tobacco: Never   Substance Use Topics    Alcohol use: Yes     Alcohol/week: 0.0 oz     Comment: rare    Drug use: No   [5]   Allergies  Allergen Reactions    Macrobid [Nitrofurantoin Monohydrate Macrocrystals]      Chest problems    Minocycline Hives    Neurontin [Gabapentin]      Slurry speechj    Rocephin [Ceftriaxone Sodium] Hives     Tapazole [Thiamazole] Hives    Gluten Meal Diarrhea and Vomiting     All gluten products    Propylthiouracil          Ayah Dodd M.D.  [address] [facility] [address]  Via In Basket

## 2025-05-30 ENCOUNTER — APPOINTMENT (OUTPATIENT)
Dept: PHYSICAL THERAPY | Facility: MEDICAL CENTER | Age: 45
End: 2025-05-30
Attending: OTOLARYNGOLOGY
Payer: COMMERCIAL

## 2025-06-05 LAB
ENA JO1 AB TITR SER: 2 AU/ML (ref 0–40)
ENA SM IGG SER-ACNC: 2 AU/ML (ref 0–40)
ENA SS-A 60KD AB SER-ACNC: 0 AU/ML (ref 0–40)
ENA SS-A IGG SER QL: 3 AU/ML (ref 0–40)
ENA SS-B IGG SER IA-ACNC: 0 AU/ML (ref 0–40)

## 2025-06-06 ENCOUNTER — APPOINTMENT (OUTPATIENT)
Dept: PHYSICAL THERAPY | Facility: MEDICAL CENTER | Age: 45
End: 2025-06-06
Attending: OTOLARYNGOLOGY
Payer: COMMERCIAL

## 2025-06-13 ENCOUNTER — APPOINTMENT (OUTPATIENT)
Dept: PHYSICAL THERAPY | Facility: MEDICAL CENTER | Age: 45
End: 2025-06-13
Attending: OTOLARYNGOLOGY
Payer: COMMERCIAL

## 2025-06-19 ENCOUNTER — APPOINTMENT (OUTPATIENT)
Dept: PHYSICAL THERAPY | Facility: MEDICAL CENTER | Age: 45
End: 2025-06-19
Attending: OTOLARYNGOLOGY
Payer: COMMERCIAL

## 2025-06-20 ENCOUNTER — APPOINTMENT (OUTPATIENT)
Dept: PHYSICAL THERAPY | Facility: MEDICAL CENTER | Age: 45
End: 2025-06-20
Attending: OTOLARYNGOLOGY
Payer: COMMERCIAL

## 2025-06-27 ENCOUNTER — HOSPITAL ENCOUNTER (OUTPATIENT)
Facility: MEDICAL CENTER | Age: 45
End: 2025-06-27
Attending: FAMILY MEDICINE
Payer: COMMERCIAL

## 2025-06-27 DIAGNOSIS — Z12.39 SCREENING BREAST EXAMINATION: ICD-10-CM

## 2025-06-27 PROCEDURE — 77063 BREAST TOMOSYNTHESIS BI: CPT

## 2025-07-23 ENCOUNTER — APPOINTMENT (OUTPATIENT)
Dept: CARDIOLOGY | Facility: MEDICAL CENTER | Age: 45
End: 2025-07-23
Attending: INTERNAL MEDICINE
Payer: COMMERCIAL

## 2025-08-09 ENCOUNTER — HOSPITAL ENCOUNTER (OUTPATIENT)
Dept: RADIOLOGY | Facility: MEDICAL CENTER | Age: 45
End: 2025-08-09
Attending: FAMILY MEDICINE
Payer: COMMERCIAL

## 2025-08-09 DIAGNOSIS — H90.5 CENTRAL HEARING LOSS: ICD-10-CM

## 2025-08-09 DIAGNOSIS — R42 DIZZINESS AND GIDDINESS: ICD-10-CM

## 2025-08-09 DIAGNOSIS — H69.93 DISORDER OF BOTH EUSTACHIAN TUBES: ICD-10-CM

## 2025-08-09 PROCEDURE — A9579 GAD-BASE MR CONTRAST NOS,1ML: HCPCS | Mod: JZ | Performed by: FAMILY MEDICINE

## 2025-08-09 PROCEDURE — 70553 MRI BRAIN STEM W/O & W/DYE: CPT

## 2025-08-09 PROCEDURE — 700117 HCHG RX CONTRAST REV CODE 255: Mod: JZ | Performed by: FAMILY MEDICINE

## 2025-08-09 RX ORDER — GADOTERIDOL 279.3 MG/ML
13 INJECTION INTRAVENOUS ONCE
Status: COMPLETED | OUTPATIENT
Start: 2025-08-09 | End: 2025-08-09

## 2025-08-09 RX ADMIN — GADOTERIDOL 13 ML: 279.3 INJECTION, SOLUTION INTRAVENOUS at 10:34

## 2025-08-22 ENCOUNTER — HOSPITAL ENCOUNTER (OUTPATIENT)
Dept: LAB | Facility: MEDICAL CENTER | Age: 45
End: 2025-08-22
Attending: FAMILY MEDICINE
Payer: COMMERCIAL

## 2025-08-22 LAB
ALBUMIN SERPL BCP-MCNC: 4.4 G/DL (ref 3.2–4.9)
ALBUMIN/GLOB SERPL: 1.8 G/DL
ALP SERPL-CCNC: 64 U/L (ref 30–99)
ALT SERPL-CCNC: 15 U/L (ref 2–50)
ANION GAP SERPL CALC-SCNC: 10 MMOL/L (ref 7–16)
AST SERPL-CCNC: 17 U/L (ref 12–45)
BASOPHILS # BLD AUTO: 1.1 % (ref 0–1.8)
BASOPHILS # BLD: 0.06 K/UL (ref 0–0.12)
BILIRUB SERPL-MCNC: 2.3 MG/DL (ref 0.1–1.5)
BUN SERPL-MCNC: 7 MG/DL (ref 8–22)
CALCIUM ALBUM COR SERPL-MCNC: 9.2 MG/DL (ref 8.5–10.5)
CALCIUM SERPL-MCNC: 9.5 MG/DL (ref 8.5–10.5)
CHLORIDE SERPL-SCNC: 106 MMOL/L (ref 96–112)
CO2 SERPL-SCNC: 22 MMOL/L (ref 20–33)
CREAT SERPL-MCNC: 0.92 MG/DL (ref 0.5–1.4)
EOSINOPHIL # BLD AUTO: 0.1 K/UL (ref 0–0.51)
EOSINOPHIL NFR BLD: 1.9 % (ref 0–6.9)
ERYTHROCYTE [DISTWIDTH] IN BLOOD BY AUTOMATED COUNT: 40.1 FL (ref 35.9–50)
FASTING STATUS PATIENT QL REPORTED: NORMAL
GFR SERPLBLD CREATININE-BSD FMLA CKD-EPI: 79 ML/MIN/1.73 M 2
GLOBULIN SER CALC-MCNC: 2.5 G/DL (ref 1.9–3.5)
GLUCOSE SERPL-MCNC: 76 MG/DL (ref 65–99)
HCT VFR BLD AUTO: 45.9 % (ref 37–47)
HGB BLD-MCNC: 15.9 G/DL (ref 12–16)
IMM GRANULOCYTES # BLD AUTO: 0.02 K/UL (ref 0–0.11)
IMM GRANULOCYTES NFR BLD AUTO: 0.4 % (ref 0–0.9)
LYMPHOCYTES # BLD AUTO: 1.21 K/UL (ref 1–4.8)
LYMPHOCYTES NFR BLD: 22.4 % (ref 22–41)
MCH RBC QN AUTO: 32.3 PG (ref 27–33)
MCHC RBC AUTO-ENTMCNC: 34.6 G/DL (ref 32.2–35.5)
MCV RBC AUTO: 93.3 FL (ref 81.4–97.8)
MONOCYTES # BLD AUTO: 0.46 K/UL (ref 0–0.85)
MONOCYTES NFR BLD AUTO: 8.5 % (ref 0–13.4)
NEUTROPHILS # BLD AUTO: 3.54 K/UL (ref 1.82–7.42)
NEUTROPHILS NFR BLD: 65.7 % (ref 44–72)
NRBC # BLD AUTO: 0 K/UL
NRBC BLD-RTO: 0 /100 WBC (ref 0–0.2)
PLATELET # BLD AUTO: 214 K/UL (ref 164–446)
PMV BLD AUTO: 11.1 FL (ref 9–12.9)
POTASSIUM SERPL-SCNC: 4.6 MMOL/L (ref 3.6–5.5)
PROT SERPL-MCNC: 6.9 G/DL (ref 6–8.2)
RBC # BLD AUTO: 4.92 M/UL (ref 4.2–5.4)
SODIUM SERPL-SCNC: 138 MMOL/L (ref 135–145)
TSH SERPL-ACNC: 6.15 UIU/ML (ref 0.38–5.33)
WBC # BLD AUTO: 5.4 K/UL (ref 4.8–10.8)

## 2025-08-22 PROCEDURE — 85025 COMPLETE CBC W/AUTO DIFF WBC: CPT

## 2025-08-22 PROCEDURE — 84443 ASSAY THYROID STIM HORMONE: CPT

## 2025-08-22 PROCEDURE — 80053 COMPREHEN METABOLIC PANEL: CPT

## 2025-08-22 PROCEDURE — 36415 COLL VENOUS BLD VENIPUNCTURE: CPT
